# Patient Record
Sex: MALE | Race: WHITE | NOT HISPANIC OR LATINO | Employment: FULL TIME
[De-identification: names, ages, dates, MRNs, and addresses within clinical notes are randomized per-mention and may not be internally consistent; named-entity substitution may affect disease eponyms.]

---

## 2017-01-25 ENCOUNTER — RECORDS - HEALTHEAST (OUTPATIENT)
Dept: ADMINISTRATIVE | Facility: OTHER | Age: 62
End: 2017-01-25

## 2017-03-21 ENCOUNTER — COMMUNICATION - HEALTHEAST (OUTPATIENT)
Dept: INTERNAL MEDICINE | Facility: CLINIC | Age: 62
End: 2017-03-21

## 2017-03-21 DIAGNOSIS — M10.9 GOUT: ICD-10-CM

## 2017-04-12 ENCOUNTER — COMMUNICATION - HEALTHEAST (OUTPATIENT)
Dept: INTERNAL MEDICINE | Facility: CLINIC | Age: 62
End: 2017-04-12

## 2017-04-12 DIAGNOSIS — I10 UNSPECIFIED ESSENTIAL HYPERTENSION: ICD-10-CM

## 2017-07-05 ENCOUNTER — RECORDS - HEALTHEAST (OUTPATIENT)
Dept: ADMINISTRATIVE | Facility: OTHER | Age: 62
End: 2017-07-05

## 2017-07-09 ENCOUNTER — COMMUNICATION - HEALTHEAST (OUTPATIENT)
Dept: INTERNAL MEDICINE | Facility: CLINIC | Age: 62
End: 2017-07-09

## 2017-07-09 DIAGNOSIS — M10.9 GOUT: ICD-10-CM

## 2017-07-13 ENCOUNTER — RECORDS - HEALTHEAST (OUTPATIENT)
Dept: ADMINISTRATIVE | Facility: OTHER | Age: 62
End: 2017-07-13

## 2017-08-29 ENCOUNTER — RECORDS - HEALTHEAST (OUTPATIENT)
Dept: ADMINISTRATIVE | Facility: OTHER | Age: 62
End: 2017-08-29

## 2017-09-26 ENCOUNTER — COMMUNICATION - HEALTHEAST (OUTPATIENT)
Dept: INTERNAL MEDICINE | Facility: CLINIC | Age: 62
End: 2017-09-26

## 2017-09-26 ENCOUNTER — RECORDS - HEALTHEAST (OUTPATIENT)
Dept: ADMINISTRATIVE | Facility: OTHER | Age: 62
End: 2017-09-26

## 2017-09-26 ENCOUNTER — HOSPITAL ENCOUNTER (OUTPATIENT)
Dept: CT IMAGING | Facility: CLINIC | Age: 62
Discharge: HOME | End: 2017-09-26
Attending: INTERNAL MEDICINE | Admitting: INTERNAL MEDICINE
Payer: COMMERCIAL

## 2017-09-26 DIAGNOSIS — Z13.6 SCREENING FOR HEART DISEASE: ICD-10-CM

## 2017-09-26 LAB — RADIOLOGIST FLAGS: NORMAL

## 2017-09-26 PROCEDURE — 75571 CT HRT W/O DYE W/CA TEST: CPT | Mod: GA

## 2017-09-26 PROCEDURE — 75571 CT HRT W/O DYE W/CA TEST: CPT | Performed by: INTERNAL MEDICINE

## 2017-10-10 ENCOUNTER — COMMUNICATION - HEALTHEAST (OUTPATIENT)
Dept: INTERNAL MEDICINE | Facility: CLINIC | Age: 62
End: 2017-10-10

## 2017-10-10 DIAGNOSIS — M10.9 GOUT: ICD-10-CM

## 2017-10-30 ENCOUNTER — AMBULATORY - HEALTHEAST (OUTPATIENT)
Dept: INTERNAL MEDICINE | Facility: CLINIC | Age: 62
End: 2017-10-30

## 2017-10-30 ENCOUNTER — COMMUNICATION - HEALTHEAST (OUTPATIENT)
Dept: LAB | Facility: CLINIC | Age: 62
End: 2017-10-30

## 2017-10-30 DIAGNOSIS — Z85.820 HISTORY OF MELANOMA: ICD-10-CM

## 2017-10-30 DIAGNOSIS — E78.2 MIXED HYPERLIPIDEMIA: ICD-10-CM

## 2017-10-30 DIAGNOSIS — R35.0 URINARY FREQUENCY: ICD-10-CM

## 2017-10-30 DIAGNOSIS — E79.0 HYPERURICEMIA: ICD-10-CM

## 2017-10-30 DIAGNOSIS — Z12.5 PROSTATE CANCER SCREENING: ICD-10-CM

## 2017-11-03 ENCOUNTER — AMBULATORY - HEALTHEAST (OUTPATIENT)
Dept: LAB | Facility: CLINIC | Age: 62
End: 2017-11-03

## 2017-11-03 DIAGNOSIS — Z85.820 HISTORY OF MELANOMA: ICD-10-CM

## 2017-11-03 DIAGNOSIS — Z12.5 PROSTATE CANCER SCREENING: ICD-10-CM

## 2017-11-03 DIAGNOSIS — E78.2 MIXED HYPERLIPIDEMIA: ICD-10-CM

## 2017-11-03 DIAGNOSIS — R35.0 URINARY FREQUENCY: ICD-10-CM

## 2017-11-03 DIAGNOSIS — E79.0 HYPERURICEMIA: ICD-10-CM

## 2017-11-03 LAB
CHOLEST SERPL-MCNC: 208 MG/DL
FASTING STATUS PATIENT QL REPORTED: YES
HDLC SERPL-MCNC: 39 MG/DL
LDLC SERPL CALC-MCNC: 116 MG/DL
PSA SERPL-MCNC: 3.9 NG/ML (ref 0–4.5)
TRIGL SERPL-MCNC: 264 MG/DL

## 2017-11-10 ENCOUNTER — OFFICE VISIT - HEALTHEAST (OUTPATIENT)
Dept: INTERNAL MEDICINE | Facility: CLINIC | Age: 62
End: 2017-11-10

## 2017-11-10 DIAGNOSIS — N52.9 ERECTILE DYSFUNCTION: ICD-10-CM

## 2017-11-10 DIAGNOSIS — E78.2 HYPERLIPEMIA, MIXED: ICD-10-CM

## 2017-11-10 DIAGNOSIS — I25.10 CAD IN NATIVE ARTERY: ICD-10-CM

## 2017-11-10 DIAGNOSIS — E66.01 SEVERE OBESITY WITH BODY MASS INDEX (BMI) OF 35.0 TO 35.9 AND COMORBIDITY (H): ICD-10-CM

## 2017-11-10 DIAGNOSIS — I10 ESSENTIAL HYPERTENSION: ICD-10-CM

## 2017-11-10 ASSESSMENT — MIFFLIN-ST. JEOR: SCORE: 1917.09

## 2017-11-13 ENCOUNTER — COMMUNICATION - HEALTHEAST (OUTPATIENT)
Dept: INTERNAL MEDICINE | Facility: CLINIC | Age: 62
End: 2017-11-13

## 2017-11-15 ENCOUNTER — RECORDS - HEALTHEAST (OUTPATIENT)
Dept: ADMINISTRATIVE | Facility: OTHER | Age: 62
End: 2017-11-15

## 2017-11-16 ENCOUNTER — COMMUNICATION - HEALTHEAST (OUTPATIENT)
Dept: INTERNAL MEDICINE | Facility: CLINIC | Age: 62
End: 2017-11-16

## 2017-11-16 ENCOUNTER — AMBULATORY - HEALTHEAST (OUTPATIENT)
Dept: INTERNAL MEDICINE | Facility: CLINIC | Age: 62
End: 2017-11-16

## 2017-11-16 ENCOUNTER — HOSPITAL ENCOUNTER (OUTPATIENT)
Dept: NUCLEAR MEDICINE | Facility: HOSPITAL | Age: 62
Discharge: HOME | End: 2017-11-16
Attending: INTERNAL MEDICINE

## 2017-11-16 ENCOUNTER — HOSPITAL ENCOUNTER (OUTPATIENT)
Dept: CARDIOLOGY | Facility: HOSPITAL | Age: 62
Discharge: HOME | End: 2017-11-16
Attending: INTERNAL MEDICINE

## 2017-11-16 DIAGNOSIS — I25.10 CAD IN NATIVE ARTERY: ICD-10-CM

## 2017-11-16 DIAGNOSIS — I50.21 ACUTE SYSTOLIC HEART FAILURE (H): ICD-10-CM

## 2017-11-16 LAB
CV STRESS CURRENT BP HE: NORMAL
CV STRESS CURRENT HR HE: 101
CV STRESS CURRENT HR HE: 101
CV STRESS CURRENT HR HE: 102
CV STRESS CURRENT HR HE: 103
CV STRESS CURRENT HR HE: 103
CV STRESS CURRENT HR HE: 104
CV STRESS CURRENT HR HE: 111
CV STRESS CURRENT HR HE: 111
CV STRESS CURRENT HR HE: 112
CV STRESS CURRENT HR HE: 113
CV STRESS CURRENT HR HE: 117
CV STRESS CURRENT HR HE: 91
CV STRESS CURRENT HR HE: 94
CV STRESS CURRENT HR HE: 95
CV STRESS CURRENT HR HE: 96
CV STRESS DEVIATION TIME HE: NORMAL
CV STRESS ECHO PERCENT HR HE: NORMAL
CV STRESS EXERCISE STAGE HE: NORMAL
CV STRESS FINAL RESTING BP HE: NORMAL
CV STRESS FINAL RESTING HR HE: 94
CV STRESS MAX HR HE: 117
CV STRESS MAX TREADMILL GRADE HE: 0
CV STRESS MAX TREADMILL SPEED HE: 0
CV STRESS PEAK DIA BP HE: NORMAL
CV STRESS PEAK SYS BP HE: NORMAL
CV STRESS PHASE HE: NORMAL
CV STRESS PROTOCOL HE: NORMAL
CV STRESS RESTING PT POSITION HE: NORMAL
CV STRESS RESTING PT POSITION HE: NORMAL
CV STRESS ST DEVIATION AMOUNT HE: NORMAL
CV STRESS ST DEVIATION ELEVATION HE: NORMAL
CV STRESS ST EVELATION AMOUNT HE: NORMAL
CV STRESS TEST TYPE HE: NORMAL
CV STRESS TOTAL STAGE TIME MIN 1 HE: NORMAL
NUC STRESS EJECTION FRACTION: 18 %
STRESS ECHO BASELINE BP: NORMAL
STRESS ECHO BASELINE HR: 89
STRESS ECHO CALCULATED PERCENT HR: 74 %
STRESS ECHO LAST STRESS BP: NORMAL
STRESS ECHO LAST STRESS HR: 111

## 2017-11-16 ASSESSMENT — MIFFLIN-ST. JEOR: SCORE: 1914.37

## 2017-11-21 ENCOUNTER — RECORDS - HEALTHEAST (OUTPATIENT)
Dept: ADMINISTRATIVE | Facility: OTHER | Age: 62
End: 2017-11-21

## 2017-11-22 ENCOUNTER — RECORDS - HEALTHEAST (OUTPATIENT)
Dept: ADMINISTRATIVE | Facility: OTHER | Age: 62
End: 2017-11-22

## 2017-12-11 ENCOUNTER — RECORDS - HEALTHEAST (OUTPATIENT)
Dept: ADMINISTRATIVE | Facility: OTHER | Age: 62
End: 2017-12-11

## 2017-12-20 ENCOUNTER — COMMUNICATION - HEALTHEAST (OUTPATIENT)
Dept: INTERNAL MEDICINE | Facility: CLINIC | Age: 62
End: 2017-12-20

## 2018-01-04 ENCOUNTER — AMBULATORY - HEALTHEAST (OUTPATIENT)
Dept: LAB | Facility: CLINIC | Age: 63
End: 2018-01-04

## 2018-01-04 ENCOUNTER — COMMUNICATION - HEALTHEAST (OUTPATIENT)
Dept: INTERNAL MEDICINE | Facility: CLINIC | Age: 63
End: 2018-01-04

## 2018-01-04 DIAGNOSIS — Z79.899 MEDICATION MANAGEMENT: ICD-10-CM

## 2018-01-04 LAB
ANION GAP SERPL CALCULATED.3IONS-SCNC: 6 MMOL/L (ref 5–18)
BUN SERPL-MCNC: 14 MG/DL (ref 8–22)
CALCIUM SERPL-MCNC: 9.4 MG/DL (ref 8.5–10.5)
CHLORIDE BLD-SCNC: 105 MMOL/L (ref 98–107)
CO2 SERPL-SCNC: 28 MMOL/L (ref 22–31)
CREAT SERPL-MCNC: 0.84 MG/DL (ref 0.7–1.3)
GFR SERPL CREATININE-BSD FRML MDRD: >60 ML/MIN/1.73M2
GLUCOSE BLD-MCNC: 110 MG/DL (ref 70–125)
POTASSIUM BLD-SCNC: 5.5 MMOL/L (ref 3.5–5)
SODIUM SERPL-SCNC: 139 MMOL/L (ref 136–145)

## 2018-01-05 ENCOUNTER — COMMUNICATION - HEALTHEAST (OUTPATIENT)
Dept: INTERNAL MEDICINE | Facility: CLINIC | Age: 63
End: 2018-01-05

## 2018-01-05 RX ORDER — LISINOPRIL 20 MG/1
20 TABLET ORAL 2 TIMES DAILY
Status: SHIPPED | COMMUNITY
Start: 2018-01-05 | End: 2022-09-12

## 2018-01-05 RX ORDER — METOPROLOL TARTRATE 25 MG/1
25 TABLET, FILM COATED ORAL 2 TIMES DAILY
Status: SHIPPED | COMMUNITY
Start: 2018-01-05 | End: 2022-09-12

## 2018-01-09 ENCOUNTER — RECORDS - HEALTHEAST (OUTPATIENT)
Dept: ADMINISTRATIVE | Facility: OTHER | Age: 63
End: 2018-01-09

## 2018-01-21 ENCOUNTER — COMMUNICATION - HEALTHEAST (OUTPATIENT)
Dept: INTERNAL MEDICINE | Facility: CLINIC | Age: 63
End: 2018-01-21

## 2018-01-21 DIAGNOSIS — M10.9 GOUT: ICD-10-CM

## 2018-02-05 ENCOUNTER — AMBULATORY - HEALTHEAST (OUTPATIENT)
Dept: LAB | Facility: CLINIC | Age: 63
End: 2018-02-05

## 2018-02-05 DIAGNOSIS — M79.10 MYALGIA: ICD-10-CM

## 2018-02-05 DIAGNOSIS — I42.9 CARDIOMYOPATHY, UNSPECIFIED TYPE (H): ICD-10-CM

## 2018-02-05 DIAGNOSIS — I44.7 LBBB (LEFT BUNDLE BRANCH BLOCK): ICD-10-CM

## 2018-02-05 DIAGNOSIS — Z79.899 MEDICATION MANAGEMENT: ICD-10-CM

## 2018-02-05 LAB
25(OH)D3 SERPL-MCNC: 24.5 NG/ML (ref 30–80)
ANION GAP SERPL CALCULATED.3IONS-SCNC: 10 MMOL/L (ref 5–18)
BUN SERPL-MCNC: 16 MG/DL (ref 8–22)
CALCIUM SERPL-MCNC: 9.5 MG/DL (ref 8.5–10.5)
CHLORIDE BLD-SCNC: 104 MMOL/L (ref 98–107)
CO2 SERPL-SCNC: 25 MMOL/L (ref 22–31)
CREAT SERPL-MCNC: 1 MG/DL (ref 0.7–1.3)
GFR SERPL CREATININE-BSD FRML MDRD: >60 ML/MIN/1.73M2
GLUCOSE BLD-MCNC: 114 MG/DL (ref 70–125)
POTASSIUM BLD-SCNC: 5.5 MMOL/L (ref 3.5–5)
SODIUM SERPL-SCNC: 139 MMOL/L (ref 136–145)

## 2018-02-12 ENCOUNTER — COMMUNICATION - HEALTHEAST (OUTPATIENT)
Dept: INTERNAL MEDICINE | Facility: CLINIC | Age: 63
End: 2018-02-12

## 2018-02-14 ENCOUNTER — RECORDS - HEALTHEAST (OUTPATIENT)
Dept: ADMINISTRATIVE | Facility: OTHER | Age: 63
End: 2018-02-14

## 2018-04-10 ENCOUNTER — RECORDS - HEALTHEAST (OUTPATIENT)
Dept: ADMINISTRATIVE | Facility: OTHER | Age: 63
End: 2018-04-10

## 2018-04-25 ENCOUNTER — COMMUNICATION - HEALTHEAST (OUTPATIENT)
Dept: INTERNAL MEDICINE | Facility: CLINIC | Age: 63
End: 2018-04-25

## 2018-04-25 DIAGNOSIS — M10.9 GOUT: ICD-10-CM

## 2018-05-22 ENCOUNTER — RECORDS - HEALTHEAST (OUTPATIENT)
Dept: ADMINISTRATIVE | Facility: OTHER | Age: 63
End: 2018-05-22

## 2018-06-08 ENCOUNTER — RECORDS - HEALTHEAST (OUTPATIENT)
Dept: ADMINISTRATIVE | Facility: OTHER | Age: 63
End: 2018-06-08

## 2018-06-11 ENCOUNTER — COMMUNICATION - HEALTHEAST (OUTPATIENT)
Dept: INTERNAL MEDICINE | Facility: CLINIC | Age: 63
End: 2018-06-11

## 2018-06-26 ENCOUNTER — RECORDS - HEALTHEAST (OUTPATIENT)
Dept: ADMINISTRATIVE | Facility: OTHER | Age: 63
End: 2018-06-26
Payer: COMMERCIAL

## 2018-06-27 ENCOUNTER — RECORDS - HEALTHEAST (OUTPATIENT)
Dept: ADMINISTRATIVE | Facility: OTHER | Age: 63
End: 2018-06-27

## 2018-07-03 ENCOUNTER — RECORDS - HEALTHEAST (OUTPATIENT)
Dept: ADMINISTRATIVE | Facility: OTHER | Age: 63
End: 2018-07-03

## 2018-07-10 ENCOUNTER — RECORDS - HEALTHEAST (OUTPATIENT)
Dept: ADMINISTRATIVE | Facility: OTHER | Age: 63
End: 2018-07-10

## 2018-08-09 ENCOUNTER — RECORDS - HEALTHEAST (OUTPATIENT)
Dept: ADMINISTRATIVE | Facility: OTHER | Age: 63
End: 2018-08-09

## 2018-11-05 ENCOUNTER — COMMUNICATION - HEALTHEAST (OUTPATIENT)
Dept: LAB | Facility: CLINIC | Age: 63
End: 2018-11-05

## 2018-11-05 ENCOUNTER — AMBULATORY - HEALTHEAST (OUTPATIENT)
Dept: INTERNAL MEDICINE | Facility: CLINIC | Age: 63
End: 2018-11-05

## 2018-11-05 DIAGNOSIS — Z12.5 SCREENING FOR PROSTATE CANCER: ICD-10-CM

## 2018-11-05 DIAGNOSIS — E78.2 MIXED HYPERLIPIDEMIA: ICD-10-CM

## 2018-11-05 DIAGNOSIS — E79.0 HYPERURICEMIA: ICD-10-CM

## 2018-11-05 DIAGNOSIS — Z51.81 MEDICATION MONITORING ENCOUNTER: ICD-10-CM

## 2018-11-05 DIAGNOSIS — R35.0 URINARY FREQUENCY: ICD-10-CM

## 2018-11-08 ENCOUNTER — COMMUNICATION - HEALTHEAST (OUTPATIENT)
Dept: INTERNAL MEDICINE | Facility: CLINIC | Age: 63
End: 2018-11-08

## 2018-11-08 DIAGNOSIS — M10.9 GOUT: ICD-10-CM

## 2018-11-26 ENCOUNTER — AMBULATORY - HEALTHEAST (OUTPATIENT)
Dept: LAB | Facility: CLINIC | Age: 63
End: 2018-11-26

## 2018-11-26 DIAGNOSIS — E79.0 HYPERURICEMIA: ICD-10-CM

## 2018-11-26 DIAGNOSIS — Z12.5 SCREENING FOR PROSTATE CANCER: ICD-10-CM

## 2018-11-26 DIAGNOSIS — R35.0 URINARY FREQUENCY: ICD-10-CM

## 2018-11-26 DIAGNOSIS — Z51.81 MEDICATION MONITORING ENCOUNTER: ICD-10-CM

## 2018-11-26 DIAGNOSIS — E78.2 MIXED HYPERLIPIDEMIA: ICD-10-CM

## 2018-11-26 LAB
ALBUMIN SERPL-MCNC: 4 G/DL (ref 3.5–5)
ALBUMIN UR-MCNC: NEGATIVE MG/DL
ALP SERPL-CCNC: 53 U/L (ref 45–120)
ALT SERPL W P-5'-P-CCNC: 25 U/L (ref 0–45)
ANION GAP SERPL CALCULATED.3IONS-SCNC: 11 MMOL/L (ref 5–18)
APPEARANCE UR: CLEAR
AST SERPL W P-5'-P-CCNC: 23 U/L (ref 0–40)
BILIRUB SERPL-MCNC: 0.8 MG/DL (ref 0–1)
BILIRUB UR QL STRIP: NEGATIVE
BUN SERPL-MCNC: 13 MG/DL (ref 8–22)
CALCIUM SERPL-MCNC: 9.9 MG/DL (ref 8.5–10.5)
CHLORIDE BLD-SCNC: 103 MMOL/L (ref 98–107)
CHOLEST SERPL-MCNC: 185 MG/DL
CO2 SERPL-SCNC: 26 MMOL/L (ref 22–31)
COLOR UR AUTO: YELLOW
CREAT SERPL-MCNC: 0.94 MG/DL (ref 0.7–1.3)
ERYTHROCYTE [DISTWIDTH] IN BLOOD BY AUTOMATED COUNT: 18.1 % (ref 11–14.5)
FASTING STATUS PATIENT QL REPORTED: YES
GFR SERPL CREATININE-BSD FRML MDRD: >60 ML/MIN/1.73M2
GLUCOSE BLD-MCNC: 110 MG/DL (ref 70–125)
GLUCOSE UR STRIP-MCNC: NEGATIVE MG/DL
HCT VFR BLD AUTO: 40.9 % (ref 40–54)
HDLC SERPL-MCNC: 40 MG/DL
HGB BLD-MCNC: 12.2 G/DL (ref 14–18)
HGB UR QL STRIP: NEGATIVE
KETONES UR STRIP-MCNC: NEGATIVE MG/DL
LDLC SERPL CALC-MCNC: 98 MG/DL
LEUKOCYTE ESTERASE UR QL STRIP: NEGATIVE
MCH RBC QN AUTO: 19.4 PG (ref 27–34)
MCHC RBC AUTO-ENTMCNC: 29.8 G/DL (ref 32–36)
MCV RBC AUTO: 65 FL (ref 80–100)
NITRATE UR QL: NEGATIVE
PH UR STRIP: 5.5 [PH] (ref 5–8)
PLATELET # BLD AUTO: 161 THOU/UL (ref 140–440)
POTASSIUM BLD-SCNC: 5.2 MMOL/L (ref 3.5–5)
PROT SERPL-MCNC: 6.6 G/DL (ref 6–8)
PSA SERPL-MCNC: 3.2 NG/ML (ref 0–4.5)
RBC # BLD AUTO: 6.29 MILL/UL (ref 4.4–6.2)
SODIUM SERPL-SCNC: 140 MMOL/L (ref 136–145)
SP GR UR STRIP: 1.02 (ref 1–1.03)
TRIGL SERPL-MCNC: 237 MG/DL
URATE SERPL-MCNC: 6.9 MG/DL (ref 3–8)
UROBILINOGEN UR STRIP-ACNC: NORMAL
WBC: 6.5 THOU/UL (ref 4–11)

## 2018-11-28 ENCOUNTER — COMMUNICATION - HEALTHEAST (OUTPATIENT)
Dept: INTERNAL MEDICINE | Facility: CLINIC | Age: 63
End: 2018-11-28

## 2018-12-11 ENCOUNTER — OFFICE VISIT - HEALTHEAST (OUTPATIENT)
Dept: INTERNAL MEDICINE | Facility: CLINIC | Age: 63
End: 2018-12-11

## 2018-12-11 DIAGNOSIS — E78.2 MIXED HYPERLIPIDEMIA: ICD-10-CM

## 2018-12-11 DIAGNOSIS — Z00.00 ROUTINE GENERAL MEDICAL EXAMINATION AT A HEALTH CARE FACILITY: ICD-10-CM

## 2018-12-11 DIAGNOSIS — C43.72 MALIGNANT MELANOMA OF LEFT LOWER EXTREMITY INCLUDING HIP (H): ICD-10-CM

## 2018-12-11 DIAGNOSIS — M10.9 GOUTY ARTHROPATHY: ICD-10-CM

## 2018-12-11 DIAGNOSIS — I10 ESSENTIAL HYPERTENSION: ICD-10-CM

## 2018-12-11 DIAGNOSIS — D56.8 OTHER THALASSEMIA (H): ICD-10-CM

## 2018-12-11 DIAGNOSIS — Z86.0100 HISTORY OF COLONIC POLYPS: ICD-10-CM

## 2018-12-11 DIAGNOSIS — E66.01 SEVERE OBESITY WITH BODY MASS INDEX (BMI) OF 35.0 TO 35.9 AND COMORBIDITY (H): ICD-10-CM

## 2018-12-11 DIAGNOSIS — N52.9 ERECTILE DYSFUNCTION, UNSPECIFIED ERECTILE DYSFUNCTION TYPE: ICD-10-CM

## 2018-12-11 ASSESSMENT — MIFFLIN-ST. JEOR: SCORE: 1873.55

## 2018-12-27 ENCOUNTER — RECORDS - HEALTHEAST (OUTPATIENT)
Dept: ADMINISTRATIVE | Facility: OTHER | Age: 63
End: 2018-12-27

## 2019-01-02 ENCOUNTER — RECORDS - HEALTHEAST (OUTPATIENT)
Dept: ADMINISTRATIVE | Facility: OTHER | Age: 64
End: 2019-01-02

## 2019-01-09 ENCOUNTER — RECORDS - HEALTHEAST (OUTPATIENT)
Dept: ADMINISTRATIVE | Facility: OTHER | Age: 64
End: 2019-01-09

## 2019-02-04 ENCOUNTER — COMMUNICATION - HEALTHEAST (OUTPATIENT)
Dept: INTERNAL MEDICINE | Facility: CLINIC | Age: 64
End: 2019-02-04

## 2019-02-04 DIAGNOSIS — M10.9 GOUT: ICD-10-CM

## 2019-02-26 ENCOUNTER — SURGERY - HEALTHEAST (OUTPATIENT)
Dept: GASTROENTEROLOGY | Facility: HOSPITAL | Age: 64
End: 2019-02-26

## 2019-02-26 ASSESSMENT — MIFFLIN-ST. JEOR: SCORE: 1907.56

## 2019-05-02 ENCOUNTER — RECORDS - HEALTHEAST (OUTPATIENT)
Dept: ADMINISTRATIVE | Facility: OTHER | Age: 64
End: 2019-05-02

## 2019-05-06 ENCOUNTER — RECORDS - HEALTHEAST (OUTPATIENT)
Dept: ADMINISTRATIVE | Facility: OTHER | Age: 64
End: 2019-05-06

## 2019-05-16 ENCOUNTER — HOSPITAL ENCOUNTER (OUTPATIENT)
Dept: CT IMAGING | Facility: HOSPITAL | Age: 64
Discharge: HOME | End: 2019-05-16
Attending: ORTHOPAEDIC SURGERY

## 2019-05-16 DIAGNOSIS — Z00.00 HEALTHCARE MAINTENANCE: ICD-10-CM

## 2019-07-09 ENCOUNTER — RECORDS - HEALTHEAST (OUTPATIENT)
Dept: ADMINISTRATIVE | Facility: OTHER | Age: 64
End: 2019-07-09

## 2019-08-01 ENCOUNTER — RECORDS - HEALTHEAST (OUTPATIENT)
Dept: ADMINISTRATIVE | Facility: OTHER | Age: 64
End: 2019-08-01

## 2019-08-20 ENCOUNTER — RECORDS - HEALTHEAST (OUTPATIENT)
Dept: ADMINISTRATIVE | Facility: OTHER | Age: 64
End: 2019-08-20

## 2020-01-20 ENCOUNTER — COMMUNICATION - HEALTHEAST (OUTPATIENT)
Dept: LAB | Facility: CLINIC | Age: 65
End: 2020-01-20

## 2020-01-21 ENCOUNTER — AMBULATORY - HEALTHEAST (OUTPATIENT)
Dept: INTERNAL MEDICINE | Facility: CLINIC | Age: 65
End: 2020-01-21

## 2020-01-21 DIAGNOSIS — Z12.5 PROSTATE CANCER SCREENING: ICD-10-CM

## 2020-01-21 DIAGNOSIS — Z51.81 ENCOUNTER FOR THERAPEUTIC DRUG MONITORING: ICD-10-CM

## 2020-01-21 DIAGNOSIS — Z00.00 ROUTINE GENERAL MEDICAL EXAMINATION AT A HEALTH CARE FACILITY: ICD-10-CM

## 2020-01-21 DIAGNOSIS — E79.0 HYPERURICEMIA: ICD-10-CM

## 2020-01-21 DIAGNOSIS — D56.0 ALPHA-THALASSEMIA (H): ICD-10-CM

## 2020-01-21 DIAGNOSIS — E78.2 MIXED HYPERLIPIDEMIA: ICD-10-CM

## 2020-02-11 ENCOUNTER — RECORDS - HEALTHEAST (OUTPATIENT)
Dept: ADMINISTRATIVE | Facility: OTHER | Age: 65
End: 2020-02-11

## 2020-02-12 ENCOUNTER — COMMUNICATION - HEALTHEAST (OUTPATIENT)
Dept: INTERNAL MEDICINE | Facility: CLINIC | Age: 65
End: 2020-02-12

## 2020-02-12 DIAGNOSIS — M10.9 GOUT: ICD-10-CM

## 2020-02-14 ENCOUNTER — AMBULATORY - HEALTHEAST (OUTPATIENT)
Dept: LAB | Facility: CLINIC | Age: 65
End: 2020-02-14

## 2020-02-14 DIAGNOSIS — E78.2 MIXED HYPERLIPIDEMIA: ICD-10-CM

## 2020-02-14 DIAGNOSIS — E79.0 HYPERURICEMIA: ICD-10-CM

## 2020-02-14 DIAGNOSIS — Z00.00 ROUTINE GENERAL MEDICAL EXAMINATION AT A HEALTH CARE FACILITY: ICD-10-CM

## 2020-02-14 DIAGNOSIS — D56.0 ALPHA-THALASSEMIA (H): ICD-10-CM

## 2020-02-14 DIAGNOSIS — Z51.81 ENCOUNTER FOR THERAPEUTIC DRUG MONITORING: ICD-10-CM

## 2020-02-14 DIAGNOSIS — Z12.5 PROSTATE CANCER SCREENING: ICD-10-CM

## 2020-02-14 LAB
ALBUMIN SERPL-MCNC: 4 G/DL (ref 3.5–5)
ALBUMIN UR-MCNC: NEGATIVE MG/DL
ALP SERPL-CCNC: 61 U/L (ref 45–120)
ALT SERPL W P-5'-P-CCNC: 32 U/L (ref 0–45)
ANION GAP SERPL CALCULATED.3IONS-SCNC: 7 MMOL/L (ref 5–18)
APPEARANCE UR: CLEAR
AST SERPL W P-5'-P-CCNC: 24 U/L (ref 0–40)
BILIRUB SERPL-MCNC: 0.7 MG/DL (ref 0–1)
BILIRUB UR QL STRIP: NEGATIVE
BUN SERPL-MCNC: 15 MG/DL (ref 8–22)
CALCIUM SERPL-MCNC: 9.6 MG/DL (ref 8.5–10.5)
CHLORIDE BLD-SCNC: 103 MMOL/L (ref 98–107)
CHOLEST SERPL-MCNC: 208 MG/DL
CO2 SERPL-SCNC: 28 MMOL/L (ref 22–31)
COLOR UR AUTO: YELLOW
CREAT SERPL-MCNC: 0.91 MG/DL (ref 0.7–1.3)
ERYTHROCYTE [DISTWIDTH] IN BLOOD BY AUTOMATED COUNT: 18.4 % (ref 11–14.5)
FASTING STATUS PATIENT QL REPORTED: YES
GFR SERPL CREATININE-BSD FRML MDRD: >60 ML/MIN/1.73M2
GLUCOSE BLD-MCNC: 104 MG/DL (ref 70–125)
GLUCOSE UR STRIP-MCNC: NEGATIVE MG/DL
HCT VFR BLD AUTO: 41.1 % (ref 40–54)
HDLC SERPL-MCNC: 34 MG/DL
HGB BLD-MCNC: 12.3 G/DL (ref 14–18)
HGB UR QL STRIP: NEGATIVE
KETONES UR STRIP-MCNC: NEGATIVE MG/DL
LDLC SERPL CALC-MCNC: 101 MG/DL
LEUKOCYTE ESTERASE UR QL STRIP: NEGATIVE
MCH RBC QN AUTO: 19.5 PG (ref 27–34)
MCHC RBC AUTO-ENTMCNC: 29.9 G/DL (ref 32–36)
MCV RBC AUTO: 65 FL (ref 80–100)
NITRATE UR QL: NEGATIVE
PH UR STRIP: 5.5 [PH] (ref 5–8)
PLATELET # BLD AUTO: 183 THOU/UL (ref 140–440)
POTASSIUM BLD-SCNC: 5.2 MMOL/L (ref 3.5–5)
PROT SERPL-MCNC: 6.5 G/DL (ref 6–8)
PSA SERPL-MCNC: 3.8 NG/ML (ref 0–4.5)
RBC # BLD AUTO: 6.3 MILL/UL (ref 4.4–6.2)
SODIUM SERPL-SCNC: 138 MMOL/L (ref 136–145)
SP GR UR STRIP: 1.02 (ref 1–1.03)
TRIGL SERPL-MCNC: 364 MG/DL
URATE SERPL-MCNC: 6.7 MG/DL (ref 3–8)
UROBILINOGEN UR STRIP-ACNC: NORMAL
WBC: 10.2 THOU/UL (ref 4–11)

## 2020-02-17 ENCOUNTER — COMMUNICATION - HEALTHEAST (OUTPATIENT)
Dept: INTERNAL MEDICINE | Facility: CLINIC | Age: 65
End: 2020-02-17

## 2020-02-21 ENCOUNTER — OFFICE VISIT - HEALTHEAST (OUTPATIENT)
Dept: INTERNAL MEDICINE | Facility: CLINIC | Age: 65
End: 2020-02-21

## 2020-02-21 DIAGNOSIS — I10 ESSENTIAL HYPERTENSION: ICD-10-CM

## 2020-02-21 DIAGNOSIS — D56.8 OTHER THALASSEMIA (H): ICD-10-CM

## 2020-02-21 DIAGNOSIS — Z11.59 NEED FOR HEPATITIS C SCREENING TEST: ICD-10-CM

## 2020-02-21 DIAGNOSIS — C43.72 MALIGNANT MELANOMA OF LEFT LOWER LEG (H): ICD-10-CM

## 2020-02-21 DIAGNOSIS — E78.2 MIXED HYPERLIPIDEMIA: ICD-10-CM

## 2020-02-21 DIAGNOSIS — Z11.4 SCREENING FOR HIV (HUMAN IMMUNODEFICIENCY VIRUS): ICD-10-CM

## 2020-02-21 DIAGNOSIS — N52.9 ERECTILE DYSFUNCTION, UNSPECIFIED ERECTILE DYSFUNCTION TYPE: ICD-10-CM

## 2020-02-21 DIAGNOSIS — Z00.00 HEALTHCARE MAINTENANCE: ICD-10-CM

## 2020-02-21 DIAGNOSIS — M48.062 SPINAL STENOSIS OF LUMBAR REGION WITH NEUROGENIC CLAUDICATION: ICD-10-CM

## 2020-02-21 DIAGNOSIS — D12.6 BENIGN NEOPLASM OF COLON, UNSPECIFIED PART OF COLON: ICD-10-CM

## 2020-02-21 DIAGNOSIS — I42.8 NON-ISCHEMIC CARDIOMYOPATHY (H): ICD-10-CM

## 2020-02-21 DIAGNOSIS — E66.01 SEVERE OBESITY WITH BODY MASS INDEX (BMI) OF 35.0 TO 35.9 AND COMORBIDITY (H): ICD-10-CM

## 2020-02-21 DIAGNOSIS — Z00.00 ROUTINE GENERAL MEDICAL EXAMINATION AT A HEALTH CARE FACILITY: ICD-10-CM

## 2020-02-21 DIAGNOSIS — M10.9 GOUTY ARTHROPATHY: ICD-10-CM

## 2020-02-21 ASSESSMENT — MIFFLIN-ST. JEOR: SCORE: 1914.37

## 2020-02-25 ENCOUNTER — RECORDS - HEALTHEAST (OUTPATIENT)
Dept: ADMINISTRATIVE | Facility: OTHER | Age: 65
End: 2020-02-25

## 2020-03-10 ENCOUNTER — COMMUNICATION - HEALTHEAST (OUTPATIENT)
Dept: INTERNAL MEDICINE | Facility: CLINIC | Age: 65
End: 2020-03-10

## 2020-03-10 DIAGNOSIS — K11.20 SIALADENITIS: ICD-10-CM

## 2020-03-31 ENCOUNTER — OFFICE VISIT - HEALTHEAST (OUTPATIENT)
Dept: INTERNAL MEDICINE | Facility: CLINIC | Age: 65
End: 2020-03-31

## 2020-03-31 DIAGNOSIS — M70.20 OLECRANON BURSITIS, UNSPECIFIED LATERALITY: ICD-10-CM

## 2020-03-31 DIAGNOSIS — M10.9 GOUT: ICD-10-CM

## 2020-03-31 RX ORDER — FUROSEMIDE 20 MG
20 TABLET ORAL DAILY
Status: SHIPPED | COMMUNITY
Start: 2020-03-17 | End: 2024-04-19

## 2020-08-10 ENCOUNTER — RECORDS - HEALTHEAST (OUTPATIENT)
Dept: ADMINISTRATIVE | Facility: OTHER | Age: 65
End: 2020-08-10

## 2020-08-13 ENCOUNTER — COMMUNICATION - HEALTHEAST (OUTPATIENT)
Dept: INTERNAL MEDICINE | Facility: CLINIC | Age: 65
End: 2020-08-13

## 2020-08-21 ENCOUNTER — RECORDS - HEALTHEAST (OUTPATIENT)
Dept: ADMINISTRATIVE | Facility: OTHER | Age: 65
End: 2020-08-21

## 2020-09-16 ENCOUNTER — RECORDS - HEALTHEAST (OUTPATIENT)
Dept: ADMINISTRATIVE | Facility: OTHER | Age: 65
End: 2020-09-16

## 2021-02-24 ENCOUNTER — COMMUNICATION - HEALTHEAST (OUTPATIENT)
Dept: SCHEDULING | Facility: CLINIC | Age: 66
End: 2021-02-24

## 2021-02-26 ENCOUNTER — OFFICE VISIT - HEALTHEAST (OUTPATIENT)
Dept: INTERNAL MEDICINE | Facility: CLINIC | Age: 66
End: 2021-02-26

## 2021-02-26 DIAGNOSIS — H81.10 BENIGN PAROXYSMAL POSITIONAL VERTIGO, UNSPECIFIED LATERALITY: ICD-10-CM

## 2021-02-26 ASSESSMENT — MIFFLIN-ST. JEOR: SCORE: 1946.58

## 2021-03-08 ENCOUNTER — OFFICE VISIT - HEALTHEAST (OUTPATIENT)
Dept: OCCUPATIONAL THERAPY | Facility: REHABILITATION | Age: 66
End: 2021-03-08

## 2021-03-08 DIAGNOSIS — R26.81 UNSTEADINESS ON FEET: ICD-10-CM

## 2021-03-08 DIAGNOSIS — Z78.9 DECREASED ACTIVITIES OF DAILY LIVING (ADL): ICD-10-CM

## 2021-03-08 DIAGNOSIS — R42 DIZZINESS: ICD-10-CM

## 2021-03-10 ENCOUNTER — COMMUNICATION - HEALTHEAST (OUTPATIENT)
Dept: INTERNAL MEDICINE | Facility: CLINIC | Age: 66
End: 2021-03-10

## 2021-03-10 DIAGNOSIS — N52.9 ERECTILE DYSFUNCTION, UNSPECIFIED ERECTILE DYSFUNCTION TYPE: ICD-10-CM

## 2021-03-10 RX ORDER — SILDENAFIL CITRATE 20 MG/1
TABLET ORAL
Qty: 30 TABLET | Refills: 5 | Status: SHIPPED | OUTPATIENT
Start: 2021-03-10 | End: 2022-06-28

## 2021-04-02 ENCOUNTER — AMBULATORY - HEALTHEAST (OUTPATIENT)
Dept: NURSING | Facility: CLINIC | Age: 66
End: 2021-04-02

## 2021-04-23 ENCOUNTER — AMBULATORY - HEALTHEAST (OUTPATIENT)
Dept: NURSING | Facility: CLINIC | Age: 66
End: 2021-04-23

## 2021-04-27 ENCOUNTER — COMMUNICATION - HEALTHEAST (OUTPATIENT)
Dept: LAB | Facility: CLINIC | Age: 66
End: 2021-04-27

## 2021-04-27 DIAGNOSIS — Z12.5 SCREENING FOR PROSTATE CANCER: ICD-10-CM

## 2021-04-27 DIAGNOSIS — M1A.00X0 IDIOPATHIC CHRONIC GOUT WITHOUT TOPHUS, UNSPECIFIED SITE: ICD-10-CM

## 2021-04-27 DIAGNOSIS — Z00.00 ROUTINE HEALTH MAINTENANCE: ICD-10-CM

## 2021-04-27 DIAGNOSIS — E78.2 MIXED HYPERLIPIDEMIA: ICD-10-CM

## 2021-04-27 DIAGNOSIS — I10 ESSENTIAL HYPERTENSION, BENIGN: ICD-10-CM

## 2021-04-27 DIAGNOSIS — D56.0 ALPHA-THALASSEMIA (H): ICD-10-CM

## 2021-05-13 ENCOUNTER — RECORDS - HEALTHEAST (OUTPATIENT)
Dept: INTERNAL MEDICINE | Facility: CLINIC | Age: 66
End: 2021-05-13

## 2021-05-18 ENCOUNTER — AMBULATORY - HEALTHEAST (OUTPATIENT)
Dept: LAB | Facility: CLINIC | Age: 66
End: 2021-05-18

## 2021-05-18 DIAGNOSIS — Z00.00 ROUTINE HEALTH MAINTENANCE: ICD-10-CM

## 2021-05-18 DIAGNOSIS — M1A.00X0 IDIOPATHIC CHRONIC GOUT WITHOUT TOPHUS, UNSPECIFIED SITE: ICD-10-CM

## 2021-05-18 DIAGNOSIS — D56.0 ALPHA-THALASSEMIA (H): ICD-10-CM

## 2021-05-18 DIAGNOSIS — E78.2 MIXED HYPERLIPIDEMIA: ICD-10-CM

## 2021-05-18 DIAGNOSIS — I10 ESSENTIAL HYPERTENSION, BENIGN: ICD-10-CM

## 2021-05-18 DIAGNOSIS — Z12.5 SCREENING FOR PROSTATE CANCER: ICD-10-CM

## 2021-05-18 LAB
ALBUMIN SERPL-MCNC: 3.7 G/DL (ref 3.5–5)
ALBUMIN UR-MCNC: NEGATIVE G/DL
ALP SERPL-CCNC: 64 U/L (ref 45–120)
ALT SERPL W P-5'-P-CCNC: 43 U/L (ref 0–45)
ANION GAP SERPL CALCULATED.3IONS-SCNC: 8 MMOL/L (ref 5–18)
APPEARANCE UR: CLEAR
AST SERPL W P-5'-P-CCNC: 34 U/L (ref 0–40)
BILIRUB SERPL-MCNC: 0.8 MG/DL (ref 0–1)
BILIRUB UR QL STRIP: NEGATIVE
BUN SERPL-MCNC: 26 MG/DL (ref 8–22)
CALCIUM SERPL-MCNC: 9.2 MG/DL (ref 8.5–10.5)
CHLORIDE BLD-SCNC: 100 MMOL/L (ref 98–107)
CHOLEST SERPL-MCNC: 204 MG/DL
CO2 SERPL-SCNC: 28 MMOL/L (ref 22–31)
COLOR UR AUTO: YELLOW
CREAT SERPL-MCNC: 1.37 MG/DL (ref 0.7–1.3)
ERYTHROCYTE [DISTWIDTH] IN BLOOD BY AUTOMATED COUNT: 17.6 % (ref 11–14.5)
FASTING STATUS PATIENT QL REPORTED: YES
GFR SERPL CREATININE-BSD FRML MDRD: 52 ML/MIN/1.73M2
GLUCOSE BLD-MCNC: 120 MG/DL (ref 70–125)
GLUCOSE UR STRIP-MCNC: NEGATIVE MG/DL
HCT VFR BLD AUTO: 39.6 % (ref 40–54)
HDLC SERPL-MCNC: 33 MG/DL
HGB BLD-MCNC: 12.3 G/DL (ref 14–18)
HGB UR QL STRIP: NEGATIVE
KETONES UR STRIP-MCNC: NEGATIVE MG/DL
LDLC SERPL CALC-MCNC: 111 MG/DL
LEUKOCYTE ESTERASE UR QL STRIP: NEGATIVE
MCH RBC QN AUTO: 20 PG (ref 27–34)
MCHC RBC AUTO-ENTMCNC: 31.1 G/DL (ref 32–36)
MCV RBC AUTO: 65 FL (ref 80–100)
NITRATE UR QL: NEGATIVE
PH UR STRIP: 6 [PH] (ref 5–8)
PLATELET # BLD AUTO: 180 THOU/UL (ref 140–440)
POTASSIUM BLD-SCNC: 5.5 MMOL/L (ref 3.5–5)
PROT SERPL-MCNC: 6.9 G/DL (ref 6–8)
PSA SERPL-MCNC: 4.9 NG/ML (ref 0–4.5)
RBC # BLD AUTO: 6.14 MILL/UL (ref 4.4–6.2)
SODIUM SERPL-SCNC: 136 MMOL/L (ref 136–145)
SP GR UR STRIP: 1.02 (ref 1–1.03)
TRIGL SERPL-MCNC: 298 MG/DL
URATE SERPL-MCNC: 9.4 MG/DL (ref 3–8)
UROBILINOGEN UR STRIP-ACNC: NORMAL
WBC: 8.7 THOU/UL (ref 4–11)

## 2021-05-19 ENCOUNTER — COMMUNICATION - HEALTHEAST (OUTPATIENT)
Dept: INTERNAL MEDICINE | Facility: CLINIC | Age: 66
End: 2021-05-19

## 2021-05-19 ENCOUNTER — AMBULATORY - HEALTHEAST (OUTPATIENT)
Dept: INTERNAL MEDICINE | Facility: CLINIC | Age: 66
End: 2021-05-19

## 2021-05-19 DIAGNOSIS — R97.20 ABNORMAL PROSTATE SPECIFIC ANTIGEN (PSA): ICD-10-CM

## 2021-05-19 DIAGNOSIS — E79.0 HYPERURICEMIA: ICD-10-CM

## 2021-05-19 RX ORDER — ALLOPURINOL 300 MG/1
300 TABLET ORAL DAILY
Qty: 90 TABLET | Refills: 3 | Status: SHIPPED | OUTPATIENT
Start: 2021-05-19 | End: 2021-07-23

## 2021-05-24 ENCOUNTER — RECORDS - HEALTHEAST (OUTPATIENT)
Dept: ADMINISTRATIVE | Facility: CLINIC | Age: 66
End: 2021-05-24

## 2021-05-25 ENCOUNTER — OFFICE VISIT - HEALTHEAST (OUTPATIENT)
Dept: INTERNAL MEDICINE | Facility: CLINIC | Age: 66
End: 2021-05-25

## 2021-05-25 DIAGNOSIS — Z00.00 ROUTINE GENERAL MEDICAL EXAMINATION AT A HEALTH CARE FACILITY: ICD-10-CM

## 2021-05-25 DIAGNOSIS — M79.10 MYALGIA: ICD-10-CM

## 2021-05-25 DIAGNOSIS — E66.01 SEVERE OBESITY WITH BODY MASS INDEX (BMI) OF 35.0 TO 35.9 AND COMORBIDITY (H): ICD-10-CM

## 2021-05-25 DIAGNOSIS — R53.82 CHRONIC FATIGUE: ICD-10-CM

## 2021-05-25 DIAGNOSIS — M48.062 SPINAL STENOSIS OF LUMBAR REGION WITH NEUROGENIC CLAUDICATION: ICD-10-CM

## 2021-05-25 DIAGNOSIS — D56.8 OTHER THALASSEMIA (H): ICD-10-CM

## 2021-05-25 DIAGNOSIS — I42.8 NON-ISCHEMIC CARDIOMYOPATHY (H): ICD-10-CM

## 2021-05-25 DIAGNOSIS — Z23 NEED FOR SHINGLES VACCINE: ICD-10-CM

## 2021-05-25 DIAGNOSIS — E78.2 MIXED HYPERLIPIDEMIA: ICD-10-CM

## 2021-05-25 DIAGNOSIS — I10 ESSENTIAL HYPERTENSION: ICD-10-CM

## 2021-05-25 DIAGNOSIS — M10.9 GOUTY ARTHROPATHY: ICD-10-CM

## 2021-05-25 DIAGNOSIS — D12.6 BENIGN NEOPLASM OF COLON, UNSPECIFIED PART OF COLON: ICD-10-CM

## 2021-05-25 DIAGNOSIS — C43.72 MALIGNANT MELANOMA OF LEFT LOWER LEG (H): ICD-10-CM

## 2021-05-25 RX ORDER — ROSUVASTATIN CALCIUM 5 MG/1
5 TABLET, COATED ORAL AT BEDTIME
Qty: 90 TABLET | Refills: 3 | Status: SHIPPED
Start: 2021-05-25 | End: 2022-08-16

## 2021-05-25 ASSESSMENT — MIFFLIN-ST. JEOR: SCORE: 1927.52

## 2021-05-31 ENCOUNTER — RECORDS - HEALTHEAST (OUTPATIENT)
Dept: ADMINISTRATIVE | Facility: CLINIC | Age: 66
End: 2021-05-31

## 2021-05-31 VITALS — BODY MASS INDEX: 37.03 KG/M2 | HEIGHT: 69 IN | WEIGHT: 250 LBS

## 2021-05-31 VITALS — BODY MASS INDEX: 37.12 KG/M2 | WEIGHT: 250.6 LBS | HEIGHT: 69 IN

## 2021-06-01 ENCOUNTER — RECORDS - HEALTHEAST (OUTPATIENT)
Dept: ADMINISTRATIVE | Facility: CLINIC | Age: 66
End: 2021-06-01

## 2021-06-02 ENCOUNTER — RECORDS - HEALTHEAST (OUTPATIENT)
Dept: ADMINISTRATIVE | Facility: CLINIC | Age: 66
End: 2021-06-02

## 2021-06-02 VITALS — WEIGHT: 245 LBS | HEIGHT: 70 IN | BODY MASS INDEX: 35.07 KG/M2

## 2021-06-02 VITALS — HEIGHT: 69 IN | WEIGHT: 241 LBS | BODY MASS INDEX: 35.7 KG/M2

## 2021-06-04 VITALS
BODY MASS INDEX: 37.03 KG/M2 | WEIGHT: 250 LBS | OXYGEN SATURATION: 97 % | HEIGHT: 69 IN | HEART RATE: 78 BPM | SYSTOLIC BLOOD PRESSURE: 114 MMHG | DIASTOLIC BLOOD PRESSURE: 72 MMHG

## 2021-06-05 VITALS
WEIGHT: 257.1 LBS | OXYGEN SATURATION: 98 % | DIASTOLIC BLOOD PRESSURE: 74 MMHG | BODY MASS INDEX: 38.08 KG/M2 | SYSTOLIC BLOOD PRESSURE: 118 MMHG | HEART RATE: 72 BPM | HEIGHT: 69 IN

## 2021-06-06 NOTE — TELEPHONE ENCOUNTER
Symptom  Describe your symptoms: Jaw swelling, soreness back of jaw salivary gland  Any pain: yes  New/Ongoing: New  Noticed this AM  How long have you been having symptoms: 1  day(s)  Have you been seen for this:  No  Appointment offered?: Patient declines  Triage offered?: No, [place] of state  Home remedies tried: Sucking on lemon drops.  Requested Pharmacy: [facility] [address], [place] [address]  Okay to leave a detailed message? Yes [phone]

## 2021-06-06 NOTE — PATIENT INSTRUCTIONS - HE
1..  Td booster today    2.  Next colonoscopy is due 2024    3.  Shingrix is advised.  Check insurance    4.  Same medications    5.  See in one year or as needed

## 2021-06-06 NOTE — TELEPHONE ENCOUNTER
Augmentin 875 mg's twice daily with food for 10 days.  Continue lemon drops and warm compresses.  See if symptoms do not improve.  Please set up Rx to be sent to his [place] pharmacy.

## 2021-06-06 NOTE — TELEPHONE ENCOUNTER
RN cannot approve Refill Request    RN can NOT refill this medication PCP messaged that patient is overdue for Labs and Office Visit. Last office visit: Visit date not found Last Physical: Visit date not found Last MTM visit: Visit date not found Last visit same specialty: 6/1/2016 Alondra Pritchett MD.  Next visit within 3 mo: Visit date not found  Next physical within 3 mo: Visit date not found      Malgorzata KEILA Jon, [facility] Triage/Med Refill 2/16/2020    Requested Prescriptions   Pending Prescriptions Disp Refills     allopurinoL (ZYLOPRIM) 100 MG tablet [Pharmacy Med Name: ALLOPURINOL 100MG TABLETS] 180 tablet 3     Sig: TAKE 1 TABLET BY MOUTH TWICE DAILY       Allopurinol/Febuxostat Refill Protocol  Failed - 2/12/2020  9:10 AM        Failed - LFT or AST or ALT in last 12 months     Albumin   Date Value Ref Range Status   02/14/2020 4.0 3.5 - 5.0 g/dL Final     Bilirubin, Total   Date Value Ref Range Status   02/14/2020 0.7 0.0 - 1.0 mg/dL Final     Alkaline Phosphatase   Date Value Ref Range Status   02/14/2020 61 45 - 120 U/L Final     AST   Date Value Ref Range Status   02/14/2020 24 0 - 40 U/L Final     ALT   Date Value Ref Range Status   02/14/2020 32 0 - 45 U/L Final     Protein, Total   Date Value Ref Range Status   02/14/2020 6.5 6.0 - 8.0 g/dL Final                Failed - Visit with PCP or prescribing provider visit in past 12 months     Last office visit with prescriber/PCP: Visit date not found OR same dept: Visit date not found OR same specialty: 6/1/2016 Alondra Pritchett MD  Last physical: Visit date not found Last MTM visit: Visit date not found   Next visit within 3 mo: Visit date not found  Next physical within 3 mo: Visit date not found  Prescriber OR PCP: Ignacio Chand DO  Last diagnosis associated with med order: 1. Gout  - allopurinoL (ZYLOPRIM) 100 MG tablet [Pharmacy Med Name: ALLOPURINOL 100MG TABLETS]; TAKE 1 TABLET BY MOUTH TWICE DAILY  Dispense: 180 tablet; Refill:  3    If protocol passes may refill for 12 months if within 3 months of last provider visit (or a total of 15 months).

## 2021-06-06 NOTE — PROGRESS NOTES
ASSESSMENT:  1. Routine general medical examination at a health care facility  Jhonny is .  He is still working full-time and hopes to work for years.  He is fairly sedentary.  He travels back and forth [place].  Body fat is above goal at 33.3%.  He will receive a Td booster today.  Demetrio Laura is advised    2. Essential hypertension  He is now taking a total of 75 mg of metoprolol daily.  Blood pressure is acceptable    3. Non-ischemic cardiomyopathy (H)  He does have a defibrillator and biventricular pacer.  Ejection fraction was up to 44% on his most recent echocardiogram    4. Malignant melanoma of left lower leg (H)  There is been no evidence for recurrence.  He has close otology follow-up    5. Severe obesity with body mass index (BMI) of 35.0 to 35.9 and comorbidity (H)  Goal body fat of 25% is encouraged    6. Benign neoplasm of colon, unspecified part of colon  Next colonoscopy is due 2024    7. Thalassemia Anemia  Previsit labs were reviewed.  Hemoglobin is stable at 12.3    8. Mixed hyperlipidemia  He still has moderate elevation in triglycerides.  Weight loss was encouraged    9. Gout  Previsit uric acid was acceptable at 6.7.  He will continue allopurinol.  Has not had recent issues with symptomatic gout.    10. Screening for HIV (human immunodeficiency virus)  Screening is advised per recent health maintenance recommendations    11. Need for hepatitis C screening test  Screening is advised per recent health maintenance recommendations    12. Erectile dysfunction, unspecified erectile dysfunction type  Refill of sildenafil is authorized  - sildenafil (REVATIO) 20 mg tablet; Two to four tabs as needed for erectile dysfunction  Dispense: 30 tablet; Refill: 5    13. Spinal stenosis of lumbar region with neurogenic claudication  His most recent CT of the lumbar spine was reviewed.  Moderate to severe spinal stenosis at L4-5 was noted.  He has noted some improvement with a home exercise program.  He has  not had an injection for this    14. Healthcare maintenance  Health maintenance and screening issues were reviewed        PLAN:  Patient Instructions   1..  Td booster today    2.  Next colonoscopy is due 2024    3.  Shingrix is advised.  Check insurance    4.  Same medications    5.  See in one year or as needed    6.  Previsit labs were reviewed  Orders Placed This Encounter   Procedures     Td, Preservative Free (green label)     Medications Discontinued During This Encounter   Medication Reason     sildenafil, antihypertensive, (REVATIO) 20 mg tablet Reorder       Return in about 1 year (around 2/21/2021) for Annual physical.    CHIEF COMPLAINT:  Chief Complaint   Patient presents with     Annual Exam     Immunizations     Declined flu       HISTORY OF PRESENT ILLNESS:  Cj is a 64 y.o. male presenting to the clinic today for an annual exam.     Exercise:  The patient says he does not have as much stamina as he used to especially with upper body exercises. He does not have problems with walking but mentions that his hamstrings cramp up occasionally. He has an exercise bike at home.     Neuropathy:  The patient does exercises to help with the neuropathy pain that radiates to his right leg. They temporarily help with the pain. He no longer goes to PT, but he says that a traction device they had was helpful for relieving his pain.    Skin Problem:  He had a mole removed on 2/12/2020. The patient says he will need to follow up to get more tissue removed.    Health Maintenance: He was given a tetanus shot today.     REVIEW OF SYSTEMS:  His left ventricle ejection fraction goal is 50%  All other systems are negative.    PFSH:  He went to [place] in December and January. He is planning on going back in a couple weeks.      Social History     Tobacco Use   Smoking Status Former Smoker   Smokeless Tobacco Never Used   Tobacco Comment    35 years ago       Family History   Problem Relation Age of Onset     Kidney  failure Mother      Diabetes type II Mother      Gout Other      Lung cancer Father        Social History     Socioeconomic History     Marital status:      Spouse name: Not on file     Number of children: Not on file     Years of education: Not on file     Highest education level: Not on file   Occupational History     Occupation: Supervisor     Employer: [employer]   Social Needs     Financial resource strain: Not on file     Food insecurity:     Worry: Not on file     Inability: Not on file     Transportation needs:     Medical: Not on file     Non-medical: Not on file   Tobacco Use     Smoking status: Former Smoker     Smokeless tobacco: Never Used     Tobacco comment: 35 years ago   Substance and Sexual Activity     Alcohol use: Yes     Alcohol/week: 12.0 standard drinks     Types: 12 Cans of beer per week     Drug use: Yes     Types: Marijuana     Sexual activity: Not on file   Lifestyle     Physical activity:     Days per week: Not on file     Minutes per session: Not on file     Stress: Not on file   Relationships     Social connections:     Talks on phone: Not on file     Gets together: Not on file     Attends Roman [employer] service: Not on file     Active member of club or organization: Not on file     Attends meetings of clubs or organizations: Not on file     Relationship status: Not on file     Intimate partner violence:     Fear of current or ex partner: Not on file     Emotionally abused: Not on file     Physically abused: Not on file     Forced sexual activity: Not on file   Other Topics Concern     Not on file   Social History Narrative    He works for [employer] as a manager.  He is  and has 3 children and 3 grandchildren.       Past Surgical History:   Procedure Laterality Date     COLONOSCOPY N/A 2/26/2019    Procedure: COLONOSCOPY with polypectomy;  Surgeon: Lara Harman MD;  Location: [facility];  Service: Gastroenterology     INGUINAL HERNIA REPAIR  "      ID REVISE SECONDARY VARICOSITY      Description: Varicose Vein Ligation;  Recorded: 05/19/2009;       Allergies   Allergen Reactions     Simvastatin      Spironolactone Other (See Comments)       Active Ambulatory Problems     Diagnosis Date Noted     Cervical Spine Stenosis From C ___      Mixed hyperlipidemia      Cholelithiasis      Distal Ureteral Stone On The Left      Male Erectile Disorder      Thalassemia Anemia      Gout      Essential hypertension      Benign Tubular Adenoma Of The Large Intestine      Dermatitis Due To Contact With Poison Fanta      Severe obesity with body mass index (BMI) of 35.0 to 35.9 and comorbidity (H) 11/10/2017     Biventricular ICD (implantable cardioverter-defibrillator) in place 06/27/2018     Non-ischemic cardiomyopathy (H) 06/27/2018     Malignant melanoma of left lower leg (H) 02/02/2016     Spinal stenosis of lumbar region with neurogenic claudication 02/21/2020     Resolved Ambulatory Problems     Diagnosis Date Noted     Melanoma (H) 06/01/2016     Past Medical History:   Diagnosis Date     Coronary artery disease      Family history of myocardial infarction      Gout      High cholesterol      Hyperlipidemia      Hypertension      Thalassemia        VITALS:  Vitals:    02/21/20 0858   BP: 114/72   Patient Site: Left Arm   Patient Position: Sitting   Cuff Size: Adult Large   Pulse: 78   SpO2: 97%   Weight: (!) 250 lb (113.4 kg)   Height: 5' 9\" (1.753 m)     Wt Readings from Last 3 Encounters:   02/21/20 (!) 250 lb (113.4 kg)   02/26/19 (!) 245 lb (111.1 kg)   12/11/18 (!) 241 lb (109.3 kg)     Body mass index is 36.92 kg/m .    PHYSICAL EXAM:  Body Fat: 33%  Constitutional:  Reveals an alert, pleasant, stocky man. Affect appropriate. Does not appear acutely ill.  Vitals:  Per nursing notes.  Head: Atraumatic  Eyes: EOMs full  Ears:  Clear.  Oropharynx:  Without posterior nasal drainage or thrush.  Neck:  Supple, thyroid not palpable.  Back: No abnormal kyphosis "   Thorax: Defibrillator left upper chest  Cardiac:  Regular rate and rhythm without murmurs, rubs, or gallops.  Lungs: Clear.  Respiratory effort normal.  Abdomen:  Obese, no bruit,  Bowel sounds positive, nontender, nondistended.  Skin: Numerous freckles, multiple dysplastic nevi.  Extremities: Moderate bunion deformity right foot   : No penile lesions or testicular masses.  Rectal: Prostate moderately enlarged no suspicious nodules  Neurologic: Cranial nerves II-XII intact.     Psychiatric: Mood appropriate, memory intact.    QUALITY MEASURES:  The following high BMI interventions were performed this visit: encouragement to exercise    MEDICATIONS:  Current Outpatient Medications   Medication Sig Dispense Refill     allopurinoL (ZYLOPRIM) 100 MG tablet TAKE 1 TABLET BY MOUTH TWICE DAILY 180 tablet 3     co-enzyme Q-10 30 mg capsule Take 30 mg by mouth daily.       hydrocortisone 1 % ointment Apply topically as needed.       lisinopril (PRINIVIL,ZESTRIL) 20 MG tablet Take 20 mg by mouth daily.       metoprolol tartrate (LOPRESSOR) 25 MG tablet Take 25 mg by mouth 2 (two) times a day.       naproxen sodium (ALEVE) 220 MG tablet Take 220 mg by mouth 2 (two) times a day with meals.        sildenafil (REVATIO) 20 mg tablet Two to four tabs as needed for erectile dysfunction 30 tablet 5     aspirin 81 mg chewable tablet Chew 81 mg daily.       pravastatin (PRAVACHOL) 40 MG tablet Take 40 mg by mouth daily.       No current facility-administered medications for this visit.        ADDITIONAL HISTORY SUMMARIZED (2): Reviewed 2/26/2019 colonoscopy. He was instructed to follow up in 5 years.   DECISION TO OBTAIN EXTRA INFORMATION (1): Care Everywhere accessed.   RADIOLOGY TESTS (1): None.  LABS (1): Labs reviewed.   MEDICINE TESTS (1): Reviewed 8/1/2019 echocardiogram. He had a left ventricular ejection fraction of 44%.  INDEPENDENT REVIEW (2 each): None.     The visit lasted a total of 21 minutes face to face with the  patient. Over 50% of the time was spent counseling and educating the patient about exercise, neuropathy, and skin problems.    I, Parker Ly, am scribing for and in the presence of, Dr. Sorensen.    I, Jasper Sorensen, personally performed the services described in this documentation, as scribed by Parker Ly in my presence, and it is both accurate and complete.    Total data points:4

## 2021-06-07 NOTE — PROGRESS NOTES
ASSESSMENT:  1.  Left Post-traumatic olecranon bursitis    2.  History of gout:  He remains on allopurinol 200 mg daily.  Last uric acid was in the desired range at 6.7.  I do not feel that the elbow swelling is related to gout.    3.  Nonischemic cardiomyopathy:  He currently feels well.  Last ejection fraction was up to 44%    PLAN:  1.  Use an Ace wrap to the elbow for compression.    2.  Cold pack to elbow    3.  Call for feverishness, increased pain or erythema.  Also call if no improvement in 2 weeks      Medications Discontinued During This Encounter   Medication Reason     allopurinoL (ZYLOPRIM) 100 MG tablet        Yearly exam due 2/2022    ASSESSED PROBLEMS:  1. Olecranon bursitis, unspecified laterality     2. Gout  allopurinoL (ZYLOPRIM) 100 MG tablet       CHIEF COMPLAINT:  Chief Complaint   Patient presents with     Elbow Pain     left; injured 3 weeks ago; swollen; warm       HISTORY OF PRESENT ILLNESS:  Cj is a 64 y.o. male with a past history of gout and nonischemic cardiomyopathy.  He calls complaining of persistent pain and swelling off the tip of the left elbow.  This began acutely after trauma 3 weeks ago.  He has not had fevers, chills, drainage, or worsening erythema.  There is no previous history of similar issues.  He has on allopurinol 200 mg daily.  Last uric acid level was 6.7.    REVIEW OF SYSTEMS:  He otherwise feels well.  Exercise tolerance has been good although he is quite sedentary.  Comprehensive review of systems is negative.    PFSH:  .  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  3 trips to [place] over the winter    TOBACCO USE:  Social History     Tobacco Use   Smoking Status Former Smoker   Smokeless Tobacco Never Used   Tobacco Comment    35 years ago       VITALS:  There were no vitals filed for this visit.  Wt Readings from Last 3 Encounters:   02/21/20 (!) 250 lb (113.4 kg)   02/26/19 (!) 245 lb (111.1 kg)   12/11/18 (!) 241 lb (109.3 kg)       PHYSICAL EXAM:  Tele-visit.   Not examined    DATA REVIEWED:  Additional History from Old Records Summarized (2): None.  Decision to Obtain Records (1): None.   Radiology Tests Summarized or Ordered (1): None.  Labs Reviewed or Ordered (1): None.  Medicine Test Summarized or Ordered (1): None.   Independent Review of EKG or X-RAY(2 each): None.    The visit lasted a total of 7 minutes face to face with the patient. Over 50% of the time was spent counseling and educating the patient about management of posttraumatic olecranon bursitis.      Dragon dictation was used for this note. Speech recognition errors are a possibility.     MEDICATIONS:  Current Outpatient Medications   Medication Sig Dispense Refill     allopurinoL (ZYLOPRIM) 100 MG tablet Take 2 tablets (200 mg total) by mouth daily. 180 tablet 3     furosemide (LASIX) 20 MG tablet Take 20 mg by mouth daily.       hydrocortisone 1 % ointment Apply topically as needed.       lisinopril (PRINIVIL,ZESTRIL) 20 MG tablet Take 20 mg by mouth 2 (two) times a day.        metoprolol tartrate (LOPRESSOR) 25 MG tablet Take 25 mg by mouth 2 (two) times a day. Taking 25 mg q am and 50 mg q pm       naproxen sodium (ALEVE) 220 MG tablet Take 220 mg by mouth 2 (two) times a day with meals.        aspirin 81 mg chewable tablet Chew 81 mg daily.       co-enzyme Q-10 30 mg capsule Take 30 mg by mouth daily.       pravastatin (PRAVACHOL) 40 MG tablet Take 40 mg by mouth daily.       sildenafil (REVATIO) 20 mg tablet Two to four tabs as needed for erectile dysfunction 30 tablet 5     No current facility-administered medications for this visit.

## 2021-06-07 NOTE — PATIENT INSTRUCTIONS - HE
1.  Use an Ace wrap to the left elbow for compression.  Use cold packs multiple times daily    2.  Call for fevers, increased pain or erythema.  Also call if not improving in 2 weeks.    3.  General exam next due in February 2022

## 2021-06-14 NOTE — PROGRESS NOTES
ASSESSMENT:  1.  Health maintenance: Body fat is 33%.  He declines influenza vaccine.  He is up-to-date on other immunizations and screening studies.  Goal body fat under 25% was encouraged.    2.  Coronary artery disease: His coronary CT for calcium score was reviewed.  His score places him in the 70% level for age in terms of risk.  He had a disproportionate score in the LAD.  He does note diminished exercise tolerance over the past year.  A stress test seems reasonable for further evaluation.  Careful attention to risk factors was emphasized    3.  Hyperlipidemia: He previously did not tolerate simvastatin due to myalgias.  He was encouraged to try atorvastatin 10 mg daily along with coenzyme Q 10 as an alternate.  Fasting lipid should be rechecked in 3 months.    4.  Indeterminate lung nodule: He had a 2 mm lung nodule noted incidentally by CT scan.  Since he has had 2 previous melanomas, I would advise a one-year CT for follow-up.    5.  Erectile dysfunction: Generic 20 mg sildenafil was offered as a lower price alternative to Cialis    6.  Colonic polyps: Colonoscopy is due next year    7.  Obesity with BMI 37.01:  Goal body fat under 25% was encouraged    8.  Malignant melanoma: He has had 2 separate melanomas excised.  He will continue regular dermatology checks    PLAN:  1.  Previsit labs were reviewed.  2.  Start atorvastatin 10 mg daily.  Try coenzyme Q 10 with this.  3.  Schedule nuclear stress test due to diminished exercise tolerance and known coronary artery disease.  4.  Recheck chest CT in one year due to indeterminate lung nodule and history of melanoma  5.  Colonoscopy due next year  6.  Recheck fasting lipids after 3 months on atorvastatin.  7.  Clinic follow-up 1 year or as needed    No orders of the defined types were placed in this encounter.    Medications Discontinued During This Encounter   Medication Reason     tadalafil (CIALIS) 20 MG tablet Alternate therapy     amLODIPine-benazepril  (LOTREL 5-20) 5-20 mg per capsule Reorder       No Follow-up on file.    ASSESSED PROBLEMS:  1. Hyperlipemia, mixed  atorvastatin (LIPITOR) 10 MG tablet   2. CAD in native artery  atorvastatin (LIPITOR) 10 MG tablet    NM Exercise Stress Test   3. Erectile dysfunction  sildenafil (REVATIO) 20 mg tablet   4. Essential hypertension  amLODIPine-benazepril (LOTREL 5-20) 5-20 mg per capsule       CHIEF COMPLAINT:  Chief Complaint   Patient presents with     Annual Exam       HISTORY OF PRESENT ILLNESS:  Cj is a 62 y.o. male presenting to the clinic today for a physical exam and to follow up on some recent testing.     CAD: [facility] and he was in the 70th percentile for his demographic on that. There was a disproportionately high score in his LAD. He has noticed that he gets short of breath more easily than he used to with exertion. This is especially noticeable with upper body exertion. He tends to ease off whenever that happens to avoid pushing it. He would be interested in having a stress test done.     Health Maintenance: He is due for a colonoscopy next year.     REVIEW OF SYSTEMS:   He has had a lot more joint pain the past year, and he has not been exercising as much as a result. He has started taking two Aleve daily instead of one as of one month ago, which has helped. He took simvastatin and other statins, but he did not tolerate them well. He developed muscle aches. He has not taken atorvastatin and would be willing to try it. He just started taking coenzyme Q10. He has had two melanomas and follows with dermatology regularly. He is using Aldara cream now and applies sunscreen daily. His CT showed a 2 mm nodule in his lung. All other systems are negative.    PFSH:  He has a history of two melanomas.     History   Smoking Status     Never Smoker   Smokeless Tobacco     Not on file       Family History   Problem Relation Age of Onset     Kidney failure  "Mother      Diabetes type II Mother      Gout Other      Lung cancer Father        Social History     Social History     Marital status:      Spouse name: N/A     Number of children: N/A     Years of education: N/A     Occupational History     Supervisor [employer]     Social History Main Topics     Smoking status: Never Smoker     Smokeless tobacco: Not on file     Alcohol use Yes     Drug use: No     Sexual activity: Not on file     Other Topics Concern     Not on file     Social History Narrative    He works for [employer] as a manager.  He is  and has 3 children and 3 grandchildren.       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      Dermatitis Due To Contact With Poison Ivy      Melanoma 06/01/2016     Resolved Ambulatory Problems     Diagnosis Date Noted     No Resolved Ambulatory Problems     Past Medical History:   Diagnosis Date     Gout      Hyperlipidemia      Hypertension      Thalassemia        VITALS:  Vitals:    11/10/17 1419   BP: 120/86   Patient Site: Left Arm   Patient Position: Sitting   Cuff Size: Adult Large   Pulse: 84   Weight: (!) 250 lb 9.6 oz (113.7 kg)   Height: 5' 9\" (1.753 m)     Wt Readings from Last 3 Encounters:   11/10/17 (!) 250 lb 9.6 oz (113.7 kg)   08/25/16 (!) 240 lb 9.6 oz (109.1 kg)   06/01/16 (!) 250 lb (113.4 kg)     Body mass index is 37.01 kg/(m^2).    PHYSICAL EXAM:  Constitutional:   Reveals an alert, pleasant, stocky male. Affect " appropriate. Does not appear acutely ill.  Vitals: per nursing notes.  Body Fat: 33.1%  HEENT:  Ears:  External canals, TMs clear.    Eyes:  EOMs full, PERRL.  Oropharynx:   Mouth and throat clear, no thrush or exudate.  Neck:  Supple, no carotid bruits or adenopathy.  Back:  No spine or CVA pain.  Thorax:  No bony deformities.  Lungs: Clear to A&P without rales or wheezes.  Respiratory effort normal.  Cardiac:   Regular rate and rhythm, normal S1, S2, no murmur or gallop.  Abdomen: Obese, active bowel sounds without bruits, mass, or tenderness.  :  (Men)  No testicular masses, no penile lesions.    Rectal:  Prostate without suspicious masses.   Extremities:  Old scar left shin related to melanoma excision, diminished ROM of right thumb. No peripheral edema, pulses in the feet intact.    Skin: Hematoma on left back, numerous old biopsy scars, numerous pigmented nevi, no lesions to suggest malignancy.  Neuro:  Alert and oriented. Cranial nerves, motor, sensory exams are intact.  No gross focal deficits.  Psychiatric:  Memory intact, mood appropriate.    QUALITY MEASURES:  The following high BMI interventions were performed this visit: encouragement to exercise and weight monitoring    ADDITIONAL HISTORY SUMMARIZED (2): Reviewed 2013 colonoscopy report regarding clearance.   DECISION TO OBTAIN EXTRA INFORMATION (1): None.   RADIOLOGY TESTS (1): Reviewed CT coronary calcium. Ordered NM stress test.   LABS (1): Labs from 11/3/2017 reviewed.   MEDICINE TESTS (1): None.  INDEPENDENT REVIEW (2 each): None.     The visit lasted a total of 25 minutes face to face with the patient. Over 50% of the time was spent counseling and educating the patient about general health maintenance.    Yordy GIORDANO, am scribing for and in the presence of, Dr. Sorensen.    RORO GIORDANO, personally performed the services described in this documentation, as scribed by Yordy Valdes in my presence, and it is both accurate and  complete.    Dragon dictation was used for this note.  Speech recognition errors are a possibility.    MEDICATIONS:  Current Outpatient Prescriptions   Medication Sig Dispense Refill     allopurinol (ZYLOPRIM) 100 MG tablet Take 1 tablet (100 mg total) by mouth 2 (two) times a day. 180 tablet 0     amLODIPine-benazepril (LOTREL 5-20) 5-20 mg per capsule TAKE 1 CAPSULE BY MOUTH EVERY DAY 90 capsule 3     aspirin 81 mg chewable tablet Chew 81 mg daily.       hydrocortisone 1 % ointment Apply topically as needed.       naproxen sodium (ALEVE) 220 MG tablet Take 220 mg by mouth 2 (two) times a day with meals.        atorvastatin (LIPITOR) 10 MG tablet Take 1 tablet (10 mg total) by mouth daily. 30 tablet 11     docoshexanoic acid-eicosapent 500 mg (FISH OIL) 500-100 mg cap capsule Take 500 mg by mouth.       sildenafil (REVATIO) 20 mg tablet Two to four tabs as needed for erectile dysfunction 30 tablet 5     No current facility-administered medications for this visit.        Total data points: 4

## 2021-06-15 NOTE — TELEPHONE ENCOUNTER
Reason for Call:  Medication or medication refill:    Do you use a [facility]?  Name of the pharmacy and phone number for the current request: [facility]  PHARMACY HAS  BEEN WAITING FOR RESPONSE FOR 2 WEEKS- SENT ANOTHER REQUEST 2 DAYS AGO    Name of the medication requested: SILDENAFIL - PATIENT IS OUT MEDICATION    Other request: N/A    Can we leave a detailed message on this number? Yes    Phone number patient can be reached at: Cell number on file:    Telephone Information:   Mobile [phone]       Best Time: ANYTIME    Call taken on 3/10/2021 at 11:50 AM by Noemí Felder

## 2021-06-15 NOTE — TELEPHONE ENCOUNTER
For the last two days Jhonny has been having dizziness and goes away after a couple of hours.  Dizziness is worse in the am that he is using the walls to walk and as the day continues on he gets better.  Denies vomiting.  Denies double vision.  Denies fever cough and shortness of breath.  Jhonny is requesting an in person clinic visit.    COVID 19 Nurse Triage Plan/Patient Instructions    Please be aware that novel coronavirus (COVID-19) may be circulating in the community. If you develop symptoms such as fever, cough, or SOB or if you have concerns about the presence of another infection including coronavirus (COVID-19), please contact your health care provider or visit [email or web address].     Disposition/Instructions    In-Person Visit with provider recommended. Reference Visit Selection Guide.    Thank you for taking steps to prevent the spread of this virus.  o Limit your contact with others.  o Wear a simple mask to cover your cough.  o Wash your hands well and often.    Resources    [facility]: About COVID-19: [email or web address]    CDC: What to Do If You're Sick: www.cdc.gov/coronavirus/2019-ncov/about/steps-when-sick.html    CDC: Ending Home Isolation: www.cdc.gov/coronavirus/2019-ncov/hcp/disposition-in-home-patients.html     CDC: Caring for Someone: www.cdc.gov/coronavirus/2019-ncov/if-you-are-sick/care-for-someone.html     [facility]: Interim Guidance for Hospital Discharge to Home: www.health.[facility].mn.us/diseases/coronavirus/hcp/hospdischarge.pdf    [facility] clinical trials (COVID-19 research studies): [email or web address].[email or web address]/umn-clinical-trials     Below are the COVID-19 hotlines at the [place] Department of Health ([facility]). Interpreters are available.   o For health questions: Call 825-690-7275 or 1-464.934.2912 (7 a.m. to 7 p.m.)  o For questions about schools and childcare: Call 463-195-3891 or 1-666.343.5677 (7 a.m. to 7 p.m.)       Reason for Disposition    Lightheadedness  (dizziness) present now, after 2 hours of rest and fluids    Additional Information    Negative: Shock suspected (e.g., cold/pale/clammy skin, too weak to stand, low BP, rapid pulse)    Negative: Difficult to awaken or acting confused (e.g., disoriented, slurred speech)    Negative: Fainted, and still feels dizzy afterwards    Negative: SEVERE difficulty breathing (e.g., struggling for each breath, speaks in single words)    Negative: Overdose (accidental or intentional) of medications    Negative: New neurologic deficit that is present now: * Weakness of the face, arm, or leg on one side of the body * Numbness of the face, arm, or leg on one side of the body * Loss of speech or garbled speech    Negative: Heart beating < 50 beats per minute OR > 140 beats per minute    Negative: Sounds like a life-threatening emergency to the triager    Negative: SEVERE dizziness (e.g., unable to stand, requires support to walk, feels like passing out now)    Negative: SEVERE headache or neck pain    Negative: Spinning or tilting sensation (vertigo) present now and one or more stroke risk factors (i.e., hypertension, diabetes, prior stroke/TIA, heart attack, age over 60) (Exception: prior physician evaluation for this AND no different/worse than usual)    Negative: Loss of vision or double vision    Negative: Extra heart beats OR irregular heart beating (i.e., 'palpitations')    Negative: Difficulty breathing    Negative: Drinking very little and has signs of dehydration (e.g., no urine > 12 hours, very dry mouth, very lightheaded)    Negative: Follows bleeding (e.g., stomach, rectum, vagina) (Exception: became dizzy from sight of small amount blood)    Negative: Patient sounds very sick or weak to the triager    Protocols used: DIZZINESS-A-OH

## 2021-06-15 NOTE — TELEPHONE ENCOUNTER
New Appointment Needed  What is the reason for the visit:    Same Date/Next Day Appt Request  What is the reason for your visit?: see rn triage dizziness vertigo    Provider Preference: Any available  How soon do you need to be seen?: today  Waitlist offered?: No  Okay to leave a detailed message:  Yes

## 2021-06-15 NOTE — PROGRESS NOTES
ASSESSMENT:  1.  Benign positional vertigo:  Current symptoms seem consistent with this, and not due to an acute neurologic event, nonischemic cardiomyopathy, or hypotension.  He will be referred for repositioning maneuvers.    2.  Nonischemic cardiomyopathy:  He currently feels well.  Ejection fraction was up to 45%, his last echocardiogram.  He does have a defibrillator.    3.  General health maintenance:  He is due for a yearly exam  PLAN:  1.  PT/OT referral for evaluation and treatment of benign positional vertigo    2.  Schedule yearly exam with monitoring labs at that time.    3.  Covid vaccine when available.    Orders Placed This Encounter   Procedures     Ambulatory referral to PT/OT     Referral Priority:   Routine     Referral Type:   Physical Therapy or Occupational Therapy     Referral Reason:   Evaluation and Treatment     Requested Specialty:   Physical Therapy     Number of Visits Requested:   1     There are no discontinued medications.    Return in about 4 weeks (around 3/26/2021) for Annual physical.    ASSESSED PROBLEMS:  1. Benign paroxysmal positional vertigo, unspecified laterality  Ambulatory referral to PT/OT       CHIEF COMPLAINT:  Chief Complaint   Patient presents with     Dizziness       HISTORY OF PRESENT ILLNESS:  Cj is a 65 y.o. male who presents complaining of positional vertigo.  He reports that he first noted this several days ago when he rolled over to his right in bed.  This provoked a spinning type sensation that improved over a number of minutes.  He felt nauseated and vaguely unbalanced after this.  He had a second more severe episode with change in position the next day.  Since then he has not had severe spells but feels vaguely off balance with mild nausea.  He did report minor tinnitus but no hearing loss.    There is no associated syncope, focal weakness, or problems with speech or vision.  He has a history of nonischemic cardiomyopathy and does have a defibrillator  "in place.  Reports that otherwise he has done well since last visit with no orthopnea, PND, peripheral edema, or decrease in exercise tolerance    REVIEW OF SYSTEMS:    Comprehensive review of systems is negative.    PFSH:  .  Has a [employer].  Likes to [place] in the [facility]    TOBACCO USE:  Social History     Tobacco Use   Smoking Status Former Smoker   Smokeless Tobacco Never Used   Tobacco Comment    35 years ago       VITALS:  Vitals:    02/26/21 1001   BP: 118/74   Patient Site: Left Arm   Patient Position: Sitting   Cuff Size: Adult Regular   Pulse: 72   SpO2: 98%   Weight: (!) 257 lb 1.6 oz (116.6 kg)   Height: 5' 9\" (1.753 m)     Wt Readings from Last 3 Encounters:   02/26/21 (!) 257 lb 1.6 oz (116.6 kg)   02/21/20 (!) 250 lb (113.4 kg)   02/26/19 (!) 245 lb (111.1 kg)       PHYSICAL EXAM:  Constitutional:   Reveals an alert pleasant stocky man who does not seem in acute distress.    Vitals: per nursing notes.  HEENT:  Ears:  External canals, TMs clear.    Eyes:  EOMs full, PERRL.  No nystagmus  Oropharynx:   Mouth and throat clear, no thrush or exudate.  Neck:  Supple, no carotid bruits or adenopathy.  Back:  No spine or CVA pain.  Thorax:  No bony deformities.  Defibrillator left chest  Lungs: Clear to A&P without rales or wheezes.  Respiratory effort normal.  Cardiac:   Regular rate and rhythm, normal S1, S2, no murmur or gallop.  Abdomen:  Soft, active bowel sounds without bruits, mass, or tenderness.  Extremities:   No peripheral edema, pulses in the feet intact.    Skin:  No jaundice, peripheral cyanosis or lesions to suggest malignancy.  Neuro:  Alert and oriented.  No dysarthria or aphasia.  Cranial nerves, motor, sensory exams are intact.  No gross focal deficits.  Vertigo could not be provoked with rapid change in position  Psychiatric:  Memory intact, mood appropriate.    DATA REVIEWED:  Additional History from Old Records Summarized (2): None.  Decision to Obtain Records (1): " None.   Radiology Tests Summarized or Ordered (1): None.  Labs Reviewed or Ordered (1): Previous labs reviewed  Medicine Test Summarized or Ordered (1):  previous echocardiogram reviewed  Independent Review of EKG or X-RAY(2 each): None.    The visit lasted a total of 25 minutes face to face with the patient. Over 50% of the time was spent counseling and educating the patient about management of benign positional vertigo.    Dragon dictation was used for this note. Speech recognition errors are a possibility.     MEDICATIONS:  Current Outpatient Medications   Medication Sig Dispense Refill     allopurinoL (ZYLOPRIM) 100 MG tablet Take 2 tablets (200 mg total) by mouth daily. 180 tablet 3     aspirin 81 mg chewable tablet Chew 81 mg daily.       co-enzyme Q-10 30 mg capsule Take 30 mg by mouth daily.       furosemide (LASIX) 20 MG tablet Take 20 mg by mouth daily.       hydrocortisone 1 % ointment Apply topically as needed.       lisinopril (PRINIVIL,ZESTRIL) 20 MG tablet Take 20 mg by mouth 2 (two) times a day.        metoprolol tartrate (LOPRESSOR) 25 MG tablet Take 25 mg by mouth 2 (two) times a day. Taking 25 mg q am and 50 mg q pm       naproxen sodium (ALEVE) 220 MG tablet Take 220 mg by mouth 2 (two) times a day with meals.        pravastatin (PRAVACHOL) 40 MG tablet Take 40 mg by mouth daily.       sildenafil (REVATIO) 20 mg tablet Two to four tabs as needed for erectile dysfunction 30 tablet 5     No current facility-administered medications for this visit.

## 2021-06-15 NOTE — PROGRESS NOTES
Discharge Summary  Patient Name: Cj Randhawa  Date: 5/5/2021  Referral Diagnosis: BPPV  Referring provider: Jasper Sorensen MD  Visit Diagnosis:   1. Dizziness     2. Unsteadiness on feet     3. Decreased activities of daily living (ADL)         Goal status: Unable to assess as patient did not return.    Patient was seen for 1 visit. The patient discontinued therapy, did not return.    The patient will need a new referral to resume.    Thank you for your referral.  Yamileth Conner  5/5/2021   3:18 PM     Vestibular Initial Evaluation    Patient Name: Cj Randhawa  Date of evaluation: 3/8/2021  Referral Diagnosis: BPPV  Referring provider: Jasper Sorensen MD  Visit Diagnosis:     ICD-10-CM    1. Dizziness  R42    2. Unsteadiness on feet  R26.81    3. Decreased activities of daily living (ADL)  Z78.9        Assessment:      Symptoms are consistent with weakened vestibular function. Patient instructed on adaptation and substitution exercises today to facilitate vestibular compensation.    Goals:  Patient will bend: to dress;without dizziness;without loss of balance;in 12 weeks  Patient able to perform bed mobility: without dizziness;in 12 weeks  Patient will turn head: for conversation;without dizziness;in 12 weeks      Patient's expectations/goals are realistic.    Barriers to Learning or Achieving Goals:  No Barriers.       Plan / Patient Instructions:        Plan of Care:   Communication with: Referral Source  Patient Related Instruction: Nature of Condition;Treatment plan and rationale;Basis of treatment;Expected outcome  Times per Week: 1  Number of Weeks: 12  Number of Visits: 12  Select Plan of Care: Select  Neuromuscular Reeducation: vestibular  Functional Training (ADL's): compensatory training;ergonomics;ADL's  Canolith Repostioning: .      POC and pathology of condition were reviewed with patient.  Pt. is in agreement with the Plan of Care. Treatment techniques, plan of care, and goals were  discussed with the patient.  The patient agrees to the plan as outlined.  The plan of care is dynamic and will be modified on an ongoing basis.       Subjective:         History of Present Illness:    Cj is a 65 y.o. male who presents to therapy today with complaints of vertigo, nausea and usnteadiness. Patient had sudden onset of symptoms on 21. Symptoms are getting better. He denies history of similar symptoms. Patient denies ear pain. Patient denies hearing changes. Functional limitations are described as occurring with balance, bending, bed mobility, head turns, looking up or down, stepping over curbs.     Pain Ratin       Objective:      Note: Items left blank indicates the item was not performed or not indicated at the time of the evaluation.    Patient Outcome Measures:   No data recorded     Vestibular Disorder Examination  1. Dizziness     2. Unsteadiness on feet     3. Decreased activities of daily living (ADL)         Precautions/Restrictions: None    Posture Observation: General standing posture is normal.    ROM:  Not Tested    Strength: Not Tested    Sensation: Patient reports numbness in feet from spinal stenosis    Functional Mobility: good      Modified CTSIB:  Normal Surface Eyes Open-Normal  Normal Surface Eyes Closed-Moderate sway  Perturbed Surface Eyes Open-Normal  Perturbed Surface Eyes Closed-Moderate    The remainder of the tests are performed in room light.    Oculomotor Assessment:  Spontaneous Nystagmus with fixation: Negative  Spontaneous Nystagmus without fixation: NT  Gaze-evoked nystagmus: Negative  Smooth pursuit: Normal  Saccades: Normal  Rapid Head Shake Test: Negative for nystagmus but mild dizziness  VOR Head Thrust: NT  Static Visual Acuity: NT  Dynamic Visual Acuity: NT    Positional Tests:  Hallpike Right:  Negative  Hallpike Left:  Negative  Head Roll Right: NT  Head Roll Left:  NT    Plan for next visit: progress exercises if patient still  symptomatic    Treatment Today: Patient instructed on adaptation and substitution exercises today to facilitate vestibular compensation.  X1 viewing-30 seconds-instructed  Targets-40 seconds-instructed  Normal surface eyes closed-instructed  TREATMENT MINUTES COMMENTS   Evaluation 20    Self-care/ Home management     Neuromuscular Re-education 15    Canalith repositioning procedure           Total 35    Blank areas are intentional and mean the treatment did not include these items.     GOALS AND PLAN OF CARE WERE ESTABLISHED IN COOPERATION WITH THE PATIENT    OT Evaluation Code: (Please list factors)   Comorbidities:   Patient Active Problem List   Diagnosis     Cervical Spine Stenosis From C ___     Mixed hyperlipidemia     Cholelithiasis     Distal Ureteral Stone On The Left     Male Erectile Disorder     Thalassemia Anemia     Gout     Essential hypertension     Benign Tubular Adenoma Of The Large Intestine     Dermatitis Due To Contact With Poison Fanta     Severe obesity with body mass index (BMI) of 35.0 to 35.9 and comorbidity (H)     Biventricular ICD (implantable cardioverter-defibrillator) in place     Non-ischemic cardiomyopathy (H)     Malignant melanoma of left lower leg (H)     Spinal stenosis of lumbar region with neurogenic claudication      Profile/History Review: Brief    Need for eval modification: No     # Treatment options: Limited   Clinical Decision Making:  Low  Occupational Profile/ Medical and Therapy History and Comorbidities Occupational Performance Clinical Decision Making   (Complexity)   brief history with review of medical/therapy records related to the presenting problem.  No comorbidities 1-3 Performance deficits that result in activity limitations and/or participation restrictions.    No Assessment Modification  Low complexity, which includes  problem-focused assessments, and consideration of a limited number of treatment options.      expanded review of medical/therapy records and  additional review of physical, cognitive and psychosocial history.    May have comorbidities 3-5 Performance deficits that result in activity limitations and/or participation restrictions.    Minimal to moderate modification of assessment Moderate complexity, which includes analysis of data from detailed assessments, and consideration of several treatment options.         Review of medical/therapy records and extensive additional review of physical, cognitive and psychosocial history.  Comorbidities affect occupational performance 5 or more Performance deficits that result in activity limitations and/or participation restrictions.    Significant modification of assessment High complexity, analysis of  Occupational profile and data,  Comprehensive assessments, multiple treatment options.            Yamileth Conner  3/8/2021  8:05 AM

## 2021-06-15 NOTE — PATIENT INSTRUCTIONS - HE
1.  Therapy consult for repositioning maneuvers for benign positional vertigo.    2.  Covid vaccine when available.    3.  Schedule yearly exam in the future.

## 2021-06-16 PROBLEM — I42.8 NON-ISCHEMIC CARDIOMYOPATHY (H): Status: ACTIVE | Noted: 2018-06-27

## 2021-06-16 PROBLEM — E66.01 SEVERE OBESITY WITH BODY MASS INDEX (BMI) OF 35.0 TO 35.9 AND COMORBIDITY (H): Status: ACTIVE | Noted: 2017-11-10

## 2021-06-16 PROBLEM — Z95.810 BIVENTRICULAR ICD (IMPLANTABLE CARDIOVERTER-DEFIBRILLATOR) IN PLACE: Status: ACTIVE | Noted: 2018-06-27

## 2021-06-16 PROBLEM — M48.062 SPINAL STENOSIS OF LUMBAR REGION WITH NEUROGENIC CLAUDICATION: Status: ACTIVE | Noted: 2020-02-21

## 2021-06-17 NOTE — PATIENT INSTRUCTIONS - HE
1.  Recheck labs in about two to three months.    2.  Shingrix vaccine today with booster in three months    3.  Continue current medications    4.  See in one year or as needed.

## 2021-06-17 NOTE — TELEPHONE ENCOUNTER
RN cannot approve Refill Request    RN can NOT refill this medication PCP messaged that patient is overdue for Labs. Last office visit: 2/26/2021 Jasper Sorensen MD Last Physical: 2/21/2020 Last MTM visit: Visit date not found Last visit same specialty: 2/26/2021 Jasper Sorensen MD.  Next visit within 3 mo: Visit date not found  Next physical within 3 mo: Visit date not found      Carmela Cheema, [facility] Triage/Med Refill 5/13/2021    Requested Prescriptions   Pending Prescriptions Disp Refills     allopurinoL (ZYLOPRIM) 100 MG tablet [Pharmacy Med Name: ALLOPURINOL 100MG TABLETS] 180 tablet 3     Sig: TAKE 1 TABLET BY MOUTH TWICE DAILY       Allopurinol/Febuxostat Refill Protocol  Failed - 5/13/2021  7:41 AM        Failed - LFT or AST or ALT in last 12 months     Albumin   Date Value Ref Range Status   02/14/2020 4.0 3.5 - 5.0 g/dL Final     Bilirubin, Total   Date Value Ref Range Status   02/14/2020 0.7 0.0 - 1.0 mg/dL Final     Alkaline Phosphatase   Date Value Ref Range Status   02/14/2020 61 45 - 120 U/L Final     AST   Date Value Ref Range Status   02/14/2020 24 0 - 40 U/L Final     ALT   Date Value Ref Range Status   02/14/2020 32 0 - 45 U/L Final     Protein, Total   Date Value Ref Range Status   02/14/2020 6.5 6.0 - 8.0 g/dL Final                Passed - Visit with PCP or prescribing provider visit in past 12 months     Last office visit with prescriber/PCP: 2/26/2021 Jasper Sorensen MD OR same dept: 2/26/2021 Jasper Sorensen MD OR same specialty: 2/26/2021 Jasper Sorensen MD  Last physical: 2/21/2020 Last MTM visit: Visit date not found   Next visit within 3 mo: Visit date not found  Next physical within 3 mo: Visit date not found  Prescriber OR PCP: Jasper Sorensen MD  Last diagnosis associated with med order: 1. Gout  - allopurinoL (ZYLOPRIM) 100 MG tablet [Pharmacy Med Name: ALLOPURINOL 100MG TABLETS]; TAKE 1 TABLET BY MOUTH TWICE DAILY  Dispense: 180 tablet; Refill: 3    If protocol  passes may refill for 12 months if within 3 months of last provider visit (or a total of 15 months).

## 2021-06-17 NOTE — PROGRESS NOTES
"Jhonny: The PSA, (prostate-specific antigen), has increased to 4.9.  I would advise rechecking a free and total PSA in 2 to 3 months.  I will put in a \"lab only \"order for this.  The uric acid is high at 9.4 despite taking allopurinol.  I would advise increasing this to 300 mg daily.  I will send in a prescription for 300 mg tabs to start when you run out of your current supply.  Kidney function is mildly reduced with a BUN of 26 and a creatinine of 1.37.  This will be monitored in the future.  Lipids are notable for a total cholesterol of 204 and an LDL fraction of 111.  Triglycerides would decrease with weight loss.  Potassium is mildly elevated.  This is in the same range as recent checks.  Hemoglobin is stable.  Your blood sugar and kidney tests are good.  We will recheck the uric acid on the higher allopurinol dose at the time the PSA is rechecked.    Jasper Sorensen MD"

## 2021-06-19 ENCOUNTER — HEALTH MAINTENANCE LETTER (OUTPATIENT)
Age: 66
End: 2021-06-19

## 2021-06-20 NOTE — LETTER
Letter by Jasper Sorensen MD at      Author: Jasper Sorensen MD Service: -- Author Type: --    Filed:  Encounter Date: 8/13/2020 Status: (Other)         Cj Randhawa  [address] [facility] [address]             August 13, 2020         Dear Mr. Randhawa,    Below are the results from your recent visit:    The cardiac echo revealed an ejection fraction of 45%.  This is similar to your reading from 1 year ago.  It is much better than the ejection fraction of 15 to 20% and 2017.        Please call with questions or contact us using [email or web address].    Sincerely,        Electronically signed by Jasper Sorensen MD

## 2021-06-20 NOTE — LETTER
Letter by Jasper Sorensen MD at      Author: Jasper Sorensen MD Service: -- Author Type: --    Filed:  Encounter Date: 2/17/2020 Status: (Other)         Cj Randhawa  [address] [facility] [address]             February 17, 2020         Dear Mr. Randhawa,    Below are the results from your recent visit:    Resulted Orders   PSA (Prostatic-Specific Antigen), Annual Screen   Result Value Ref Range    PSA 3.8 0.0 - 4.5 ng/mL    Narrative    Method is Abbott Prostate-Specific Antigen (PSA)  Standard-WHO 1st International (90:10)   Comprehensive Metabolic Panel   Result Value Ref Range    Sodium 138 136 - 145 mmol/L    Potassium 5.2 (H) 3.5 - 5.0 mmol/L    Chloride 103 98 - 107 mmol/L    CO2 28 22 - 31 mmol/L    Anion Gap, Calculation 7 5 - 18 mmol/L    Glucose 104 70 - 125 mg/dL    BUN 15 8 - 22 mg/dL    Creatinine 0.91 0.70 - 1.30 mg/dL    GFR MDRD Af Amer >60 >60 mL/min/1.73m2    GFR MDRD Non Af Amer >60 >60 mL/min/1.73m2    Bilirubin, Total 0.7 0.0 - 1.0 mg/dL    Calcium 9.6 8.5 - 10.5 mg/dL    Protein, Total 6.5 6.0 - 8.0 g/dL    Albumin 4.0 3.5 - 5.0 g/dL    Alkaline Phosphatase 61 45 - 120 U/L    AST 24 0 - 40 U/L    ALT 32 0 - 45 U/L    Narrative    Fasting Glucose reference range is 70-99 mg/dL per  American Diabetes Association (ADA) guidelines.   Uric Acid   Result Value Ref Range    Uric Acid 6.7 3.0 - 8.0 mg/dL   Lipid Cascade   Result Value Ref Range    Cholesterol 208 (H) <=199 mg/dL    Triglycerides 364 (H) <=149 mg/dL    HDL Cholesterol 34 (L) >=40 mg/dL    LDL Calculated 101 <=129 mg/dL    Patient Fasting > 8hrs? Yes    HM2(CBC w/o Differential)   Result Value Ref Range    WBC 10.2 4.0 - 11.0 thou/uL    RBC 6.30 (H) 4.40 - 6.20 mill/uL    Hemoglobin 12.3 (L) 14.0 - 18.0 g/dL    Hematocrit 41.1 40.0 - 54.0 %    MCV 65 (L) 80 - 100 fL    MCH 19.5 (L) 27.0 - 34.0 pg    MCHC 29.9 (L) 32.0 - 36.0 g/dL    RDW 18.4 (H) 11.0 - 14.5 %    Platelets 183 140 - 440 thou/uL   Urinalysis-UC if Indicated   Result  Value Ref Range    Color, UA Yellow Colorless, Yellow, Straw, Light Yellow    Clarity, UA Clear Clear    Glucose, UA Negative Negative    Bilirubin, UA Negative Negative    Ketones, UA Negative Negative    Specific Gravity, UA 1.025 1.005 - 1.030    Blood, UA Negative Negative    pH, UA 5.5 5.0 - 8.0    Protein, UA Negative Negative mg/dL    Urobilinogen, UA 0.2 E.U./dL 0.2 E.U./dL, 1.0 E.U./dL    Nitrite, UA Negative Negative    Leukocytes, UA Negative Negative    Narrative    Microscopic not indicated  UC not indicated       Jhonny:  The PSA, (prostate specific antigen), is in the normal range at 3.8.  Your uric acid is good at 6.7.  Continue the allopurinol.  Your hemoglobin slightly low due to thalassemia.  This is not of concern.  Total cholesterol is mildly elevated to 208.  Triglycerides are elevated to 364.  Treatment will be reviewed in clinic.  The potassium is acceptable.  Other labs including your blood sugar, liver and kidney tests are normal.    Please call with questions or contact us using [employer].    Sincerely,        Electronically signed by Jasper Sorensen MD

## 2021-06-22 NOTE — PROGRESS NOTES
ASSESSMENT:  1.  Health care maintenance:  Jhonny's previsit labs were reviewed.  Overall lab studies look good.  Health maintenance and screening issues were reviewed.  Shingrix was advised     2.  Erectile dysfunction, unspecified erectile dysfunction type  Is interested in a trial of sildenafil  - sildenafil, antihypertensive, (REVATIO) 20 mg tablet; Two to four tabs as needed for erectile dysfunction.  Dispense: 30 tablet; Refill: 5    2. History of colonic polyps  Colonoscopy is due  - Ambulatory referral for Colonoscopy    3.  Nonischemic cardiomyopathy:  He does have a defibrillator.  Ejection fraction was 20-25% in February 2018.  Feels surprisingly good with no peripheral edema, orthopnea, or symptoms to suggest active CHF at the present.  Is followed at [facility].  He will have a repeat echocardiogram in the near future.    4.  Hypercholesterolemia:  Cholesterol is acceptable with pravastatin    5.  Hyperuricemia with gout:  On low-dose allopurinol.  The uric acid was acceptable    6.  History of essential hypertension:  Blood pressure currently is good he is on metoprolol and lisinopril given the history of systolic heart failure    7.  Moderate obesity:  Weight loss and regular exercise were encouraged  PLAN:  Patient Instructions   1.  Schedule colonoscopy    2.  Check insurance for Shingrix    3.  Try Sildenifil 20 mg Two to four tabs as needed for erectile dysfunction    4.  Previsit labs were reviewed.    5.  Continue all chronic medications at usual doses    6.  Work on exercise and weight loss.    7.  See in 1 year or as needed  Orders Placed This Encounter   Procedures     Ambulatory referral for Colonoscopy     Referral Priority:   Routine     Referral Type:   Colonoscopy     Referral Reason:   Evaluation and Treatment     Requested Specialty:   Gastroenterology     Number of Visits Requested:   1     Medications Discontinued During This Encounter   Medication Reason     atorvastatin  (LIPITOR) 10 MG tablet Therapy completed     docoshexanoic acid-eicosapent 500 mg (FISH OIL) 500-100 mg cap capsule Therapy completed       No Follow-up on file.    ASSESSED PROBLEMS:  1. Erectile dysfunction, unspecified erectile dysfunction type  sildenafil, antihypertensive, (REVATIO) 20 mg tablet   2. History of colonic polyps  Ambulatory referral for Colonoscopy       CHIEF COMPLAINT:  Chief Complaint   Patient presents with     Annual Exam     dicuss recent blood work done      Immunizations     Declined flu     Medication Refill       HISTORY OF PRESENT ILLNESS:  Cj is a 63 y.o. male presenting to the clinic today for an annual physical exam and with complaints of hip pain.     Hip Pain: His hips have been bothering him quite a bit lately. He is able to move around fine in the morning, but it bothers him more later in the day. The pain is mostly in the lateral aspect of the right hip, and he occasionally gets shooting pains down into the lower leg. The more active he is during the day, the more it starts to bother him. The right side is worse, but he does experience some discomfort in his left hip as well. He notes that he has fallen on his hips a lot in the past playing [employer].     Health Maintenance: He is not interested in the flu shot today. He has had the Zostavax but the Shingrix is recommended today. He has been seeing dermatology twice yearly but will go to once yearly soon. He is due for a colonoscopy.     REVIEW OF SYSTEMS:   Cardiomyopathy: He has had his pacemaker adjusted a few times and recently had it interrogated. Everything has been going well according to cardiology. His last echo in May showed an ejection fraction of 20-25%. He is due for another one in January.  Hyperlipidemia: He is taking pravastatin now instead of atorvastatin. He is not taking fish oil.   His breathing has been fine. It is improved from before. He denies trouble with extremity swelling or shortness of breath at  night. He has been going to a chiropractor which has helped his range of motion in his neck. He continues to take Aleve. All other systems are negative.    PFSH:  He has been spending some time in the [place] and will be back there later this winter. He hopes to increase his exercise soon.     Social History     Tobacco Use   Smoking Status Never Smoker   Smokeless Tobacco Never Used       Family History   Problem Relation Age of Onset     Kidney failure Mother      Diabetes type II Mother      Gout Other      Lung cancer Father        Social History     Socioeconomic History     Marital status:      Spouse name: Not on file     Number of children: Not on file     Years of education: Not on file     Highest education level: Not on file   Social Needs     Financial resource strain: Not on file     Food insecurity - worry: Not on file     Food insecurity - inability: Not on file     Transportation needs - medical: Not on file     Transportation needs - non-medical: Not on file   Occupational History     Occupation: Supervisor     Employer: [employer]   Tobacco Use     Smoking status: Never Smoker     Smokeless tobacco: Never Used   Substance and Sexual Activity     Alcohol use: Yes     Drug use: No     Sexual activity: Not on file   Other Topics Concern     Not on file   Social History Narrative    He works for [employer] as a manager.  He is  and has 3 children and 3 grandchildren.       Past Surgical History:   Procedure Laterality Date     INGUINAL HERNIA REPAIR       LA REVISE SECONDARY VARICOSITY      Description: Varicose Vein Ligation;  Recorded: 05/19/2009;       Allergies   Allergen Reactions     Simvastatin      Spironolactone Other (See Comments)       Active Ambulatory Problems     Diagnosis Date Noted     Cervical Spine Stenosis From C ___      Mixed hyperlipidemia      Cholelithiasis      Distal Ureteral Stone On The Left      Male Erectile Disorder       "Thalassemia Anemia      Gout      Essential hypertension      Benign Tubular Adenoma Of The Large Intestine      Dermatitis Due To Contact With Poison Ivy      Melanoma (H) 06/01/2016     Severe obesity with body mass index (BMI) of 35.0 to 35.9 and comorbidity (H) 11/10/2017     Resolved Ambulatory Problems     Diagnosis Date Noted     No Resolved Ambulatory Problems     Past Medical History:   Diagnosis Date     Coronary artery disease      Family history of myocardial infarction      Gout      High cholesterol      Hyperlipidemia      Hypertension      Thalassemia        VITALS:  Vitals:    12/11/18 0819   BP: 110/66   Patient Site: Left Arm   Patient Position: Sitting   Cuff Size: Adult Regular   Pulse: 74   Temp: 97.4  F (36.3  C)   TempSrc: Tympanic   SpO2: 97%   Weight: (!) 241 lb (109.3 kg)   Height: 5' 9\" (1.753 m)     Wt Readings from Last 3 Encounters:   12/11/18 (!) 241 lb (109.3 kg)   11/16/17 (!) 250 lb (113.4 kg)   11/10/17 (!) 250 lb 9.6 oz (113.7 kg)     Body mass index is 35.59 kg/m .    PHYSICAL EXAM:  Constitutional:   Reveals an alert, pleasant, talkative male. Affect appropriate. Does not appear acutely ill.  Vitals: per nursing notes.  HEENT:  Ears:  External canals, TMs clear.    Eyes:  EOMs full, PERRL.  Oropharynx:   Mouth and throat clear, no thrush or exudate.  Neck:  Supple, no carotid bruits or adenopathy.  Back:  No spine or CVA pain.  Thorax:  Defibrillator left upper chest    Lungs: Clear to A&P without rales or wheezes.  Respiratory effort normal.  Cardiac:   Regular rate and rhythm, normal S1, S2, no murmur or gallop.  Abdomen:  Soft, moderately obese, active bowel sounds without bruits, mass, or tenderness. Femoral pulses intact.   :  (Men)  No testicular masses, no penile lesions.  No inguinal hernias.  Rectal: No masses.   Prostate moderately enlarged, no suspicious nodules.   Extremities: Well developed. No significant joint deformities.   No peripheral edema, pulses in the " feet intact. Full range of motion of hips.   Skin: Numerous skin biopsy sites. 2 cm hemangioma left mid back.  No lesions to suggest malignancy.  Neuro:  Alert and oriented. Cranial nerves, motor, sensory exams are intact.  No gross focal deficits.  Psychiatric:  Memory intact, mood appropriate.      ADDITIONAL HISTORY SUMMARIZED (2): Reviewed 2013 colonoscopy report showing five year clearance.   DECISION TO OBTAIN EXTRA INFORMATION (1): None.   RADIOLOGY TESTS (1): Reviewed X-rays of the hips from 2009 showing no significant arthritis or abnormalities.   LABS (1): Labs from 11/26/2018 reviewed - potassium 5.2  MEDICINE TESTS (1): Reviewed echo from 5/22/2018 showing LVEF 20-25%  INDEPENDENT REVIEW (2 each): None.     The visit lasted a total of 29 minutes face to face with the patient. Over 50% of the time was spent counseling and educating the patient about general health maintenance and hip pain.    IYordy, am scribing for and in the presence of, Dr. Sorensen.    I, Jasper Sorensen, personally performed the services described in this documentation, as scribed by Yordy Valdes in my presence, and it is both accurate and complete.    Dragon dictation was used for this note.  Speech recognition errors are a possibility.    MEDICATIONS:  Current Outpatient Medications   Medication Sig Dispense Refill     allopurinol (ZYLOPRIM) 100 MG tablet TAKE 1 TABLET BY MOUTH TWICE DAILY 180 tablet 0     aspirin 81 mg chewable tablet Chew 81 mg daily.       co-enzyme Q-10 30 mg capsule Take 30 mg by mouth daily.       hydrocortisone 1 % ointment Apply topically as needed.       lisinopril (PRINIVIL,ZESTRIL) 20 MG tablet Take 20 mg by mouth daily.       metoprolol tartrate (LOPRESSOR) 25 MG tablet Take 25 mg by mouth 2 (two) times a day.       naproxen sodium (ALEVE) 220 MG tablet Take 220 mg by mouth 2 (two) times a day with meals.        pravastatin (PRAVACHOL) 40 MG tablet Take 40 mg by mouth daily.       sildenafil,  antihypertensive, (REVATIO) 20 mg tablet Two to four tabs as needed for erectile dysfunction. 30 tablet 5     No current facility-administered medications for this visit.        Total data points: 5

## 2021-06-23 NOTE — TELEPHONE ENCOUNTER
Refill Approved    Rx renewed per Medication Renewal Policy. Medication was last renewed on 11/9/18    Tahira Song, [facility] Triage/Med Refill 2/6/2019     Requested Prescriptions   Pending Prescriptions Disp Refills     allopurinol (ZYLOPRIM) 100 MG tablet [Pharmacy Med Name: ALLOPURINOL 100MG TABLETS] 180 tablet 0     Sig: TAKE 1 TABLET BY MOUTH TWICE DAILY    Allopurinol/Febuxostat Refill Protocol  Passed - 2/4/2019 11:02 AM       Passed - LFT or AST or ALT in last 12 months    Albumin   Date Value Ref Range Status   11/26/2018 4.0 3.5 - 5.0 g/dL Final     Bilirubin, Total   Date Value Ref Range Status   11/26/2018 0.8 0.0 - 1.0 mg/dL Final     Alkaline Phosphatase   Date Value Ref Range Status   11/26/2018 53 45 - 120 U/L Final     AST   Date Value Ref Range Status   11/26/2018 23 0 - 40 U/L Final     ALT   Date Value Ref Range Status   11/26/2018 25 0 - 45 U/L Final     Protein, Total   Date Value Ref Range Status   11/26/2018 6.6 6.0 - 8.0 g/dL Final               Passed - Visit with PCP or prescribing provider visit in past 12 months    Last office visit with prescriber/PCP: Visit date not found OR same dept: Visit date not found OR same specialty: 6/1/2016 Alondra Pritchett MD  Last physical: Visit date not found Last MTM visit: Visit date not found   Next visit within 3 mo: Visit date not found  Next physical within 3 mo: Visit date not found  Prescriber OR PCP: Ignacio Chand DO  Last diagnosis associated with med order: 1. Gout  - allopurinol (ZYLOPRIM) 100 MG tablet [Pharmacy Med Name: ALLOPURINOL 100MG TABLETS]; TAKE 1 TABLET BY MOUTH TWICE DAILY  Dispense: 180 tablet; Refill: 0    If protocol passes may refill for 12 months if within 3 months of last provider visit (or a total of 15 months).               Last physical was 12/11/18.

## 2021-06-25 NOTE — PROGRESS NOTES
TIME IN: 1:04 PM  TIME OUT:      ASSESSMENT:  1.  General health evaluation:  Jhonny is .  He owns an asphalt company.  He will be going on Medicare in the near future.  Health maintenance and screening issues were reviewed.    2.  Obesity:  BMI is 37.5.  Weight loss and regular exercise were encouraged    3.  Nonischemic cardiomyopathy:  Ejection fraction was up to 45% on his last echo.  He does have a defibrillator.  Cardiology follow-up is scheduled for August.    4.  History of melanoma:  This was excised from the left lower leg.  He does have regular dermatology visits.    5.  Hyperuricemia with gout:  Previsit uric acid was elevated to 9.4.  Allopurinol has been increased to 300 mg daily.  Labs will be rechecked in several months.  He has not had any recent episodes of symptomatic gout    6.  Renal insufficiency stage IIIa:  GFR was 52 on previsit labs.  This will be monitored.    7.  Abnormal PSA:  PSA was up to 4.9 on previsit labs.  Free and total PSA will be rechecked in several months    8.  History of colonic polyps:  Last colonoscopy was by Dr. Harman in 2/19.  5-year follow-up was advised at that time    9.  Lumbar spinal stenosis:  He notes moderate low back discomfort    10.  Mixed hyperlipidemia:  He was switched from pravastatin to Crestor at last cardiology appointment.  He still notes considerable joint and muscle stiffness.  This did not change appreciably with a trial off the statin    11.  Thalassemia:  Microcytic anemia is the same as when previously checked    12.  Essential hypertension:  Blood pressure is under good control on current regimen    PLAN:  1.  Previsit labs were reviewed    2.  Allopurinol was increased to 300 mg daily.  Recheck uric acid along with free and total PSA in 2 to 3 months    3.  Check TSH, sed rate and C-reactive protein, and total CK when labs are next drawn.    4.  Work on exercise and weight loss    5.  Next colonoscopy due 2/24    6.  See in 1 year or as  needed    7.  Shingrix vaccine with first vaccine today    Orders Placed This Encounter   Procedures     Varicella Zoster, Recombinant Vaccine IM     CK Total     Standing Status:   Future     Standing Expiration Date:   5/25/2022     Erythrocyte Sedimentation Rate     Standing Status:   Future     Standing Expiration Date:   5/25/2022     C-Reactive Protein(CRP)     Standing Status:   Future     Standing Expiration Date:   5/25/2022     Thyroid Stimulating Hormone (TSH)     Standing Status:   Future     Standing Expiration Date:   5/25/2022     There are no discontinued medications.    Return in about 1 year (around 5/25/2022) for Annual physical.    ASSESSED PROBLEMS:  1. Routine general medical examination at a health care facility     2. Severe obesity with body mass index (BMI) of 35.0 to 35.9 and comorbidity (H)     3. Non-ischemic cardiomyopathy (H)     4. Malignant melanoma of left lower leg (H)     5. Mixed hyperlipidemia     6. Thalassemia Anemia     7. Gout     8. Essential hypertension     9. Benign neoplasm of colon, unspecified part of colon     10. Spinal stenosis of lumbar region with neurogenic claudication     11. Need for shingles vaccine  Varicella Zoster, Recombinant Vaccine IM   12. Myalgia  CK Total    Erythrocyte Sedimentation Rate    C-Reactive Protein(CRP)   13. Chronic fatigue  Thyroid Stimulating Hormone (TSH)       CHIEF COMPLAINT:  Chief Complaint   Patient presents with     Annual Exam     had labs already        HISTORY OF PRESENT ILLNESS:  Cj is a 65 y.o. male presenting to the clinic today for general health evaluation.  Jhonny complains of muscle aching, stiffness and tightness in his hands.  He also notes diminished upper body strength.  He did not notice any change in symptoms going off of pravastatin.  He is now on low-dose Crestor.    He has a history of nonischemic cardiomyopathy with a defibrillator.  Ejection fraction was up to 45% on echocardiogram from 8/20.    He is  on metoprolol and lisinopril both for treatment of essential hypertension and CHF.  He is on furosemide 20 mg daily.  He has not had issues with peripheral edema on this    Allopurinol was increased from 200 to 300 mg daily due to a previsit uric acid of 9.4.    REVIEW OF SYSTEMS:   All other systems are negative.    PFSH:  .  Runs an asphalt company.  Has been turkey hunting in the spring.  [place] the [place] the [place].  Social History     Tobacco Use   Smoking Status Former Smoker   Smokeless Tobacco Never Used   Tobacco Comment    35 years ago       Family History   Problem Relation Age of Onset     Kidney failure Mother      Diabetes type II Mother      Gout Other      Lung cancer Father        Social History     Socioeconomic History     Marital status:      Spouse name: Not on file     Number of children: Not on file     Years of education: Not on file     Highest education level: Not on file   Occupational History     Occupation: Supervisor     Employer: [employer]   Social Needs     Financial resource strain: Not on file     Food insecurity     Worry: Not on file     Inability: Not on file     Transportation needs     Medical: Not on file     Non-medical: Not on file   Tobacco Use     Smoking status: Former Smoker     Smokeless tobacco: Never Used     Tobacco comment: 35 years ago   Substance and Sexual Activity     Alcohol use: Yes     Alcohol/week: 12.0 standard drinks     Types: 12 Cans of beer per week     Drug use: Yes     Types: Marijuana     Sexual activity: Not on file   Lifestyle     Physical activity     Days per week: Not on file     Minutes per session: Not on file     Stress: Not on file   Relationships     Social connections     Talks on phone: Not on file     Gets together: Not on file     Attends Rastafari service: Not on file     Active member of club or organization: Not on file     Attends meetings of clubs or organizations: Not on file     Relationship  status: Not on file     Intimate partner violence     Fear of current or ex partner: Not on file     Emotionally abused: Not on file     Physically abused: Not on file     Forced sexual activity: Not on file   Other Topics Concern     Not on file   Social History Narrative    He works for [employer] as a manager.  He is  and has 3 children and 3 grandchildren.       Past Surgical History:   Procedure Laterality Date     COLONOSCOPY N/A 2/26/2019    Procedure: COLONOSCOPY with polypectomy;  Surgeon: Lara Harman MD;  Location: [facility];  Service: Gastroenterology     INGUINAL HERNIA REPAIR       UT REVISE SECONDARY VARICOSITY      Description: Varicose Vein Ligation;  Recorded: 05/19/2009;       Allergies   Allergen Reactions     Simvastatin      Spironolactone Other (See Comments)       Active Ambulatory Problems     Diagnosis Date Noted     Cervical Spine Stenosis From C ___      Mixed hyperlipidemia      Cholelithiasis      Distal Ureteral Stone On The Left      Male Erectile Disorder      Thalassemia Anemia      Gout      Essential hypertension      Benign Tubular Adenoma Of The Large Intestine      Dermatitis Due To Contact With Poison Fanta      Severe obesity with body mass index (BMI) of 35.0 to 35.9 and comorbidity (H) 11/10/2017     Biventricular ICD (implantable cardioverter-defibrillator) in place 06/27/2018     Non-ischemic cardiomyopathy (H) 06/27/2018     Malignant melanoma of left lower leg (H) 02/02/2016     Spinal stenosis of lumbar region with neurogenic claudication 02/21/2020     Resolved Ambulatory Problems     Diagnosis Date Noted     Melanoma (H) 06/01/2016     Past Medical History:   Diagnosis Date     Coronary artery disease      Family history of myocardial infarction      Gout      High cholesterol      Hyperlipidemia      Hypertension      Thalassemia        VITALS:  Vitals:    05/25/21 1224   BP: 116/64   Patient Site: Left Arm   Patient Position:  "Sitting   Cuff Size: Adult Regular   Pulse: 76   SpO2: 97%   Weight: (!) 254 lb (115.2 kg)   Height: 5' 9\" (1.753 m)     Wt Readings from Last 3 Encounters:   05/25/21 (!) 254 lb (115.2 kg)   02/26/21 (!) 257 lb 1.6 oz (116.6 kg)   02/21/20 (!) 250 lb (113.4 kg)       PHYSICAL EXAM:  Constitutional:   Reveals an alert, pleasant, stocky male. Does not appear acutely ill. Affect appropriate.  Vitals: per nursing notes.  HEENT:  Ears:  External canals, TMs clear.    Eyes:  EOMs full, PERRL.  Oropharynx:   Mouth and throat clear, no thrush or exudate.  Neck:  Supple, no carotid bruits or adenopathy.  Back:  No spine or CVA pain.  Thorax:  No bony deformities.  Defibrillator left upper chest  Lungs: Clear to A&P without rales or wheezes.  Respiratory effort normal.  Cardiac:   Regular rate and rhythm, normal S1, S2, no murmur or gallop.  Breasts:   No masses, no axillary adenopathy.  Abdomen:  Soft, active bowel sounds without bruits, mass, or tenderness.  :  (Men)  No testicular masses, no penile lesions.    Rectal: No masses.   Prostate without nodules.  No inguinal hernias.  Extremities:   No peripheral edema, pulses in the feet intact.    Skin:  No jaundice, peripheral cyanosis or lesions to suggest malignancy.  Numerous pigmented nevi.:  Alia excision site left lower leg appears clear.  No tophi noted  Neuro:  Alert and oriented. Cranial nerves, motor, sensory exams are intact.  No gross focal deficits.  Psychiatric:  Memory intact, mood appropriate.    QUALITY MEASURES:  The following high BMI interventions were performed this visit: encouragement to exercise, weight monitoring and prescribed dietary intake    ADDITIONAL HISTORY SUMMARIZED (2): Cardiology notes reviewed  DECISION TO OBTAIN EXTRA INFORMATION (1): None.   RADIOLOGY TESTS (1): None.  LABS (1): Labs reviewed.  MEDICINE TESTS (1): Echocardiogram reviewed.  Colonoscopy reviewed  INDEPENDENT REVIEW (2 each): None.     The visit lasted a total of 30 " minutes face to face with the patient. Over 50% of the time was spent counseling and educating the patient about general health maintenance issues.    Dragon dictation was used for this note.  Speech recognition errors are a possibility.    MEDICATIONS:  Current Outpatient Medications   Medication Sig Dispense Refill     allopurinoL (ZYLOPRIM) 300 MG tablet Take 1 tablet (300 mg total) by mouth daily. 90 tablet 3     aspirin 81 mg chewable tablet Chew 81 mg daily.       co-enzyme Q-10 30 mg capsule Take 30 mg by mouth daily.       furosemide (LASIX) 20 MG tablet Take 20 mg by mouth daily.       hydrocortisone 1 % ointment Apply topically as needed.       lisinopril (PRINIVIL,ZESTRIL) 20 MG tablet Take 20 mg by mouth 2 (two) times a day.        metoprolol tartrate (LOPRESSOR) 25 MG tablet Take 25 mg by mouth 2 (two) times a day. Taking 25 mg q am and 50 mg q pm       naproxen sodium (ALEVE) 220 MG tablet Take 220 mg by mouth 2 (two) times a day with meals.        pravastatin (PRAVACHOL) 40 MG tablet Take 40 mg by mouth daily.       sildenafil (REVATIO) 20 mg tablet Two to four tabs as needed for erectile dysfunction 30 tablet 5     No current facility-administered medications for this visit.

## 2021-07-06 VITALS
DIASTOLIC BLOOD PRESSURE: 64 MMHG | BODY MASS INDEX: 37.62 KG/M2 | WEIGHT: 254 LBS | OXYGEN SATURATION: 97 % | HEIGHT: 69 IN | HEART RATE: 76 BPM | SYSTOLIC BLOOD PRESSURE: 116 MMHG

## 2021-07-22 ENCOUNTER — TELEPHONE (OUTPATIENT)
Dept: INTERNAL MEDICINE | Facility: CLINIC | Age: 66
End: 2021-07-22

## 2021-07-22 DIAGNOSIS — E79.0 HYPERURICEMIA: ICD-10-CM

## 2021-07-22 NOTE — TELEPHONE ENCOUNTER
Reason for Call:  Other call back    Detailed comments: pt calling stating he has a new pharmacy  [facility] [place]    All of his meds need to be sent over    Pt currently needs:   Allopurinol 300mg #30          Phone Number Patient can be reached at: Cell number on file:    Telephone Information:   Mobile [phone]       Best Time: anytime    Can we leave a detailed message on this number? YES    Call taken on 7/22/2021 at 3:29 PM by Noemí Felder

## 2021-07-23 RX ORDER — ALLOPURINOL 300 MG/1
300 TABLET ORAL DAILY
Qty: 90 TABLET | Refills: 3 | Status: SHIPPED | OUTPATIENT
Start: 2021-07-23 | End: 2022-08-20

## 2021-08-17 DIAGNOSIS — Z23 NEED FOR SHINGLES VACCINE: Primary | ICD-10-CM

## 2021-08-20 ENCOUNTER — ALLIED HEALTH/NURSE VISIT (OUTPATIENT)
Dept: FAMILY MEDICINE | Facility: CLINIC | Age: 66
End: 2021-08-20
Payer: MEDICARE

## 2021-08-20 ENCOUNTER — LAB (OUTPATIENT)
Dept: LAB | Facility: CLINIC | Age: 66
End: 2021-08-20
Payer: MEDICARE

## 2021-08-20 DIAGNOSIS — Z23 NEED FOR VACCINATION: Primary | ICD-10-CM

## 2021-08-20 DIAGNOSIS — R53.82 CHRONIC FATIGUE: ICD-10-CM

## 2021-08-20 DIAGNOSIS — M79.10 MYALGIA: ICD-10-CM

## 2021-08-20 DIAGNOSIS — R97.20 ABNORMAL PROSTATE SPECIFIC ANTIGEN (PSA): ICD-10-CM

## 2021-08-20 DIAGNOSIS — E79.0 HYPERURICEMIA: ICD-10-CM

## 2021-08-20 LAB
C REACTIVE PROTEIN LHE: 0.1 MG/DL (ref 0–0.8)
CK SERPL-CCNC: 260 U/L (ref 30–190)
ERYTHROCYTE [SEDIMENTATION RATE] IN BLOOD BY WESTERGREN METHOD: 2 MM/HR (ref 0–15)
TSH SERPL DL<=0.005 MIU/L-ACNC: 2 UIU/ML (ref 0.3–5)
URATE SERPL-MCNC: 6.3 MG/DL (ref 3–8)

## 2021-08-20 PROCEDURE — 82550 ASSAY OF CK (CPK): CPT | Performed by: INTERNAL MEDICINE

## 2021-08-20 PROCEDURE — 99000 SPECIMEN HANDLING OFFICE-LAB: CPT | Performed by: INTERNAL MEDICINE

## 2021-08-20 PROCEDURE — 90750 HZV VACC RECOMBINANT IM: CPT | Performed by: INTERNAL MEDICINE

## 2021-08-20 PROCEDURE — 86141 C-REACTIVE PROTEIN HS: CPT | Performed by: INTERNAL MEDICINE

## 2021-08-20 PROCEDURE — 84443 ASSAY THYROID STIM HORMONE: CPT | Performed by: INTERNAL MEDICINE

## 2021-08-20 PROCEDURE — 84550 ASSAY OF BLOOD/URIC ACID: CPT | Performed by: INTERNAL MEDICINE

## 2021-08-20 PROCEDURE — 84154 ASSAY OF PSA FREE: CPT | Mod: 90 | Performed by: INTERNAL MEDICINE

## 2021-08-20 PROCEDURE — 36415 COLL VENOUS BLD VENIPUNCTURE: CPT | Performed by: INTERNAL MEDICINE

## 2021-08-20 PROCEDURE — 99207 PR NO CHARGE NURSE ONLY: CPT | Performed by: INTERNAL MEDICINE

## 2021-08-20 PROCEDURE — 90471 IMMUNIZATION ADMIN: CPT | Performed by: INTERNAL MEDICINE

## 2021-08-20 PROCEDURE — 85652 RBC SED RATE AUTOMATED: CPT | Performed by: INTERNAL MEDICINE

## 2021-08-20 PROCEDURE — 84153 ASSAY OF PSA TOTAL: CPT | Mod: 90 | Performed by: INTERNAL MEDICINE

## 2021-08-20 NOTE — PROGRESS NOTES
Jhonny came in today for his 2nd shingle shot, see immunizations    Beth La CMA ([facility])

## 2021-08-22 LAB
PSA FREE MFR SERPL: 29 %
PSA FREE SERPL-MCNC: 1.7 NG/ML
PSA SERPL IA-MCNC: 5.8 NG/ML

## 2021-09-08 ENCOUNTER — TRANSFERRED RECORDS (OUTPATIENT)
Dept: HEALTH INFORMATION MANAGEMENT | Facility: CLINIC | Age: 66
End: 2021-09-08

## 2021-09-29 ENCOUNTER — TRANSFERRED RECORDS (OUTPATIENT)
Dept: HEALTH INFORMATION MANAGEMENT | Facility: CLINIC | Age: 66
End: 2021-09-29

## 2021-10-09 ENCOUNTER — HEALTH MAINTENANCE LETTER (OUTPATIENT)
Age: 66
End: 2021-10-09

## 2021-11-09 ENCOUNTER — IMMUNIZATION (OUTPATIENT)
Dept: NURSING | Facility: CLINIC | Age: 66
End: 2021-11-09
Payer: MEDICARE

## 2021-11-09 PROCEDURE — 0004A PR COVID VAC PFIZER DIL RECON 30 MCG/0.3 ML IM: CPT

## 2021-11-09 PROCEDURE — 91300 PR COVID VAC PFIZER DIL RECON 30 MCG/0.3 ML IM: CPT

## 2022-03-30 ENCOUNTER — TRANSFERRED RECORDS (OUTPATIENT)
Dept: HEALTH INFORMATION MANAGEMENT | Facility: CLINIC | Age: 67
End: 2022-03-30
Payer: MEDICARE

## 2022-05-18 ENCOUNTER — TRANSFERRED RECORDS (OUTPATIENT)
Dept: HEALTH INFORMATION MANAGEMENT | Facility: CLINIC | Age: 67
End: 2022-05-18
Payer: MEDICARE

## 2022-06-28 DIAGNOSIS — N52.9 ERECTILE DYSFUNCTION, UNSPECIFIED ERECTILE DYSFUNCTION TYPE: ICD-10-CM

## 2022-06-28 NOTE — TELEPHONE ENCOUNTER
Refill Request  Medication name: Pending Prescriptions:                       Disp   Refills    sildenafil (REVATIO) 20 MG tablet         30 tab*5          Who prescribed the medication: PCP  Last refill on medication: 3/10/21  Requested Pharmacy: [facility]  Last appointment with PCP: 5/25/21  Next appointment: Patient due for appointment

## 2022-06-29 NOTE — TELEPHONE ENCOUNTER
"Routing refill request to provider for review/approval because:  Patient needs to be seen because it has been more than 1 year since last office visit.    Last Written Prescription Date:  3/10/21  Last Fill Quantity: 30,  # refills: 5   Last office visit provider:  5/25/21     Requested Prescriptions   Pending Prescriptions Disp Refills     sildenafil (REVATIO) 20 MG tablet 30 tablet 5       Erectile Dysfuction Protocol Failed - 6/29/2022  7:45 AM        Failed - Recent (12 mo) or future (30 days) visit within the authorizing provider's specialty     Patient has had an office visit with the authorizing provider or a provider within the authorizing providers department within the previous 12 mos or has a future within next 30 days. See \"Patient Info\" tab in inbasket, or \"Choose Columns\" in Meds & Orders section of the refill encounter.              Passed - Absence of nitrates on medication list        Passed - Absence of Alpha Blockers on Med list        Passed - Medication is active on med list        Passed - Patient is age 18 or older             Luis Nunn RN 06/29/22 7:45 AM  "

## 2022-07-01 RX ORDER — SILDENAFIL CITRATE 20 MG/1
TABLET ORAL
Qty: 30 TABLET | Refills: 11 | Status: SHIPPED | OUTPATIENT
Start: 2022-07-01 | End: 2023-10-02

## 2022-07-16 ENCOUNTER — HEALTH MAINTENANCE LETTER (OUTPATIENT)
Age: 67
End: 2022-07-16

## 2022-08-02 DIAGNOSIS — D12.6 BENIGN NEOPLASM OF COLON, UNSPECIFIED PART OF COLON: ICD-10-CM

## 2022-08-02 DIAGNOSIS — C43.72 MALIGNANT MELANOMA OF LEFT LOWER LEG (H): ICD-10-CM

## 2022-08-02 DIAGNOSIS — I42.8 NON-ISCHEMIC CARDIOMYOPATHY (H): ICD-10-CM

## 2022-08-02 DIAGNOSIS — I10 ESSENTIAL HYPERTENSION: ICD-10-CM

## 2022-08-02 DIAGNOSIS — M10.9 GOUTY ARTHROPATHY: ICD-10-CM

## 2022-08-02 DIAGNOSIS — Z12.5 SCREENING FOR PROSTATE CANCER: ICD-10-CM

## 2022-08-02 DIAGNOSIS — E78.2 MIXED HYPERLIPIDEMIA: Primary | ICD-10-CM

## 2022-08-02 DIAGNOSIS — Z95.810 BIVENTRICULAR ICD (IMPLANTABLE CARDIOVERTER-DEFIBRILLATOR) IN PLACE: ICD-10-CM

## 2022-08-15 ENCOUNTER — LAB (OUTPATIENT)
Dept: LAB | Facility: CLINIC | Age: 67
End: 2022-08-15
Payer: MEDICARE

## 2022-08-15 DIAGNOSIS — R73.03 PREDIABETES: Primary | ICD-10-CM

## 2022-08-15 DIAGNOSIS — I10 ESSENTIAL HYPERTENSION: ICD-10-CM

## 2022-08-15 DIAGNOSIS — D12.6 BENIGN NEOPLASM OF COLON, UNSPECIFIED PART OF COLON: ICD-10-CM

## 2022-08-15 DIAGNOSIS — M10.9 GOUTY ARTHROPATHY: ICD-10-CM

## 2022-08-15 DIAGNOSIS — E78.2 MIXED HYPERLIPIDEMIA: ICD-10-CM

## 2022-08-15 DIAGNOSIS — Z00.00 ENCOUNTER FOR SUBSEQUENT ANNUAL WELLNESS VISIT IN MEDICARE PATIENT: ICD-10-CM

## 2022-08-15 DIAGNOSIS — Z12.5 SCREENING FOR PROSTATE CANCER: ICD-10-CM

## 2022-08-15 LAB
ALBUMIN SERPL BCG-MCNC: 4.5 G/DL (ref 3.5–5.2)
ALBUMIN UR-MCNC: NEGATIVE MG/DL
ALP SERPL-CCNC: 58 U/L (ref 40–129)
ALT SERPL W P-5'-P-CCNC: 29 U/L (ref 10–50)
ANION GAP SERPL CALCULATED.3IONS-SCNC: 13 MMOL/L (ref 7–15)
APPEARANCE UR: CLEAR
AST SERPL W P-5'-P-CCNC: 39 U/L (ref 10–50)
BILIRUB SERPL-MCNC: 0.6 MG/DL
BILIRUB UR QL STRIP: NEGATIVE
BUN SERPL-MCNC: 17.7 MG/DL (ref 8–23)
CALCIUM SERPL-MCNC: 9.9 MG/DL (ref 8.8–10.2)
CHLORIDE SERPL-SCNC: 102 MMOL/L (ref 98–107)
CHOLEST SERPL-MCNC: 205 MG/DL
COLOR UR AUTO: YELLOW
CREAT SERPL-MCNC: 1.26 MG/DL (ref 0.67–1.17)
DEPRECATED HCO3 PLAS-SCNC: 25 MMOL/L (ref 22–29)
ERYTHROCYTE [DISTWIDTH] IN BLOOD BY AUTOMATED COUNT: 18.3 % (ref 10–15)
GFR SERPL CREATININE-BSD FRML MDRD: 63 ML/MIN/1.73M2
GLUCOSE SERPL-MCNC: 111 MG/DL (ref 70–99)
GLUCOSE UR STRIP-MCNC: NEGATIVE MG/DL
HCT VFR BLD AUTO: 42.3 % (ref 40–53)
HDLC SERPL-MCNC: 39 MG/DL
HGB BLD-MCNC: 12.7 G/DL (ref 13.3–17.7)
HGB UR QL STRIP: NEGATIVE
KETONES UR STRIP-MCNC: NEGATIVE MG/DL
LDLC SERPL CALC-MCNC: 118 MG/DL
LEUKOCYTE ESTERASE UR QL STRIP: NEGATIVE
MCH RBC QN AUTO: 19.5 PG (ref 26.5–33)
MCHC RBC AUTO-ENTMCNC: 30 G/DL (ref 31.5–36.5)
MCV RBC AUTO: 65 FL (ref 78–100)
NITRATE UR QL: NEGATIVE
NONHDLC SERPL-MCNC: 166 MG/DL
PH UR STRIP: 5 [PH] (ref 5–8)
PLATELET # BLD AUTO: 162 10E3/UL (ref 150–450)
POTASSIUM SERPL-SCNC: 5.7 MMOL/L (ref 3.4–5.3)
PROT SERPL-MCNC: 7.1 G/DL (ref 6.4–8.3)
PSA SERPL-MCNC: 4.88 NG/ML (ref 0–4.5)
RBC # BLD AUTO: 6.51 10E6/UL (ref 4.4–5.9)
SODIUM SERPL-SCNC: 140 MMOL/L (ref 136–145)
SP GR UR STRIP: 1.01 (ref 1–1.03)
TRIGL SERPL-MCNC: 240 MG/DL
URATE SERPL-MCNC: 6.2 MG/DL (ref 3.4–7)
UROBILINOGEN UR STRIP-ACNC: 0.2 E.U./DL
WBC # BLD AUTO: 9 10E3/UL (ref 4–11)

## 2022-08-15 PROCEDURE — 36415 COLL VENOUS BLD VENIPUNCTURE: CPT

## 2022-08-15 PROCEDURE — 85027 COMPLETE CBC AUTOMATED: CPT

## 2022-08-15 PROCEDURE — 80061 LIPID PANEL: CPT

## 2022-08-15 PROCEDURE — 80053 COMPREHEN METABOLIC PANEL: CPT

## 2022-08-15 PROCEDURE — 83036 HEMOGLOBIN GLYCOSYLATED A1C: CPT

## 2022-08-15 PROCEDURE — 81003 URINALYSIS AUTO W/O SCOPE: CPT

## 2022-08-15 PROCEDURE — 84550 ASSAY OF BLOOD/URIC ACID: CPT

## 2022-08-15 PROCEDURE — G0103 PSA SCREENING: HCPCS

## 2022-08-16 ENCOUNTER — OFFICE VISIT (OUTPATIENT)
Dept: INTERNAL MEDICINE | Facility: CLINIC | Age: 67
End: 2022-08-16
Payer: MEDICARE

## 2022-08-16 VITALS
WEIGHT: 234.7 LBS | BODY MASS INDEX: 33.6 KG/M2 | DIASTOLIC BLOOD PRESSURE: 56 MMHG | HEART RATE: 68 BPM | HEIGHT: 70 IN | OXYGEN SATURATION: 96 % | SYSTOLIC BLOOD PRESSURE: 109 MMHG

## 2022-08-16 DIAGNOSIS — I42.8 NON-ISCHEMIC CARDIOMYOPATHY (H): ICD-10-CM

## 2022-08-16 DIAGNOSIS — C43.72 MALIGNANT MELANOMA OF LEFT LOWER LEG (H): ICD-10-CM

## 2022-08-16 DIAGNOSIS — Z95.810 BIVENTRICULAR ICD (IMPLANTABLE CARDIOVERTER-DEFIBRILLATOR) IN PLACE: ICD-10-CM

## 2022-08-16 DIAGNOSIS — Z23 NEED FOR PNEUMOCOCCAL VACCINATION: ICD-10-CM

## 2022-08-16 DIAGNOSIS — I10 ESSENTIAL HYPERTENSION: ICD-10-CM

## 2022-08-16 DIAGNOSIS — D56.8 OTHER THALASSEMIA (H): ICD-10-CM

## 2022-08-16 DIAGNOSIS — E66.01 SEVERE OBESITY WITH BODY MASS INDEX (BMI) OF 35.0 TO 35.9 AND COMORBIDITY (H): ICD-10-CM

## 2022-08-16 DIAGNOSIS — E78.2 MIXED HYPERLIPIDEMIA: ICD-10-CM

## 2022-08-16 DIAGNOSIS — D12.6 BENIGN NEOPLASM OF COLON, UNSPECIFIED PART OF COLON: ICD-10-CM

## 2022-08-16 DIAGNOSIS — Z11.59 NEED FOR HEPATITIS C SCREENING TEST: ICD-10-CM

## 2022-08-16 DIAGNOSIS — M48.062 SPINAL STENOSIS OF LUMBAR REGION WITH NEUROGENIC CLAUDICATION: ICD-10-CM

## 2022-08-16 DIAGNOSIS — Z00.00 MEDICARE ANNUAL WELLNESS VISIT, SUBSEQUENT: Primary | ICD-10-CM

## 2022-08-16 LAB — HBA1C MFR BLD: 5.3 %

## 2022-08-16 PROCEDURE — 90677 PCV20 VACCINE IM: CPT | Performed by: INTERNAL MEDICINE

## 2022-08-16 PROCEDURE — 99214 OFFICE O/P EST MOD 30 MIN: CPT | Mod: 25 | Performed by: INTERNAL MEDICINE

## 2022-08-16 PROCEDURE — 90471 IMMUNIZATION ADMIN: CPT | Performed by: INTERNAL MEDICINE

## 2022-08-16 PROCEDURE — G0439 PPPS, SUBSEQ VISIT: HCPCS | Performed by: INTERNAL MEDICINE

## 2022-08-16 ASSESSMENT — ENCOUNTER SYMPTOMS
CONSTIPATION: 0
WEAKNESS: 1
JOINT SWELLING: 0
FEVER: 0
MYALGIAS: 1
NAUSEA: 0
SHORTNESS OF BREATH: 0
ARTHRALGIAS: 1
DIZZINESS: 0
CHILLS: 0
PALPITATIONS: 0
DYSURIA: 0
FREQUENCY: 0
PARESTHESIAS: 0
DIARRHEA: 0
HEMATOCHEZIA: 0
EYE PAIN: 0
COUGH: 0
HEARTBURN: 0
ABDOMINAL PAIN: 0
SORE THROAT: 0
HEMATURIA: 0
HEADACHES: 0

## 2022-08-16 ASSESSMENT — ACTIVITIES OF DAILY LIVING (ADL): CURRENT_FUNCTION: NO ASSISTANCE NEEDED

## 2022-08-16 NOTE — PATIENT INSTRUCTIONS
Restart aspirin.    2.  Check if you have tried Zetia    3.  Low potassium diet.    4. See in one year or s needed    5.  Recheck potassium at next Cardiology appointment    6.  Next colonoscopy due in 2/2024

## 2022-08-16 NOTE — PROGRESS NOTES
SUBJECTIVE:   Cj Randhawa is a 67 year old male who presents for Preventive Visit.  Jhonny is .  He runs an [employer] company.  He enjoys fishing and generally takes several trips to the [place] to fish in the winter.  No cognitive deficits are noted.  Health maintenance issues were reviewed.  He is fairly sedentary, but otherwise has good health maintenance habits.    He has a history of nonischemic cardiomyopathy.  He does have a defibrillator in place.  He sees Dr. Cruzfor cardiac follow-up.  Ejection fraction was up to 45% by echocardiogram in 2020.  This compared to a value in the 20s several years earlier.  He denies orthopnea or PND, and feels his exercise tolerance is fair.    He has a history of a melanoma excised off the shin, and had a squamous cell cancer excised in 5/22.  He sees Dr. Chris Brandt generally every 6 months.    BMI currently is 33.9.  He has lost 30 pounds from his height.    He has a history of mixed hyperlipidemia.  He has stopped multiple statins due to myalgias.  Use of Zetia was discussed in the past.  He cannot recall if he tried it.  He is now on fenofibrate 48 mg daily with good tolerance.  He did have a CT cardiac calcium score of 148 in 2017.    He tested positive for COVID on a home test in the spring.  He reports symptoms were relatively mild.  He has had no long-term residual.    He has a past history of back pain with lumbar spinal stenosis noted.  He currently is doing well.    He has a previous diagnosis of essential hypertension.  Blood pressure now is excellent.    He has a history of microcytic anemia related to thalassemia.    He has a history of a melanoma excised off the ischium    Patient has been advised of split billing requirements and indicates understanding: Yes  Are you in the first 12 months of your Medicare coverage?  No    Healthy Habits:     In general, how would you rate your overall health?  Good    Frequency of exercise:  None    Do you  "usually eat at least 4 servings of fruit and vegetables a day, include whole grains    & fiber and avoid regularly eating high fat or \"junk\" foods?  Yes    Taking medications regularly:  Yes    Medication side effects:  Muscle aches and Lightheadedness    Ability to successfully perform activities of daily living:  No assistance needed    Home Safety:  No safety concerns identified    Hearing Impairment:  Difficulty following a conversation in a noisy restaurant or crowded room, need to ask people to speak up or repeat themselves and difficulty understanding soft or whispered speech    In the past 6 months, have you been bothered by leaking of urine?  No    In general, how would you rate your overall mental or emotional health?  Good      PHQ-2 Total Score: 0    Additional concerns today:  Yes    Do you feel safe in your environment? Yes    Have you ever done Advance Care Planning? (For example, a Health Directive, POLST, or a discussion with a medical provider or your loved ones about your wishes): No, advance care planning information given to patient to review.  Patient plans to discuss their wishes with loved ones or provider.        Fall risk  Fallen 2 or more times in the past year?: No  Any fall with injury in the past year?: No    Cognitive Screening   1) Repeat 3 items (Leader, Season, Table)    2) Clock draw: NORMAL  3) 3 item recall: Recalls 3 objects  Results: 3 items recalled: COGNITIVE IMPAIRMENT LESS LIKELY    Mini-CogTM Copyright S Desi. Licensed by the author for use in [facility]; reprinted with permission ([email or web address]). All rights reserved.      Do you have sleep apnea, excessive snoring or daytime drowsiness?: no    Reviewed and updated as needed this visit by clinical staff   Tobacco  Allergies  Meds                Reviewed and updated as needed this visit by Provider                   Social History     Tobacco Use     Smoking status: Former Smoker     Smokeless tobacco: " Never Used     Tobacco comment: 35 years ago   Substance Use Topics     Alcohol use: Yes     Alcohol/week: 12.0 standard drinks     If you drink alcohol do you typically have >3 drinks per day or >7 drinks per week? No    Alcohol Use 8/16/2022   Prescreen: >3 drinks/day or >7 drinks/week? No               Current providers sharing in care for this patient include:   Patient Care Team:  Jasper Sorensen MD as PCP - General (Internal Medicine)  Jasper Sorensen MD as Assigned PCP  Francis Cruz MD. Cardiology  Chris Brandt MD dermatology  The following health maintenance items are reviewed in Epic and correct as of today:  Health Maintenance Due   Topic Date Due     ADVANCE CARE PLANNING  Never done     Pneumococcal Vaccine: 65+ Years (1 - PCV) 8/16/2022     HEPATITIS C SCREENING  8/16/2022     LUNG CANCER SCREENING  Never done     AORTIC ANEURYSM SCREENING (SYSTEM ASSIGNED)  Never done     COVID-19 Vaccine (4 - Booster for Pfizer series) 03/09/2022     MEDICARE ANNUAL WELLNESS VISIT  05/25/2022               Review of Systems   Constitutional: Negative for chills and fever.   HENT: Negative for congestion, ear pain, hearing loss and sore throat.    Eyes: Negative for pain and visual disturbance.   Respiratory: Negative for cough and shortness of breath.    Cardiovascular: Negative for chest pain, palpitations and peripheral edema.   Gastrointestinal: Negative for abdominal pain, constipation, diarrhea, heartburn, hematochezia and nausea.   Genitourinary: Positive for impotence. Negative for dysuria, frequency, genital sores, hematuria, penile discharge and urgency.   Musculoskeletal: Positive for arthralgias and myalgias. Negative for joint swelling.   Skin: Negative for rash.   Neurological: Positive for weakness. Negative for dizziness, headaches and paresthesias.   Psychiatric/Behavioral: Positive for mood changes.         OBJECTIVE:   /56 (BP Location: Left arm, Patient Position: Sitting, Cuff Size:  "Adult Large)   Pulse 68   Ht 1.772 m (5' 9.76\")   Wt 106.5 kg (234 lb 11.2 oz)   SpO2 96%   BMI 33.90 kg/m   Estimated body mass index is 33.9 kg/m  as calculated from the following:    Height as of this encounter: 1.772 m (5' 9.76\").    Weight as of this encounter: 106.5 kg (234 lb 11.2 oz).  Physical Exam  /56 (BP Location: Left arm, Patient Position: Sitting, Cuff Size: Adult Large)   Pulse 68   Ht 1.772 m (5' 9.76\")   Wt 106.5 kg (234 lb 11.2 oz)   SpO2 96%   BMI 33.90 kg/m      General Appearance:  Alert, cooperative, no distress, appears stated age   Head:  Normocephalic, without obvious abnormality, atraumatic   Eyes:  PERRL, conjunctiva/corneas clear, EOM's intact   Ears:  Normal TM's and external ear canals, both ears   Nose: Nares normal, septum midline, mucosa normal, no drainage   Throat: Lips, mucosa, and tongue normal; teeth and gums normal   Neck: Supple, symmetrical, trachea midline, no adenopathy, thyroid: not enlarged, symmetric, no tenderness/mass/nodules, no carotid bruit or JVD   Back:   Symmetric, no curvature, ROM normal, no CVA tenderness   Lungs:   Clear to auscultation bilaterally, respirations unlabored   Chest Wall:   Defibrillator left chest   Heart:  Regular rate and rhythm, S1, S2 normal, no murmur, rub or gallop   Abdomen:   Soft, non-tender, bowel sounds active all four quadrants,  no masses, no organomegaly   Genitalia:   Circumcised.  No penile lesions or testicular masses   Rectal:  Normal tone, normal prostate, no masses or tenderness.  Prostate moderately enlarged but without suspicious nodules   Extremities: Extremities normal, atraumatic, no cyanosis or edema.  Peripheral pulses intact   Skin:  Numerous freckles and pigmented nevi.  Several old biopsy sites noted   Lymph nodes: Cervical and supraclavicular normal   Neurologic: No dysarthria or aphasia.  Cranial nerves, motor or sensory exams intact with symmetric DTRs         Diagnostic Test Results:  Labs " reviewed in Epic  No results found for any visits on 08/16/22.    ASSESSMENT / PLAN:   (Z00.00) Medicare annual wellness visit, subsequent  (primary encounter diagnosis)  Comment: Jhonny is .  He runs an asphalt business.  No cognitive deficits are noted.  He is fairly sedentary, but otherwise has good health maintenance habits.  Previsit labs were reviewed.  Plan: Health maintenance and screening issues were discussed.  Appropriate vaccinations reviewed    (M48.062) Spinal stenosis of lumbar region with neurogenic claudication  Comment: No current low back issues      (D12.6) Benign neoplasm of colon, unspecified part of colon  Comment: On a 5-year follow-up  Plan:     (E78.2) Mixed hyperlipidemia  Comment: Unfortunately, he has not tolerated multiple statins in the past due to muscle aches.  He is now on low-dose fenofibrate.  He does not recall if he has used Zetia in the past  Plan: Consider Zetia in the future    (I10) Essential hypertension  Comment: Good control on current regimen  Plan: Continue metoprolol and lisinopril.  He does have a tendency toward hyperkalemia which undoubtedly is aggravated by lisinopril.  Previsit potassium was 5.7.  He was given information on lower potassium diet    (I42.8) Non-ischemic cardiomyopathy (H)  Comment: Currently well compensated.  Last ejection fraction 45%  Plan: He sees Dr. Cruz regularly    (C43.72) Malignant melanoma of left lower leg (H)  Comment: He has regular dermatology follow-up with Dr. Brandt      (D56.8) Thalassemia Anemia  Comment: Hemoglobin is at its usual baseline      (Z11.59) Need for hepatitis C screening test  Comment: Screening for hepatitis C is advised per recent health maintenance recommendations  Plan: Hepatitis C Screen Reflex to HCV RNA Quant and         Genotype            (Z23) Need for pneumococcal vaccination  Comment: Prevnar 20 is advised  Plan: Pneumococcal 20 Valent Conjugate (PCV20)           (E66.01,  Z68.35) Severe obesity  "with body mass index (BMI) of 35.0 to 35.9 and comorbidity (H)  Comment: He has had an intentional weight loss of 30 pounds and was commended for this.  BMI is still somewhat above goal at 33.9  Plan: Continue efforts at weight loss and regular exercise    (Z95.810) Biventricular ICD (implantable cardioverter-defibrillator) in place  Comment: Sees Dr. Cruz    Patient Instructions   1.  Restart aspirin.    2.  Check if you have tried Zetia    3.  Low potassium diet.    4. See in one year or s needed    5.  Recheck potassium at next Cardiology appointment    6.  Next colonoscopy due in 2/2024        COUNSELING:  Reviewed preventive health counseling, as reflected in patient instructions       Regular exercise       Healthy diet/nutrition       Aspirin prophylaxis        Colon cancer screening       Prostate cancer screening       Appropriate vaccinations were reviewed    Estimated body mass index is 33.9 kg/m  as calculated from the following:    Height as of this encounter: 1.772 m (5' 9.76\").    Weight as of this encounter: 106.5 kg (234 lb 11.2 oz).    Weight management plan: Discussed healthy diet and exercise guidelines    He reports that he has quit smoking. He has never used smokeless tobacco.      Appropriate preventive services were discussed with this patient, including applicable screening as appropriate for cardiovascular disease, diabetes, osteopenia/osteoporosis, and glaucoma.  As appropriate for age/gender, discussed screening for colorectal cancer, prostate cancer, breast cancer, and cervical cancer. Checklist reviewing preventive services available has been given to the patient.    Reviewed patients plan of care and provided an AVS. The Basic Care Plan (routine screening as documented in Health Maintenance) for Cj meets the Care Plan requirement. This Care Plan has been established and reviewed with the Patient.    Counseling Resources:  ATP IV Guidelines  Pooled Cohorts Equation " Calculator  Breast Cancer Risk Calculator  Breast Cancer: Medication to Reduce Risk  FRAX Risk Assessment  ICSI Preventive Guidelines  Dietary Guidelines for Americans, 2010  [employer]'s MyPlate  ASA Prophylaxis  Lung CA Screening    Jasper Sorensen MD  [facility]    Identified Health Risks:

## 2022-09-12 ENCOUNTER — HOSPITAL ENCOUNTER (OUTPATIENT)
Dept: GENERAL RADIOLOGY | Facility: HOSPITAL | Age: 67
Discharge: HOME | End: 2022-09-12
Attending: INTERNAL MEDICINE | Admitting: INTERNAL MEDICINE
Payer: MEDICARE

## 2022-09-12 ENCOUNTER — NURSE TRIAGE (OUTPATIENT)
Dept: NURSING | Facility: CLINIC | Age: 67
End: 2022-09-12

## 2022-09-12 ENCOUNTER — OFFICE VISIT (OUTPATIENT)
Dept: INTERNAL MEDICINE | Facility: CLINIC | Age: 67
End: 2022-09-12
Payer: MEDICARE

## 2022-09-12 VITALS
SYSTOLIC BLOOD PRESSURE: 118 MMHG | BODY MASS INDEX: 33.5 KG/M2 | HEIGHT: 70 IN | TEMPERATURE: 98.5 F | HEART RATE: 95 BPM | DIASTOLIC BLOOD PRESSURE: 66 MMHG | OXYGEN SATURATION: 97 % | WEIGHT: 234 LBS

## 2022-09-12 DIAGNOSIS — R09.81 NASAL CONGESTION: ICD-10-CM

## 2022-09-12 DIAGNOSIS — R05.9 COUGH: ICD-10-CM

## 2022-09-12 DIAGNOSIS — R05.9 COUGH: Primary | ICD-10-CM

## 2022-09-12 DIAGNOSIS — R06.2 WHEEZING: ICD-10-CM

## 2022-09-12 PROBLEM — I50.30 DIASTOLIC HEART FAILURE (H): Status: ACTIVE | Noted: 2018-07-02

## 2022-09-12 PROCEDURE — 71046 X-RAY EXAM CHEST 2 VIEWS: CPT | Mod: FY

## 2022-09-12 PROCEDURE — 99214 OFFICE O/P EST MOD 30 MIN: CPT | Performed by: INTERNAL MEDICINE

## 2022-09-12 RX ORDER — LISINOPRIL 20 MG/1
20 TABLET ORAL DAILY
COMMUNITY
Start: 2022-02-08

## 2022-09-12 RX ORDER — PREDNISONE 20 MG/1
40 TABLET ORAL DAILY
Qty: 10 TABLET | Refills: 0 | Status: SHIPPED | OUTPATIENT
Start: 2022-09-12 | End: 2024-04-19

## 2022-09-12 RX ORDER — METOPROLOL SUCCINATE 25 MG/1
TABLET, EXTENDED RELEASE ORAL
COMMUNITY
Start: 2022-05-26 | End: 2024-04-19

## 2022-09-12 RX ORDER — FENOFIBRATE 48 MG/1
48 TABLET, COATED ORAL DAILY
COMMUNITY
Start: 2021-10-07 | End: 2023-08-15

## 2022-09-12 RX ORDER — AZITHROMYCIN 250 MG/1
TABLET, FILM COATED ORAL
Qty: 6 TABLET | Refills: 0 | Status: SHIPPED | OUTPATIENT
Start: 2022-09-12 | End: 2022-09-17

## 2022-09-12 ASSESSMENT — PAIN SCALES - GENERAL: PAINLEVEL: NO PAIN (0)

## 2022-09-12 NOTE — PROGRESS NOTES
[facility]    Comprehensive and complex medical care - Chronic disease management - Shared decision making - Care coordination - Compassionate care    Patient advocacy - Rational deprescribing - Minimally disruptive medicine - Ethical focus - Customized care         Date of Service: 9/12/2022  Primary Provider: Jasper Sorensen    Patient Care Team:  Jasper Sorensen MD as PCP - General (Internal Medicine)  Jasper Sorensen MD as Assigned PCP  Chris Brandt MD as MD (Dermatology)  Francis Cruz MD as MD (Cardiovascular Disease)          Patient's Pharmacy:    [id number] [place] [facility] - [place], [place] - [address] [place] [id number]  Phone: [phone] Fax: [phone]    [facility] - [place], [place] - [address]  [address] [facility] [address] [facility] [address]  Phone: [phone] Fax: [phone]     Patient's Contacts:  Name Home Phone Work Phone Mobile Phone Relationship Lgl Grd   SILVINO INGRAM [phone] Spouse    PABLITO INGRAM [phone]   Other      Patient's Insurance:    Payor: MEDICARE / Plan: MEDICARE / Product Type: Medicare /           Active Problem List:  Problem List as of 9/12/2022 Reviewed: 9/12/2022  4:22 PM by Paramjit Boyle MD       Medium    Cervical Spine Stenosis From C ___    Mixed hyperlipidemia    Cholelithiasis    Distal Ureteral Stone On The Left    Male Erectile Disorder    Thalassemia Anemia    Gout    Essential hypertension    Benign Tubular Adenoma Of The Large Intestine    Dermatitis Due To Contact With Poison Fanta    Severe obesity with body mass index (BMI) of 35.0 to 35.9 and comorbidity (H)    Biventricular ICD (implantable cardioverter-defibrillator) in place    Non-ischemic cardiomyopathy (H)    Malignant melanoma of left lower leg (H)    Spinal stenosis of lumbar region with neurogenic claudication    Diastolic heart failure (H)           Current Outpatient  Medications   Medication Instructions     allopurinol (ZYLOPRIM) 300 MG tablet TAKE 1 TABLET BY MOUTH EVERY DAY     azithromycin (ZITHROMAX) 250 MG tablet Take 2 tablets (500 mg) by mouth daily for 1 day, THEN 1 tablet (250 mg) daily for 4 days.     fenofibrate (TRICOR) 48 mg, DAILY     furosemide (LASIX) 20 mg, DAILY     hydrocortisone 1 % ointment PRN     lisinopril (ZESTRIL) 20 mg, Oral, DAILY     metoprolol succinate ER (TOPROL XL) 25 MG 24 hr tablet TAKE 1 TABLET BY MOUTH IN THE MORNING AND 2 TABLETS IN THE EVENING     naproxen sodium (ANAPROX) 220 mg, 2 TIMES DAILY WITH MEALS     predniSONE (DELTASONE) 40 mg, Oral, DAILY     sildenafil (REVATIO) 20 MG tablet [SILDENAFIL (REVATIO) 20 MG TABLET] Two to four tabs as needed for erectile dysfunction     Social History     Social History Narrative    He works for [employer] as a manager.  He is  and has 3 children and 3 grandchildren.       Subjective:     Cj Randhawa is a 67 year old male who comes in today for:    Chief Complaint   Patient presents with     Cough     Congestion      Patient comes in today for severe cough.    His son had a cough that lasted 3 to 4 weeks and he has been around him.    He had sore throat at the initial part of the illness but not anymore.  Coughing has been his main symptom.  He was initially coughing up clear fluid and this lasted about 2 weeks, then he got a bit better, but then has become sick again.  He has had 3 COVID tests done in the last 2 weeks and they have all been negative.  He is starting to cough up yellow from his chest.  This is more in the last few days.  He denies any facial pressure but does have some congestion in his nose but this is not severe.  He notes a lot of wheezing when laying down at night.  He has not had no history of inhaler use.  He does not have too significant of shortness of breath compared to his baseline.    He denies any fevers or chills.    He chronically has  "dizziness.  This is in part due to his medications for his cardiomyopathy.  He has been noticing severe dizziness with excessive coughing.  No syncopal episodes at this time but has gotten close.    We reviewed his other issues noted in the assessment but not specifically addressed in the HPI above.     Objective:     Wt Readings from Last 3 Encounters:   09/12/22 106.1 kg (234 lb)   08/16/22 106.5 kg (234 lb 11.2 oz)   05/25/21 115.2 kg (254 lb)     BP Readings from Last 3 Encounters:   09/12/22 118/66   08/16/22 109/56   05/25/21 116/64     /66 (BP Location: Right arm, Patient Position: Left side, Cuff Size: Infant)   Pulse 95   Temp 98.5  F (36.9  C) (Temporal)   Ht 1.772 m (5' 9.76\")   Wt 106.1 kg (234 lb)   SpO2 97%   BMI 33.81 kg/m     The patient is comfortable, no acute distress.  Mood good.  Insight good.  Eyes are nonicteric.  His nose reveals 3-4+ rhinitis which is bluish in color.  His throat is clear.  His ears are clear.  Neck is supple without mass.  No cervical adenopathy.  No thyromegaly. Heart regular rate and rhythm.  Lungs show some expiratory wheezes on auscultation.  Respiratory effort is good.  Abdomen soft and nontender.  No hepatosplenomegaly.  Extremities no edema.      Diagnostics:     Lab on 08/15/2022   Component Date Value Ref Range Status     Prostate Specific Antigen Screen 08/15/2022 4.88 (A) 0.00 - 4.50 ng/mL Final     Sodium 08/15/2022 140  136 - 145 mmol/L Final     Potassium 08/15/2022 5.7 (A) 3.4 - 5.3 mmol/L Final     Creatinine 08/15/2022 1.26 (A) 0.67 - 1.17 mg/dL Final     Urea Nitrogen 08/15/2022 17.7  8.0 - 23.0 mg/dL Final     Chloride 08/15/2022 102  98 - 107 mmol/L Final     Carbon Dioxide (CO2) 08/15/2022 25  22 - 29 mmol/L Final     Anion Gap 08/15/2022 13  7 - 15 mmol/L Final     Glucose 08/15/2022 111 (A) 70 - 99 mg/dL Final     Calcium 08/15/2022 9.9  8.8 - 10.2 mg/dL Final     Protein Total 08/15/2022 7.1  6.4 - 8.3 g/dL Final     Albumin 08/15/2022 " 4.5  3.5 - 5.2 g/dL Final     Bilirubin Total 08/15/2022 0.6  <=1.2 mg/dL Final     Alkaline Phosphatase 08/15/2022 58  40 - 129 U/L Final     AST 08/15/2022 39  10 - 50 U/L Final     ALT 08/15/2022 29  10 - 50 U/L Final     GFR Estimate 08/15/2022 63  >60 mL/min/1.73m2 Final    Effective December 21, 2021 eGFRcr in adults is calculated using the 2021 CKD-EPI creatinine equation which includes age and gender (Bennie et al., NEJ, DOI: 10.1056/DTAGmh7863421)     WBC Count 08/15/2022 9.0  4.0 - 11.0 10e3/uL Final     RBC Count 08/15/2022 6.51 (A) 4.40 - 5.90 10e6/uL Final     Hemoglobin 08/15/2022 12.7 (A) 13.3 - 17.7 g/dL Final     Hematocrit 08/15/2022 42.3  40.0 - 53.0 % Final     MCV 08/15/2022 65 (A) 78 - 100 fL Final     MCH 08/15/2022 19.5 (A) 26.5 - 33.0 pg Final     MCHC 08/15/2022 30.0 (A) 31.5 - 36.5 g/dL Final     RDW 08/15/2022 18.3 (A) 10.0 - 15.0 % Final     Platelet Count 08/15/2022 162  150 - 450 10e3/uL Final     Cholesterol 08/15/2022 205 (A) <200 mg/dL Final     Triglycerides 08/15/2022 240 (A) <150 mg/dL Final     Direct Measure HDL 08/15/2022 39 (A) >=40 mg/dL Final     LDL Cholesterol Calculated 08/15/2022 118 (A) <=100 mg/dL Final     Non HDL Cholesterol 08/15/2022 166 (A) <130 mg/dL Final     Color Urine 08/15/2022 Yellow  Colorless, Straw, Light Yellow, Yellow Final     Appearance Urine 08/15/2022 Clear  Clear Final     Glucose Urine 08/15/2022 Negative  Negative mg/dL Final     Bilirubin Urine 08/15/2022 Negative  Negative Final     Ketones Urine 08/15/2022 Negative  Negative mg/dL Final     Specific Gravity Urine 08/15/2022 1.015  1.005 - 1.030 Final     Blood Urine 08/15/2022 Negative  Negative Final     pH Urine 08/15/2022 5.0  5.0 - 8.0 Final     Protein Albumin Urine 08/15/2022 Negative  Negative mg/dL Final     Urobilinogen Urine 08/15/2022 0.2  0.2, 1.0 E.U./dL Final     Nitrite Urine 08/15/2022 Negative  Negative Final     Leukocyte Esterase Urine 08/15/2022 Negative  Negative Final      Uric Acid 08/15/2022 6.2  3.4 - 7.0 mg/dL Final     Hemoglobin A1C 08/15/2022 5.3  <=5.6 % Final      Prediabetes: 5.7 to 6.4%        Diabetes:  >=6.5%     Patients with Hgb F >5%, total bilirubin >10.0 mg/dL, abnormal red cell turnover, severe renal or hepatic disease or malignancy should not have this A1C method used to diagnose or monitor diabetes.        Results for orders placed or performed during the hospital encounter of 09/12/22   XR Chest 2 Views     Status: None    Narrative    EXAM: XR CHEST 2 VIEWS  LOCATION: [facility]  DATE/TIME: 9/12/2022 1:46 PM    INDICATION:  Cough  COMPARISON: None.      Impression    IMPRESSION: The heart is normal in size. A left-sided AICD is identified. The lungs are clear. No infiltrate, pleural effusion, or pneumothorax is identified.       Assessment:     1. Cough    2. Wheezing    3. Nasal congestion        Plan:     1. Chest x-ray done today.  2. Likely infectious but there are some allergic type signs seen on exam as well today.  3. Will treat with Z-Geovanni and prednisone 5-day course.  4. Follow-up with primary clinic if not improving.  5. Continue current medications otherwise.  6. Follow up sooner if issues.    Orders Placed This Encounter   Procedures     XR Chest 2 Views           Paramjit Boyle MD  General Internal Medicine  [facility]    Return in about 1 year (around 9/12/2023), or if symptoms worsen or fail to improve, for annual wellness visit, follow up with your primary care provider.     No future appointments.

## 2022-09-12 NOTE — TELEPHONE ENCOUNTER
Covid screening completed    Has had cough x 11 days to extreme that he blacked out at times.  Not very productive.  Some color recently.    Taking mucinex helps some.    Needs to be seen in next 24 hours or UC.    Care advice given per protocol recommendations. Caller verbalizes understanding    Warm transferred to scheduling    Leslie Pablo RN  [facility] Nurse Advisor  Reason for Disposition    SEVERE coughing spells (e.g., whooping sound after coughing, vomiting after coughing)    Additional Information    Negative: SEVERE difficulty breathing (e.g., struggling for each breath, speaks in single words)    Negative: Bluish (or gray) lips or face now    Negative: [1] Difficulty breathing AND [2] exposure to flames, smoke, or fumes    Negative: [1] Stridor AND [2] difficulty breathing    Negative: Sounds like a life-threatening emergency to the triager    Negative: [1] MODERATE difficulty breathing (e.g., speaks in phrases, SOB even at rest, pulse 100-120) AND [2] still present when not coughing    Negative: Chest pain  (Exception: MILD central chest pain, present only when coughing)    Negative: [1] MILD difficulty breathing (e.g., minimal/no SOB at rest, SOB with walking, pulse <100) AND [2] still present when not coughing    Negative: [1] Coughed up blood AND [2] > 1 tablespoon (15 ml)  (Exception: Blood-tinged sputum.)    Negative: Fever > 103 F (39.4 C)    Negative: [1] Fever > 101 F (38.3 C) AND [2] age > 60 years    Negative: [1] Fever > 100.0 F (37.8 C) AND [2] bedridden (e.g., nursing home patient, CVA, chronic illness, recovering from surgery)    Negative: [1] Fever > 100.0 F (37.8 C) AND [2] diabetes mellitus or weak immune system (e.g., HIV positive, cancer chemo, splenectomy, organ transplant, chronic steroids)    Negative: Wheezing is present    Protocols used: COUGH - ACUTE [id number]

## 2022-09-17 ENCOUNTER — HEALTH MAINTENANCE LETTER (OUTPATIENT)
Age: 67
End: 2022-09-17

## 2023-05-31 ENCOUNTER — TRANSFERRED RECORDS (OUTPATIENT)
Dept: HEALTH INFORMATION MANAGEMENT | Facility: CLINIC | Age: 68
End: 2023-05-31
Payer: MEDICARE

## 2023-08-15 ENCOUNTER — LAB (OUTPATIENT)
Dept: LAB | Facility: CLINIC | Age: 68
End: 2023-08-15
Payer: MEDICARE

## 2023-08-15 DIAGNOSIS — D56.8 OTHER THALASSEMIA (H): ICD-10-CM

## 2023-08-15 DIAGNOSIS — Z12.5 SCREENING FOR PROSTATE CANCER: ICD-10-CM

## 2023-08-15 DIAGNOSIS — M10.9 GOUTY ARTHROPATHY: ICD-10-CM

## 2023-08-15 DIAGNOSIS — Z12.31 VISIT FOR SCREENING MAMMOGRAM: Primary | ICD-10-CM

## 2023-08-15 DIAGNOSIS — I42.8 NON-ISCHEMIC CARDIOMYOPATHY (H): ICD-10-CM

## 2023-08-15 DIAGNOSIS — I10 ESSENTIAL HYPERTENSION: ICD-10-CM

## 2023-08-15 LAB
ALBUMIN SERPL BCG-MCNC: 4.2 G/DL (ref 3.5–5.2)
ALP SERPL-CCNC: 53 U/L (ref 40–129)
ALT SERPL W P-5'-P-CCNC: 31 U/L (ref 0–70)
ANION GAP SERPL CALCULATED.3IONS-SCNC: 7 MMOL/L (ref 7–15)
AST SERPL W P-5'-P-CCNC: 35 U/L (ref 0–45)
BILIRUB DIRECT SERPL-MCNC: <0.2 MG/DL (ref 0–0.3)
BILIRUB SERPL-MCNC: 0.5 MG/DL
BUN SERPL-MCNC: 12.9 MG/DL (ref 8–23)
CALCIUM SERPL-MCNC: 9.4 MG/DL (ref 8.8–10.2)
CHLORIDE SERPL-SCNC: 105 MMOL/L (ref 98–107)
CREAT SERPL-MCNC: 1.02 MG/DL (ref 0.67–1.17)
DEPRECATED HCO3 PLAS-SCNC: 27 MMOL/L (ref 22–29)
ERYTHROCYTE [DISTWIDTH] IN BLOOD BY AUTOMATED COUNT: 17.7 % (ref 10–15)
GFR SERPL CREATININE-BSD FRML MDRD: 81 ML/MIN/1.73M2
GLUCOSE SERPL-MCNC: 112 MG/DL (ref 70–99)
HCT VFR BLD AUTO: 39.5 % (ref 40–53)
HGB BLD-MCNC: 12.2 G/DL (ref 13.3–17.7)
MCH RBC QN AUTO: 20 PG (ref 26.5–33)
MCHC RBC AUTO-ENTMCNC: 30.9 G/DL (ref 31.5–36.5)
MCV RBC AUTO: 65 FL (ref 78–100)
PLATELET # BLD AUTO: 150 10E3/UL (ref 150–450)
POTASSIUM SERPL-SCNC: 5.3 MMOL/L (ref 3.4–5.3)
PROT SERPL-MCNC: 6.5 G/DL (ref 6.4–8.3)
PSA SERPL DL<=0.01 NG/ML-MCNC: 5.76 NG/ML (ref 0–4.5)
RBC # BLD AUTO: 6.09 10E6/UL (ref 4.4–5.9)
SODIUM SERPL-SCNC: 139 MMOL/L (ref 136–145)
URATE SERPL-MCNC: 5.8 MG/DL (ref 3.4–7)
WBC # BLD AUTO: 8 10E3/UL (ref 4–11)

## 2023-08-15 PROCEDURE — 84550 ASSAY OF BLOOD/URIC ACID: CPT

## 2023-08-15 PROCEDURE — 85027 COMPLETE CBC AUTOMATED: CPT

## 2023-08-15 PROCEDURE — G0103 PSA SCREENING: HCPCS

## 2023-08-15 PROCEDURE — 82248 BILIRUBIN DIRECT: CPT

## 2023-08-15 PROCEDURE — 80053 COMPREHEN METABOLIC PANEL: CPT

## 2023-08-15 PROCEDURE — 36415 COLL VENOUS BLD VENIPUNCTURE: CPT

## 2023-08-24 ENCOUNTER — OFFICE VISIT (OUTPATIENT)
Dept: INTERNAL MEDICINE | Facility: CLINIC | Age: 68
End: 2023-08-24
Payer: MEDICARE

## 2023-08-24 VITALS
OXYGEN SATURATION: 98 % | SYSTOLIC BLOOD PRESSURE: 138 MMHG | HEART RATE: 58 BPM | HEIGHT: 70 IN | WEIGHT: 246.5 LBS | BODY MASS INDEX: 35.29 KG/M2 | DIASTOLIC BLOOD PRESSURE: 80 MMHG

## 2023-08-24 DIAGNOSIS — I42.8 NON-ISCHEMIC CARDIOMYOPATHY (H): ICD-10-CM

## 2023-08-24 DIAGNOSIS — D12.6 TUBULAR ADENOMA OF COLON: ICD-10-CM

## 2023-08-24 DIAGNOSIS — M48.02 SPINAL STENOSIS IN CERVICAL REGION: ICD-10-CM

## 2023-08-24 DIAGNOSIS — M18.0 ARTHRITIS OF CARPOMETACARPAL (CMC) JOINT OF BOTH THUMBS: ICD-10-CM

## 2023-08-24 DIAGNOSIS — I47.10 SVT (SUPRAVENTRICULAR TACHYCARDIA) (H): ICD-10-CM

## 2023-08-24 DIAGNOSIS — R97.20 ELEVATED PROSTATE SPECIFIC ANTIGEN (PSA): ICD-10-CM

## 2023-08-24 DIAGNOSIS — Z00.00 ENCOUNTER FOR MEDICARE ANNUAL WELLNESS EXAM: Primary | ICD-10-CM

## 2023-08-24 DIAGNOSIS — C43.72 MALIGNANT MELANOMA OF LEFT LOWER LEG (H): ICD-10-CM

## 2023-08-24 DIAGNOSIS — N20.1 CALCULUS OF URETER: ICD-10-CM

## 2023-08-24 DIAGNOSIS — M10.9 GOUTY ARTHROPATHY: ICD-10-CM

## 2023-08-24 DIAGNOSIS — D56.3 ALPHA THALASSEMIA TRAIT: ICD-10-CM

## 2023-08-24 DIAGNOSIS — E66.01 MORBID OBESITY (H): ICD-10-CM

## 2023-08-24 DIAGNOSIS — Z87.891 FORMER SMOKER: ICD-10-CM

## 2023-08-24 DIAGNOSIS — I10 PRIMARY HYPERTENSION: ICD-10-CM

## 2023-08-24 DIAGNOSIS — Z95.810 BIVENTRICULAR ICD (IMPLANTABLE CARDIOVERTER-DEFIBRILLATOR) IN PLACE: Chronic | ICD-10-CM

## 2023-08-24 DIAGNOSIS — E78.2 MIXED HYPERLIPIDEMIA: ICD-10-CM

## 2023-08-24 PROBLEM — N52.9 ED (ERECTILE DYSFUNCTION): Status: ACTIVE | Noted: 2023-08-24

## 2023-08-24 PROBLEM — I50.30 DIASTOLIC HEART FAILURE (H): Status: RESOLVED | Noted: 2018-07-02 | Resolved: 2023-08-24

## 2023-08-24 PROCEDURE — 99214 OFFICE O/P EST MOD 30 MIN: CPT | Mod: 25 | Performed by: INTERNAL MEDICINE

## 2023-08-24 PROCEDURE — G0439 PPPS, SUBSEQ VISIT: HCPCS | Performed by: INTERNAL MEDICINE

## 2023-08-24 ASSESSMENT — ENCOUNTER SYMPTOMS
HEMATOCHEZIA: 0
NAUSEA: 0
ABDOMINAL PAIN: 0
JOINT SWELLING: 1
EYE PAIN: 0
MYALGIAS: 0
DIARRHEA: 0
COUGH: 0
HEARTBURN: 1
SORE THROAT: 0
FREQUENCY: 0
HEMATURIA: 0
PARESTHESIAS: 0
WEAKNESS: 1
CONSTIPATION: 0
CHILLS: 0
DYSURIA: 0
FEVER: 0
NERVOUS/ANXIOUS: 0
ARTHRALGIAS: 1
PALPITATIONS: 0
DIZZINESS: 0
HEADACHES: 0
SHORTNESS OF BREATH: 0

## 2023-08-24 ASSESSMENT — PAIN SCALES - GENERAL: PAINLEVEL: NO PAIN (0)

## 2023-08-24 ASSESSMENT — ACTIVITIES OF DAILY LIVING (ADL): CURRENT_FUNCTION: NO ASSISTANCE NEEDED

## 2023-08-24 NOTE — PATIENT INSTRUCTIONS
Patient Education   Personalized Prevention Plan  You are due for the preventive services outlined below.  Your care team is available to assist you in scheduling these services.  If you have already completed any of these items, please share that information with your care team to update in your medical record.  Health Maintenance Due   Topic Date Due    AORTIC ANEURYSM SCREENING (SYSTEM ASSIGNED)  Never done    ANNUAL REVIEW OF HM ORDERS  08/16/2023    Colorectal Cancer Screening  09/10/2023     Hearing Loss: Care Instructions  Overview     Hearing loss is a sudden or slow decrease in how well you hear. It can range from slight to profound. Permanent hearing loss can occur with aging. It also can happen when you are exposed long-term to loud noise. Examples include listening to loud music, riding motorcycles, or being around other loud machines.  Hearing loss can affect your work and home life. It can make you feel lonely or depressed. You may feel that you have lost your independence. But hearing aids and other devices can help you hear better and feel connected to others.  Follow-up care is a key part of your treatment and safety. Be sure to make and go to all appointments, and call your doctor if you are having problems. It's also a good idea to know your test results and keep a list of the medicines you take.  How can you care for yourself at home?  Avoid loud noises whenever possible. This helps keep your hearing from getting worse.  Always wear hearing protection around loud noises.  Wear a hearing aid as directed.  A professional can help you pick a hearing aid that will work best for you.  You can also get hearing aids over the counter for mild to moderate hearing loss.  Have hearing tests as your doctor suggests. They can show whether your hearing has changed. Your hearing aid may need to be adjusted.  Use other devices as needed. These may include:  Telephone amplifiers and hearing aids that can connect  "to a television, stereo, radio, or microphone.  Devices that use lights or vibrations. These alert you to the doorbell, a ringing telephone, or a baby monitor.  Television closed-captioning. This shows the words at the bottom of the screen. Most new TVs can do this.  TTY (text telephone). This lets you type messages back and forth on the telephone instead of talking or listening. These devices are also called TDD. When messages are typed on the keyboard, they are sent over the phone line to a receiving TTY. The message is shown on a monitor.  Use text messaging, social media, and email if it is hard for you to communicate by telephone.  Try to learn a listening technique called speechreading. It is not lipreading. You pay attention to people's gestures, expressions, posture, and tone of voice. These clues can help you understand what a person is saying. Face the person you are talking to, and have them face you. Make sure the lighting is good. You need to see the other person's face clearly.  Think about counseling if you need help to adjust to your hearing loss.  When should you call for help?  Watch closely for changes in your health, and be sure to contact your doctor if:    You think your hearing is getting worse.     You have new symptoms, such as dizziness or nausea.   Where can you learn more?  Go to https://www.xLander.ru.net/patiented  Enter R798 in the search box to learn more about \"Hearing Loss: Care Instructions.\"  Current as of: March 1, 2023               Content Version: 13.7    9506-5038 [employer].   Care instructions adapted under license by your healthcare professional. If you have questions about a medical condition or this instruction, always ask your healthcare professional. [employer] disclaims any warranty or liability for your use of this information.         "

## 2023-08-24 NOTE — PROGRESS NOTES
[facility] - Primary [facility] Specialists    Comprehensive and complex medical care - Chronic disease management - Shared decision making - Care coordination - Compassionate care    Patient advocacy - Rational deprescribing - Minimally disruptive medicine - Ethical focus - Customized care         Date of Service: 8/24/2023  Primary Provider: Paramjit Boyle    Patient Care Team:  Paramjit Boyle MD as PCP - General (Internal Medicine)  Chris Brandt MD as MD (Dermatology)  Francis Cruz MD as MD (Cardiovascular Disease)  Paramjit Boyle MD as Assigned PCP          Patient's Pharmacy:    [facility] [id number] [place] [facility] - [place], [place] - [address] [place] [id number]  Phone: [phone] Fax: [phone]    [facility] - [place], [place] - [address] [facility]  [address] [facility] [address]  Phone: [phone] Fax: [phone]     Patient's Contacts:  Name Home Phone Work Phone Mobile Phone Relationship Lgl Grd   SILVINO RANDHAWA [phone] Spouse    PABLITO RANDHAWA [phone]   Other      Patient's Insurance:    Payor: MEDICARE / Plan: MEDICARE / Product Type: Medicare /      Subjective:     History of present illness:    Cj Randhawa is an 68 year old here for an annual wellness visit.    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    [place]    Doing well overall at this time.    Follow up with cardiology reviewed and doing well in relationship to his heart failure.  Last echocardiogram reviewed.    Has some osteoarthritis (OA) of the thumb carpometacarpal (CMC) joints that bother him.    No shortness of breath.    Blood pressure doing well.    Reviewed prostate specific antigen (PSA) and up  some.  Was 5.8 when free prostate specific antigen (PSA) checked 2-3 years ago.  Has some obstructive prostate symptoms.  Discussed the risk of cancer still.  Discussed urology consult and MRI scan.  Prefers watchful waiting at this time.    Reviewed cholesterol and patient would like it checked.     Complete blood count (CBC) is stable at this time.    We reviewed his other issues noted in the assessment but not specifically addressed in the HPI above.           Active Problem List:  Problem List as of 8/24/2023 Reviewed: 8/24/2023 10:05 AM by Paramjit Boyle MD         Medium    Cervical Spine Stenosis From C ___    Mixed hyperlipidemia    Cholelithiasis    Distal Ureteral Stone On The Left    Male Erectile Disorder    Thalassemia Anemia    Gout    Essential hypertension    Benign Tubular Adenoma Of The Large Intestine    Dermatitis Due To Contact With Poison Fanta    Severe obesity with body mass index (BMI) of 35.0 to 35.9 and comorbidity (H)    Biventricular ICD (implantable cardioverter-defibrillator) in place    Non-ischemic cardiomyopathy (H)    Malignant melanoma of left lower leg (H)    Spinal stenosis of lumbar region with neurogenic claudication    Diastolic heart failure (H)        Past Medical History:   Diagnosis Date    Alpha thalassemia trait     Created by [employer] [facility] Annotation: May  7 2008 11:11AM - Destiny Pat: alpha   thalassemia   trait       Coronary artery disease     hilton score 148  mostly lad    Family history of myocardial infarction     pgf    Gout     High cholesterol     dislipidemia    Hyperlipidemia     Hypertension     Morbid obesity (H) 11/10/2017    Nephrolithiasis     Spinal stenosis in cervical region     C5 - C6 WITH LEFT C6 IMPINGEMENT       Thalassemia      Past Surgical History:   Procedure Laterality Date    COLONOSCOPY N/A 2/26/2019    Procedure: COLONOSCOPY with polypectomy;  Surgeon: Lara Harman MD;  Location: [facility];  Service:  Gastroenterology    INGUINAL HERNIA REPAIR      REVISE SECONDARY VARICOSITY      Description: Varicose Vein Ligation;  Recorded: 2009;     Family History   Problem Relation Age of Onset    Kidney failure Mother     Diabetes Type 2  Mother     Gout Other     Lung Cancer Father      Family history is otherwise noncontributory.     Social History     Occupational History    Not on file   Tobacco Use    Smoking status: Former     Types: Cigarettes     Quit date: 1988     Years since quittin.6    Smokeless tobacco: Never    Tobacco comments:     35 years ago   Vaping Use    Vaping Use: Never used   Substance and Sexual Activity    Alcohol use: Yes     Alcohol/week: 12.0 standard drinks of alcohol    Drug use: Yes     Types: Marijuana    Sexual activity: Yes     Partners: Female     Birth control/protection: None      Social History     Social History Narrative    He works for [employer] as a manager.  He is  and has 3 children and 3 grandchildren.      Current Outpatient Medications   Medication Instructions    allopurinol (ZYLOPRIM) 300 MG tablet TAKE 1 TABLET BY MOUTH EVERY DAY    furosemide (LASIX) 20 mg, DAILY    lisinopril (ZESTRIL) 20 mg, Oral, DAILY    metoprolol succinate ER (TOPROL XL) 25 MG 24 hr tablet TAKE 1 TABLET BY MOUTH IN THE MORNING AND 2 TABLETS IN THE EVENING    naproxen sodium (ANAPROX) 220 mg, 2 TIMES DAILY WITH MEALS    predniSONE (DELTASONE) 40 mg, Oral, DAILY    sildenafil (REVATIO) 20 MG tablet [SILDENAFIL (REVATIO) 20 MG TABLET] Two to four tabs as needed for erectile dysfunction     Allergies: Simvastatin and Spironolactone     Immunization History   Administered Date(s) Administered    COVID-19 Bivalent 12+ (Pfizer) 10/26/2022    COVID-19 MONOVALENT 12+ (Pfizer) 2021, 2021, 2021    DT (PEDS <7y) 1999, 1999    Influenza Vaccine 65+ (Fluzone HD) 10/26/2022    Pneumococcal 20 valent Conjugate (Prevnar 20) 2022    TD,PF 7+  "(Tenivac) 02/21/2020    TDAP (Adacel,Boostrix) 05/21/2010    Td (Adult), Adsorbed 01/01/1999    Td, Absorbed, Pf, Adult, Lf Unspecified 02/21/2020    Td,adult,historic,unspecified 01/01/1999    Zoster recombinant adjuvanted (SHINGRIX) 05/25/2021, 08/20/2021    Zoster vaccine, live 07/19/2013      Objective:     Wt Readings from Last 3 Encounters:   08/24/23 111.8 kg (246 lb 8 oz)   09/12/22 106.1 kg (234 lb)   08/16/22 106.5 kg (234 lb 11.2 oz)     BP Readings from Last 3 Encounters:   08/24/23 138/80   09/12/22 118/66   08/16/22 109/56       PHYSICAL EXAM  /80 (BP Location: Right arm, Patient Position: Sitting, Cuff Size: Adult Large)   Pulse 58   Ht 1.772 m (5' 9.75\")   Wt 111.8 kg (246 lb 8 oz)   SpO2 98%   BMI 35.62 kg/m     The patient is comfortable, no acute distress.  Mood good.  Insight is good.  No skin lesions or nodules of concern.  Ears clear.  Eyes are nonicteric.  Pupils equal and reactive.  Throat is clear.  Neck is supple without mass, no thyromegaly. No cervical or epitrochlear adenopathy.  Heart regular rate and rhythm.  Lungs clear to auscultation bilaterally.  Respiratory effort good.  Abdomen soft and nontender.  No hepatosplenomegaly.  Extremities show no edema. Hands show changes of carpometacarpal (CMC) osteoarthritis (OA).        Diagnostics:     Lab on 08/15/2023   Component Date Value Ref Range Status    Sodium 08/15/2023 139  136 - 145 mmol/L Final    Potassium 08/15/2023 5.3  3.4 - 5.3 mmol/L Final    Chloride 08/15/2023 105  98 - 107 mmol/L Final    Carbon Dioxide (CO2) 08/15/2023 27  22 - 29 mmol/L Final    Anion Gap 08/15/2023 7  7 - 15 mmol/L Final    Urea Nitrogen 08/15/2023 12.9  8.0 - 23.0 mg/dL Final    Creatinine 08/15/2023 1.02  0.67 - 1.17 mg/dL Final    Calcium 08/15/2023 9.4  8.8 - 10.2 mg/dL Final    Glucose 08/15/2023 112 (H)  70 - 99 mg/dL Final    GFR Estimate 08/15/2023 81  >60 mL/min/1.73m2 Final    Uric Acid 08/15/2023 5.8  3.4 - 7.0 mg/dL Final    " Prostate Specific Antigen Screen 08/15/2023 5.76 (H)  0.00 - 4.50 ng/mL Final    WBC Count 08/15/2023 8.0  4.0 - 11.0 10e3/uL Final    RBC Count 08/15/2023 6.09 (H)  4.40 - 5.90 10e6/uL Final    Hemoglobin 08/15/2023 12.2 (L)  13.3 - 17.7 g/dL Final    Hematocrit 08/15/2023 39.5 (L)  40.0 - 53.0 % Final    MCV 08/15/2023 65 (L)  78 - 100 fL Final    MCH 08/15/2023 20.0 (L)  26.5 - 33.0 pg Final    MCHC 08/15/2023 30.9 (L)  31.5 - 36.5 g/dL Final    RDW 08/15/2023 17.7 (H)  10.0 - 15.0 % Final    Platelet Count 08/15/2023 150  150 - 450 10e3/uL Final    Protein Total 08/15/2023 6.5  6.4 - 8.3 g/dL Final    Albumin 08/15/2023 4.2  3.5 - 5.2 g/dL Final    Bilirubin Total 08/15/2023 0.5  <=1.2 mg/dL Final    Alkaline Phosphatase 08/15/2023 53  40 - 129 U/L Final    AST 08/15/2023 35  0 - 45 U/L Final    Reference intervals for this test were updated on 6/12/2023 to more accurately reflect our healthy population. There may be differences in the flagging of prior results with similar values performed with this method. Interpretation of those prior results can be made in the context of the updated reference intervals.    ALT 08/15/2023 31  0 - 70 U/L Final    Reference intervals for this test were updated on 6/12/2023 to more accurately reflect our healthy population. There may be differences in the flagging of prior results with similar values performed with this method. Interpretation of those prior results can be made in the context of the updated reference intervals.      Bilirubin Direct 08/15/2023 <0.20  0.00 - 0.30 mg/dL Final        No results found for any visits on 08/24/23.     Assessment:     1. Encounter for Medicare annual wellness exam    2. Mixed hyperlipidemia    3. Former smoker    4. Arthritis of carpometacarpal (CMC) joint of both thumbs    5. Elevated prostate specific antigen (PSA)    6. Tubular adenoma of colon    7. Spinal stenosis in cervical region    8. Distal Ureteral Stone On The Left    9.  Primary hypertension    10. Alpha thalassemia trait    11. Gout    12. Non-ischemic cardiomyopathy (H)   Follows with cardiology.  Ejection fraction (EF) improved.   13. Malignant melanoma of left lower leg (H)   Follows with dermatology.  No signs of recurrence.   14. Biventricular ICD (implantable cardioverter-defibrillator) in place    15. Morbid obesity (H)   Complicated by hypertension.  Work on weight loss.   16. SVT (supraventricular tachycardia) (H)   See on device.  No symptoms related to this at this time.  Follow up cardiology.        Plan:     Blood work ordered for next year.  Continue current medications.  Wishes to monitor prostate specific antigen (PSA) at this time.  Continue current medications.  Follow up sooner if issues.    Orders Placed This Encounter   Procedures    Lipid panel reflex to direct LDL Fasting    Comprehensive metabolic panel    PSA, total and free    CBC with Platelets & Differential        A personalized health plan based on the identified health risks was provided to the patient on the AVS.       Paramjit Boyle MD  General Internal Medicine  [facility]    Return in about 1 year (around 8/24/2024), or if symptoms worsen or fail to improve, for annual wellness visit.     No future appointments.      Health Maintenance   Topic Date Due    AORTIC ANEURYSM SCREENING (SYSTEM ASSIGNED)  Never done    ANNUAL REVIEW OF  ORDERS  08/16/2023    COLORECTAL CANCER SCREENING  09/10/2023    INFLUENZA VACCINE (1) 09/01/2023    BMP  02/15/2024    ALT  08/15/2024    CBC  08/15/2024    MEDICARE ANNUAL WELLNESS VISIT  08/24/2024    FALL RISK ASSESSMENT  08/24/2024    LIPID  08/15/2025    HF ACTION PLAN  08/24/2026    ADVANCE CARE PLANNING  08/24/2028    DTAP/TDAP/TD IMMUNIZATION (4 - Td or Tdap) 02/21/2030    TSH W/FREE T4 REFLEX  Completed    PHQ-2 (once per calendar year)  Completed    Pneumococcal Vaccine: 65+ Years  Completed    ZOSTER IMMUNIZATION  Completed     COVID-19 Vaccine  Completed    IPV IMMUNIZATION  Aged Out    MENINGITIS IMMUNIZATION  Aged Out    HEPATITIS C SCREENING  Discontinued        I have reviewed Opioid Use Disorder and Substance Use Disorder risk factors and made any needed referrals.  This may not apply to this individual patient, but this documentation is required by Medicare.  The patient was provided with written information regarding signs of hearing loss.

## 2023-08-24 NOTE — PROGRESS NOTES
"      Are you in the first 12 months of your Medicare coverage?  No    Healthy Habits:     In general, how would you rate your overall health?  Good    Frequency of exercise:  2-3 days/week    Duration of exercise:  Less than 15 minutes    Do you usually eat at least 4 servings of fruit and vegetables a day, include whole grains    & fiber and avoid regularly eating high fat or \"junk\" foods?  Yes    Taking medications regularly:  Yes    Medication side effects:  Muscle aches    Ability to successfully perform activities of daily living:  No assistance needed    Home Safety:  Lack of grab bars in the bathroom    Hearing Impairment:  Difficulty following a conversation in a noisy restaurant or crowded room, need to ask people to speak up or repeat themselves and difficulty understanding soft or whispered speech    In the past 6 months, have you been bothered by leaking of urine?  No    In general, how would you rate your overall mental or emotional health?  Good    Additional concerns today:  No        Have you ever done Advance Care Planning? (For example, a Health Directive, POLST, or a discussion with a medical provider or your loved ones about your wishes): No, advance care planning information given to patient to review.  Patient plans to discuss their wishes with loved ones or provider.         Fall risk  Fallen 2 or more times in the past year?: No  Any fall with injury in the past year?: No    Cognitive Screening   1) Repeat 3 items (Leader, Season, Table)    2) Clock draw: NORMAL  3) 3 item recall: Recalls 3 objects  Results: 3 items recalled: COGNITIVE IMPAIRMENT LESS LIKELY    Mini-CogTM Copyright CANDY Weeks. Licensed by the author for use in [facility]; reprinted with permission (farzad@.[facility]). All rights reserved.      Do you have sleep apnea, excessive snoring or daytime drowsiness? : no  "

## 2023-10-02 DIAGNOSIS — N52.9 ERECTILE DYSFUNCTION, UNSPECIFIED ERECTILE DYSFUNCTION TYPE: ICD-10-CM

## 2023-10-02 RX ORDER — SILDENAFIL CITRATE 20 MG/1
TABLET ORAL
Qty: 30 TABLET | Refills: 9 | Status: SHIPPED | OUTPATIENT
Start: 2023-10-02

## 2023-11-10 DIAGNOSIS — E79.0 HYPERURICEMIA: ICD-10-CM

## 2023-11-10 RX ORDER — ALLOPURINOL 300 MG/1
1 TABLET ORAL DAILY
Qty: 90 TABLET | Refills: 2 | Status: SHIPPED | OUTPATIENT
Start: 2023-11-10 | End: 2024-09-02

## 2023-12-14 ENCOUNTER — PATIENT OUTREACH (OUTPATIENT)
Dept: GASTROENTEROLOGY | Facility: CLINIC | Age: 68
End: 2023-12-14
Payer: MEDICARE

## 2023-12-14 DIAGNOSIS — Z12.11 SPECIAL SCREENING FOR MALIGNANT NEOPLASMS, COLON: Primary | ICD-10-CM

## 2023-12-14 NOTE — PROGRESS NOTES
"Hx adenomatous polyps. Per provider note from 5/25/2021, pt had a colonoscopy 2/2019 and recalled in 5 years. Due February 2024  [facility] Screening Colonoscopy Referral Review    Patient meets the inclusion criteria for screening colonoscopy standing order.    Ordering/Referring Provider:  Paramjit Boyle    BMI: Estimated body mass index is 35.62 kg/m  as calculated from the following:    Height as of 8/24/23: 1.772 m (5' 9.75\").    Weight as of 8/24/23: 111.8 kg (246 lb 8 oz).     Sedation:  Does patient have any of the following conditions affecting sedation?  No medical conditions affecting sedation.    Previous Scopes:  Any previous recommendations or follow up needs based on previous scope?  na / No recommendations.    Medical Concerns to Postpone Order:  Does patient have any of the following medical concerns that should postpone/delay colonoscopy referral?  No medical conditions affecting colonoscopy referral.    Final Referral Details:  Based on patient's medical history patient is appropriate for referral order with moderate sedation. If patient's BMI > 50 do not schedule in [facility].  "

## 2023-12-14 NOTE — LETTER
12/14/2023         RE: Cj Randhawa  [address] [facility] [address]        Gala Corona,    We hope this letter finds you doing well.     Your health is important to us at [facility]. Our records indicate that you will be due for a screening or surveillance colonoscopy in February 2024.     An order has been placed for you to have this completed. Our scheduling team will be reaching out to you to assist in getting this scheduled. If you do not hear from them within 7 days or you would like to schedule sooner, please call [phone], option 2 for endoscopy scheduling.     If you believe this is in error and have already had a colonoscopy done, please have your results and recommendations faxed to [phone].    If you have questions about this order or have further concerns, please reach out to your primary care provider.     Andrea     Colorectal Cancer RN Screening Team  [facility]

## 2024-01-23 ENCOUNTER — TELEPHONE (OUTPATIENT)
Dept: GASTROENTEROLOGY | Facility: CLINIC | Age: 69
End: 2024-01-23
Payer: MEDICARE

## 2024-01-23 NOTE — TELEPHONE ENCOUNTER
"Endoscopy Scheduling Screen    Have you had a positive Covid test in the last 14 days?  No    Are you active on MyChart?   Yes    What insurance is in the chart?  Other:  MEDICARE/AETNA    Ordering/Referring Provider:     JUNG JOEL      (If ordering provider performs procedure, schedule with ordering provider unless otherwise instructed. )    BMI: Estimated body mass index is 35.62 kg/m  as calculated from the following:    Height as of 8/24/23: 1.772 m (5' 9.75\").    Weight as of 8/24/23: 111.8 kg (246 lb 8 oz).     Sedation Ordered  moderate sedation.   If patient BMI > 50 do not schedule in [facility].    If patient BMI > 45 do not schedule at [facility].    Are you taking methadone or Suboxone?  No    Are you taking any prescription medications for pain 3 or more times per week?   NO - No RN review required.    Do you have a history of malignant hyperthermia or adverse reaction to anesthesia?  No    (Females) Are you currently pregnant?   No     Have you been diagnosed or told you have pulmonary hypertension?   No    Do you have an LVAD?  No    Have you been told you have moderate to severe sleep apnea?  No    Have you been told you have COPD, asthma, or any other lung disease?  No    Do you have any heart conditions?  Yes     In the past 6 months, have you had any hospitalizations for heart related issues including cardiomyopathy, heart attack, or stent placement?  No    Do you have any implantable devices in your body (pacemaker, ICD)?  ICD (schedule colonoscopy in Hospital Only)    Do you take nitroglycerine?  No    Have you ever had an organ transplant?   No    Have you ever had or are you awaiting a heart or lung transplant?   No    Have you had a stroke or transient ischemic attack (TIA aka \"mini stroke\" in the last 6 months?   No    Have you been diagnosed with or been told you have cirrhosis of the liver?   No    Are you currently on dialysis?   No    Do you need assistance transferring?   No    BMI: Estimated " "body mass index is 35.62 kg/m  as calculated from the following:    Height as of 8/24/23: 1.772 m (5' 9.75\").    Weight as of 8/24/23: 111.8 kg (246 lb 8 oz).     Is patients BMI > 40 and scheduling location [facility]?  No    Do you take an injectable medication for weight loss or diabetes (excluding insulin)?  No    Do you take the medication Naltrexone?  No    Do you take blood thinners?  No       Prep   Are you currently on dialysis or do you have chronic kidney disease?  No    Do you have a diagnosis of diabetes?  No    Do you have a diagnosis of cystic fibrosis (CF)?  No    On a regular basis do you go 3 -5 days between bowel movements?  No    BMI > 40?  No    Preferred Pharmacy:    [facility] [id number] [place] [address] [address]  Phone: [phone] Fax: [phone]      Final Scheduling Details   Colonoscopy prep sent?  Standard MiraLAX    Procedure scheduled  Colonoscopy    Surgeon:  GERONIMO     Date of procedure:  05/01/2024    Pre-OP / PAC:   Yes - Patient informed of pre-op requirement.    Location  [facility] - Per order.    Sedation   MAC/Deep Sedation - Patient preference.      Patient Reminders:   You will receive a call from a Nurse to review instructions and health history.  This assessment must be completed prior to your procedure.  Failure to complete the Nurse assessment may result in the procedure being cancelled.      On the day of your procedure, please designate an adult(s) who can drive you home stay with you for the next 24 hours. The medicines used in the exam will make you sleepy. You will not be able to drive.      You cannot take public transportation, ride share services, or non-medical taxi service without a responsible caregiver.  Medical transport services are allowed with the requirement that a responsible caregiver will receive you at your destination.  We require that drivers and caregivers are confirmed prior to your procedure.  "

## 2024-04-17 ENCOUNTER — TELEPHONE (OUTPATIENT)
Dept: GASTROENTEROLOGY | Facility: CLINIC | Age: 69
End: 2024-04-17
Payer: MEDICARE

## 2024-04-17 NOTE — TELEPHONE ENCOUNTER
Pre assessment completed for upcoming procedure.   (Please see previous telephone encounter notes for complete details)    Patient  returned call.       Procedure details:    Arrival time and facility location reviewed.    Pre op exam needed? N/A    Designated  policy reviewed. Instructed to have someone stay 24  hours post procedure.       Medication review:    Medications reviewed. Please see supporting documentation below. Holding recommendations discussed (if applicable).       Prep for procedure:     Procedure prep instructions reviewed.        Any additional information needed:  N/A      Patient  verbalized understanding and had no questions or concerns at this time.      Corinne Kliber, RN  Endoscopy Procedure Pre Assessment RN  [phone] option 4

## 2024-04-17 NOTE — TELEPHONE ENCOUNTER
Attempted to contact patient in order to complete pre assessment questions.     No answer. Left message to return call to [phone] option 4    Callback required communication sent via [employer].      Karen Veras RN  Endoscopy Procedure Pre Assessment RN

## 2024-04-17 NOTE — TELEPHONE ENCOUNTER
Pre visit planning completed.      Procedure details:    Patient scheduled for Colonoscopy  on 5/1/24.     Arrival time: 1130. Procedure time 1230    Facility location: [facility]; [facility] [place] [address]. Check in location: 1st Floor [facility].     Sedation type: MAC - patient request per scheduling notes.    Pre op exam needed? Yes. Pre op physical exam scheduled for 4/19/24 with Paramjit Boyle MD.    Indication for procedure: Hx colon polyps      Chart review:     Electronic implanted devices? Yes: Medtronic ICD    Recent diagnosis of diverticulitis within the last 6 weeks? No    Diabetic? No      Medication review:    Anticoagulants? No    NSAIDS? Yes.  Naproxen (Naprosyn, Aleve).  Holding interval of 4 days.    Other medication HOLDING recommendations:  N/A      Prep for procedure:     Bowel prep recommendation: Standard Miralax  Due to: standard bowel prep.    Prep instructions sent via [employer]         Karen Veras RN  Endoscopy Procedure Pre Assessment RN  [phone] option 4

## 2024-04-19 ENCOUNTER — OFFICE VISIT (OUTPATIENT)
Dept: INTERNAL MEDICINE | Facility: CLINIC | Age: 69
End: 2024-04-19
Payer: MEDICARE

## 2024-04-19 DIAGNOSIS — D56.3 ALPHA THALASSEMIA TRAIT: ICD-10-CM

## 2024-04-19 DIAGNOSIS — I42.8 NON-ISCHEMIC CARDIOMYOPATHY (H): ICD-10-CM

## 2024-04-19 DIAGNOSIS — I47.10 SVT (SUPRAVENTRICULAR TACHYCARDIA) (H): ICD-10-CM

## 2024-04-19 DIAGNOSIS — Z12.11 SCREEN FOR COLON CANCER: ICD-10-CM

## 2024-04-19 DIAGNOSIS — E66.01 MORBID OBESITY (H): ICD-10-CM

## 2024-04-19 DIAGNOSIS — R97.20 ELEVATED PROSTATE SPECIFIC ANTIGEN (PSA): ICD-10-CM

## 2024-04-19 DIAGNOSIS — D12.6 TUBULAR ADENOMA OF COLON: ICD-10-CM

## 2024-04-19 DIAGNOSIS — E78.2 MIXED HYPERLIPIDEMIA: ICD-10-CM

## 2024-04-19 DIAGNOSIS — Z01.818 PREOPERATIVE EXAMINATION: Primary | ICD-10-CM

## 2024-04-19 DIAGNOSIS — C43.72 MALIGNANT MELANOMA OF LEFT LOWER LEG (H): ICD-10-CM

## 2024-04-19 DIAGNOSIS — R06.02 MILD SHORTNESS OF BREATH: ICD-10-CM

## 2024-04-19 DIAGNOSIS — I10 PRIMARY HYPERTENSION: Chronic | ICD-10-CM

## 2024-04-19 LAB
ACANTHOCYTES BLD QL SMEAR: ABNORMAL
AUER BODIES BLD QL SMEAR: ABNORMAL
BASO STIPL BLD QL SMEAR: ABNORMAL
BASOPHILS # BLD AUTO: 0.1 10E3/UL (ref 0–0.2)
BASOPHILS NFR BLD AUTO: 1 %
BITE CELLS BLD QL SMEAR: ABNORMAL
BLISTER CELLS BLD QL SMEAR: ABNORMAL
BURR CELLS BLD QL SMEAR: ABNORMAL
DACRYOCYTES BLD QL SMEAR: ABNORMAL
ELLIPTOCYTES BLD QL SMEAR: SLIGHT
EOSINOPHIL # BLD AUTO: 0.3 10E3/UL (ref 0–0.7)
EOSINOPHIL NFR BLD AUTO: 4 %
ERYTHROCYTE [DISTWIDTH] IN BLOOD BY AUTOMATED COUNT: 18.6 % (ref 10–15)
FRAGMENTS BLD QL SMEAR: ABNORMAL
GIANT PLATELETS BLD QL SMEAR: ABNORMAL
HCT VFR BLD AUTO: 41.4 % (ref 40–53)
HGB BLD-MCNC: 13 G/DL (ref 13.3–17.7)
HGB C CRYSTALS: ABNORMAL
HOWELL-JOLLY BOD BLD QL SMEAR: ABNORMAL
IMM GRANULOCYTES # BLD: 0 10E3/UL
IMM GRANULOCYTES NFR BLD: 0 %
LYMPHOCYTES # BLD AUTO: 2.6 10E3/UL (ref 0.8–5.3)
LYMPHOCYTES NFR BLD AUTO: 32 %
MCH RBC QN AUTO: 19.8 PG (ref 26.5–33)
MCHC RBC AUTO-ENTMCNC: 31.4 G/DL (ref 31.5–36.5)
MCV RBC AUTO: 63 FL (ref 78–100)
MONOCYTES # BLD AUTO: 0.8 10E3/UL (ref 0–1.3)
MONOCYTES NFR BLD AUTO: 10 %
NEUTROPHILS # BLD AUTO: 4.5 10E3/UL (ref 1.6–8.3)
NEUTROPHILS NFR BLD AUTO: 54 %
NEUTS HYPERSEG BLD QL SMEAR: ABNORMAL
NRBC # BLD AUTO: 0 10E3/UL
NRBC BLD AUTO-RTO: 0 /100
PATH REV: ABNORMAL
PLAT MORPH BLD: ABNORMAL
PLATELET # BLD AUTO: 154 10E3/UL (ref 150–450)
POLYCHROMASIA BLD QL SMEAR: ABNORMAL
RBC # BLD AUTO: 6.57 10E6/UL (ref 4.4–5.9)
RBC AGGLUT BLD QL: ABNORMAL
RBC MORPH BLD: ABNORMAL
ROULEAUX BLD QL SMEAR: ABNORMAL
SICKLE CELLS BLD QL SMEAR: ABNORMAL
SMUDGE CELLS BLD QL SMEAR: ABNORMAL
SPHEROCYTES BLD QL SMEAR: ABNORMAL
STOMATOCYTES BLD QL SMEAR: ABNORMAL
TARGETS BLD QL SMEAR: ABNORMAL
TOXIC GRANULES BLD QL SMEAR: ABNORMAL
VARIANT LYMPHS BLD QL SMEAR: ABNORMAL
WBC # BLD AUTO: 8.3 10E3/UL (ref 4–11)

## 2024-04-19 PROCEDURE — 83735 ASSAY OF MAGNESIUM: CPT | Performed by: INTERNAL MEDICINE

## 2024-04-19 PROCEDURE — 85025 COMPLETE CBC W/AUTO DIFF WBC: CPT | Performed by: INTERNAL MEDICINE

## 2024-04-19 PROCEDURE — 83880 ASSAY OF NATRIURETIC PEPTIDE: CPT | Performed by: INTERNAL MEDICINE

## 2024-04-19 PROCEDURE — 80053 COMPREHEN METABOLIC PANEL: CPT | Performed by: INTERNAL MEDICINE

## 2024-04-19 PROCEDURE — 80061 LIPID PANEL: CPT | Performed by: INTERNAL MEDICINE

## 2024-04-19 PROCEDURE — 84154 ASSAY OF PSA FREE: CPT | Performed by: INTERNAL MEDICINE

## 2024-04-19 PROCEDURE — 99215 OFFICE O/P EST HI 40 MIN: CPT | Performed by: INTERNAL MEDICINE

## 2024-04-19 PROCEDURE — 36415 COLL VENOUS BLD VENIPUNCTURE: CPT | Performed by: INTERNAL MEDICINE

## 2024-04-19 PROCEDURE — 84153 ASSAY OF PSA TOTAL: CPT | Performed by: INTERNAL MEDICINE

## 2024-04-19 RX ORDER — PREDNISONE 20 MG/1
40 TABLET ORAL DAILY
Qty: 10 TABLET | Refills: 0 | Status: CANCELLED | OUTPATIENT
Start: 2024-04-19

## 2024-04-19 RX ORDER — RESPIRATORY SYNCYTIAL VIRUS VACCINE 120MCG/0.5
0.5 KIT INTRAMUSCULAR ONCE
Qty: 1 EACH | Refills: 0 | Status: CANCELLED | OUTPATIENT
Start: 2024-04-19 | End: 2024-04-19

## 2024-04-19 RX ORDER — METOPROLOL SUCCINATE 50 MG/1
1 TABLET, EXTENDED RELEASE ORAL DAILY
COMMUNITY
Start: 2024-04-03

## 2024-04-19 ASSESSMENT — PAIN SCALES - GENERAL: PAINLEVEL: MILD PAIN (2)

## 2024-04-19 NOTE — PROGRESS NOTES
[facility]  Primary Care Specialists    Care coordination - Customized care -  Comprehensive and complex medical care            Date of Service: 4/19/2024  Primary Provider: Paramjit Boyle    Patient Care Team:  Paramjit Boyle MD as PCP - General (Internal Medicine)  Chris Brandt MD as MD (Dermatology)  Francis Cruz MD as MD (Cardiovascular Disease)  Paramjit Boyle MD as Assigned PCP           Patient's Pharmacy:    [id number] [place] [facility] - [address] [address]  Phone: [phone] Fax: [phone]    [facility] - [place], [place] - [address]  [address] [facility] [address]  Phone: [phone] Fax: [phone]     Patient's Contacts:  Name Home Phone Work Phone Mobile Phone Relationship Lgl Grd   SILVINO RANDHAWA [phone] Spouse    PABLITO RANDHAWA [phone]   Other      Patient's Insurance:    Payor: MEDICARE / Plan: MEDICARE / Product Type: Medicare /           Preoperative examination    Cj Randhawa is a 68 year old male (1955) who I am asked to see by his surgeon regarding his fitness for upcoming surgery.      Chief Complaint   Patient presents with    Pre-Op Exam      Subjective:     First reviewed his upcoming colonoscopy.  Will need a preop evaluation for this.  No symptoms.    Blood pressure today a little higher but reviewed this.  Has some mild shortness of breath and does not feel any worse than baseline.  No chest pain or chest pressure.    Arthritis in his hands and shoulder have been worse.  No swelling.  Carpometacarpal (CMC) joints particularly bothering.    Follow up with cardiology reviewed.    Has history of elevated prostate specific antigen (PSA) and we will want to recheck this.    Has history of tubular adenoma and colonoscopy will follow up on this.    ______________________________________________________________________         4/19/2024    11:25 AM    Pre-op Questionnaire   1. Have you ever had a heart attack or stroke? No   2. Have you ever had surgery on your heart or blood vessels, such as a stent placement, a coronary artery bypass, or surgery on an artery in your head, neck, heart, or legs? YES - ICD   3. Do you have chest pain with activity? No   4. Do you have a history of  heart failure? No   5. Do you currently have a cold, bronchitis or symptoms of other infection? No   6. Do you have a cough, shortness of breath, or wheezing? No   7. Do you or anyone in your family have previous history of blood clots? No   8. Do you or does anyone in your family have a serious bleeding problem such as prolonged bleeding following surgeries or cuts? No   9. Have you ever had problems with anemia or been told to take iron pills? No   10. Have you had any abnormal blood loss such as black, tarry or bloody stools? No   11. Have you ever had a blood transfusion? No   12. Are you willing to have a blood transfusion if it is medically needed before, during, or after your surgery? Yes   13. Have you or any of your relatives ever had problems with anesthesia? No   14. Do you have sleep apnea, excessive snoring or daytime drowsiness? No   15. Do you have any artifical heart valves or other implanted medical devices like a pacemaker, defibrillator, or continuous glucose monitor? YES    15a. What type of device do you have? defibulater /pacer   15b. Name of the clinic that manages your device:  [facility]  /  [facility]   16. Do you have artificial joints? No   17. Are you allergic to latex? No     ______________________________________________________________________     We reviewed his other issues noted in the assessment but not specifically addressed in the HPI above.           Patient Active Problem List   Diagnosis    Spinal stenosis in cervical region    Mixed hyperlipidemia    Nephrolithiasis    Alpha thalassemia trait    Gout    Primary hypertension    Tubular adenoma of  colon - last colonoscopy     Morbid obesity (H)    Biventricular ICD (implantable cardioverter-defibrillator) in place    Non-ischemic cardiomyopathy (H)    Malignant melanoma of left lower leg (H)    Spinal stenosis of lumbar region with neurogenic claudication    Former smoker    Elevated prostate specific antigen (PSA)    ED (erectile dysfunction)    SVT (supraventricular tachycardia) (H24)       Past Surgical History:   Procedure Laterality Date    COLONOSCOPY N/A 2019    Procedure: COLONOSCOPY with polypectomy;  Surgeon: Lara Harman MD;  Location: [facility];  Service: Gastroenterology    INGUINAL HERNIA REPAIR      REVISE SECONDARY VARICOSITY      Description: Varicose Vein Ligation;  Recorded: 2009;      Social History     Occupational History    Not on file   Tobacco Use    Smoking status: Former     Current packs/day: 0.00     Types: Cigarettes     Quit date: 1988     Years since quittin.3    Smokeless tobacco: Never    Tobacco comments:     35 years ago   Vaping Use    Vaping status: Never Used   Substance and Sexual Activity    Alcohol use: Yes     Alcohol/week: 12.0 standard drinks of alcohol    Drug use: Yes     Types: Marijuana    Sexual activity: Yes     Partners: Female     Birth control/protection: None      Social History     Social History Narrative    He works for [employer] as a manager.  He is  and has 3 children and 3 grandchildren.      Family History   Problem Relation Age of Onset    Kidney failure Mother     Diabetes Type 2  Mother     Gout Other     Lung Cancer Father      Family history is noncontributory otherwise.    Allergies: Simvastatin and Spironolactone     Current medications:  Current Outpatient Medications   Medication Instructions    allopurinol (ZYLOPRIM) 300 mg, Oral, DAILY    lisinopril (ZESTRIL) 20 mg, Oral, DAILY    metoprolol succinate ER (TOPROL XL) 50 MG 24 hr tablet 1 tablet, Oral, DAILY    naproxen sodium  "(ANAPROX) 220 mg, Oral, DAILY    sildenafil (REVATIO) 20 MG tablet [SILDENAFIL (REVATIO) 20 MG TABLET] Two to four tabs as needed for erectile dysfunction        Objective:     Wt Readings from Last 3 Encounters:   04/19/24 115.3 kg (254 lb 3.2 oz)   08/24/23 111.8 kg (246 lb 8 oz)   09/12/22 106.1 kg (234 lb)     BP Readings from Last 3 Encounters:   04/19/24 (!) 143/77   08/24/23 138/80   09/12/22 118/66     BP (!) 143/77 (BP Location: Right arm, Patient Position: Sitting, Cuff Size: Adult Large)   Pulse 68   Temp 98.5  F (36.9  C) (Oral)   Resp 18   Ht 1.772 m (5' 9.75\")   Wt 115.3 kg (254 lb 3.2 oz)   SpO2 99%   BMI 36.74 kg/m     Patient is in no acute distress.  Mood good.  Insight good.  Eyes nonicteric.  Pupils equal.  Ears clear.  Nose clear.  Throat clear.  Neck is supple.  No cervical adenopathy.  No thyromegaly.  Heart is regular rate and rhythm.  Lungs clear to auscultation bilaterally.  Respiratory effort is good.  Extremities no edema.       Diagnostics:     Lab on 08/15/2023   Component Date Value Ref Range Status    Sodium 08/15/2023 139  136 - 145 mmol/L Final    Potassium 08/15/2023 5.3  3.4 - 5.3 mmol/L Final    Chloride 08/15/2023 105  98 - 107 mmol/L Final    Carbon Dioxide (CO2) 08/15/2023 27  22 - 29 mmol/L Final    Anion Gap 08/15/2023 7  7 - 15 mmol/L Final    Urea Nitrogen 08/15/2023 12.9  8.0 - 23.0 mg/dL Final    Creatinine 08/15/2023 1.02  0.67 - 1.17 mg/dL Final    Calcium 08/15/2023 9.4  8.8 - 10.2 mg/dL Final    Glucose 08/15/2023 112 (H)  70 - 99 mg/dL Final    GFR Estimate 08/15/2023 81  >60 mL/min/1.73m2 Final    Uric Acid 08/15/2023 5.8  3.4 - 7.0 mg/dL Final    Prostate Specific Antigen Screen 08/15/2023 5.76 (H)  0.00 - 4.50 ng/mL Final    WBC Count 08/15/2023 8.0  4.0 - 11.0 10e3/uL Final    RBC Count 08/15/2023 6.09 (H)  4.40 - 5.90 10e6/uL Final    Hemoglobin 08/15/2023 12.2 (L)  13.3 - 17.7 g/dL Final    Hematocrit 08/15/2023 39.5 (L)  40.0 - 53.0 % Final    MCV " 08/15/2023 65 (L)  78 - 100 fL Final    MCH 08/15/2023 20.0 (L)  26.5 - 33.0 pg Final    MCHC 08/15/2023 30.9 (L)  31.5 - 36.5 g/dL Final    RDW 08/15/2023 17.7 (H)  10.0 - 15.0 % Final    Platelet Count 08/15/2023 150  150 - 450 10e3/uL Final    Protein Total 08/15/2023 6.5  6.4 - 8.3 g/dL Final    Albumin 08/15/2023 4.2  3.5 - 5.2 g/dL Final    Bilirubin Total 08/15/2023 0.5  <=1.2 mg/dL Final    Alkaline Phosphatase 08/15/2023 53  40 - 129 U/L Final    AST 08/15/2023 35  0 - 45 U/L Final    Reference intervals for this test were updated on 6/12/2023 to more accurately reflect our healthy population. There may be differences in the flagging of prior results with similar values performed with this method. Interpretation of those prior results can be made in the context of the updated reference intervals.    ALT 08/15/2023 31  0 - 70 U/L Final    Reference intervals for this test were updated on 6/12/2023 to more accurately reflect our healthy population. There may be differences in the flagging of prior results with similar values performed with this method. Interpretation of those prior results can be made in the context of the updated reference intervals.      Bilirubin Direct 08/15/2023 <0.20  0.00 - 0.30 mg/dL Final       Results for orders placed or performed in visit on 04/19/24   Lipid panel reflex to direct LDL Fasting     Status: Abnormal   Result Value Ref Range    Cholesterol 204 (H) <200 mg/dL    Triglycerides 276 (H) <150 mg/dL    Direct Measure HDL 37 (L) >=40 mg/dL    LDL Cholesterol Calculated 112 (H) <=100 mg/dL    Non HDL Cholesterol 167 (H) <130 mg/dL    Patient Fasting > 8hrs? Yes     Narrative    Cholesterol  Desirable:  <200 mg/dL    Triglycerides  Normal:  Less than 150 mg/dL  Borderline High:  150-199 mg/dL  High:  200-499 mg/dL  Very High:  Greater than or equal to 500 mg/dL    Direct Measure HDL  Female:  Greater than or equal to 50 mg/dL   Male:  Greater than or equal to 40 mg/dL    LDL  Cholesterol  Desirable:  <100mg/dL  Above Desirable:  100-129 mg/dL   Borderline High:  130-159 mg/dL   High:  160-189 mg/dL   Very High:  >= 190 mg/dL    Non HDL Cholesterol  Desirable:  130 mg/dL  Above Desirable:  130-159 mg/dL  Borderline High:  160-189 mg/dL  High:  190-219 mg/dL  Very High:  Greater than or equal to 220 mg/dL   Comprehensive metabolic panel     Status: Abnormal   Result Value Ref Range    Sodium 139 135 - 145 mmol/L    Potassium 4.7 3.4 - 5.3 mmol/L    Carbon Dioxide (CO2) 25 22 - 29 mmol/L    Anion Gap 10 7 - 15 mmol/L    Urea Nitrogen 15.1 8.0 - 23.0 mg/dL    Creatinine 1.04 0.67 - 1.17 mg/dL    GFR Estimate 79 >60 mL/min/1.73m2    Calcium 9.6 8.8 - 10.2 mg/dL    Chloride 104 98 - 107 mmol/L    Glucose 108 (H) 70 - 99 mg/dL    Alkaline Phosphatase 57 40 - 150 U/L    AST 41 0 - 45 U/L    ALT 44 0 - 70 U/L    Protein Total 7.0 6.4 - 8.3 g/dL    Albumin 4.4 3.5 - 5.2 g/dL    Bilirubin Total 0.6 <=1.2 mg/dL   PSA, total and free     Status: Abnormal   Result Value Ref Range    PSA Free 1.5 ng/mL    PSA Tumor Marker 5.73 (H) 0.00 - 4.50 ng/mL    PSA Percent Free 26.18 %    Narrative    Results are obtained using the Roche Elecsys total PSA and free PSA methods on the michael [id number] immunoassay analyzer. Results obtained with different assay methods or kits cannot be used interchangeably.   N terminal pro BNP outpatient     Status: Normal   Result Value Ref Range    N Terminal Pro BNP Outpatient 38 0 - 900 pg/mL   Magnesium     Status: Normal   Result Value Ref Range    Magnesium 2.1 1.7 - 2.3 mg/dL   CBC with platelets and differential     Status: Abnormal   Result Value Ref Range    WBC Count 8.3 4.0 - 11.0 10e3/uL    RBC Count 6.57 (H) 4.40 - 5.90 10e6/uL    Hemoglobin 13.0 (L) 13.3 - 17.7 g/dL    Hematocrit 41.4 40.0 - 53.0 %    MCV 63 (L) 78 - 100 fL    MCH 19.8 (L) 26.5 - 33.0 pg    MCHC 31.4 (L) 31.5 - 36.5 g/dL    RDW 18.6 (H) 10.0 - 15.0 %    Platelet Count 154 150 - 450 10e3/uL    %  Neutrophils 54 %    % Lymphocytes 32 %    % Monocytes 10 %    % Eosinophils 4 %    % Basophils 1 %    % Immature Granulocytes 0 %    NRBCs per 100 WBC 0 <1 /100    Absolute Neutrophils 4.5 1.6 - 8.3 10e3/uL    Absolute Lymphocytes 2.6 0.8 - 5.3 10e3/uL    Absolute Monocytes 0.8 0.0 - 1.3 10e3/uL    Absolute Eosinophils 0.3 0.0 - 0.7 10e3/uL    Absolute Basophils 0.1 0.0 - 0.2 10e3/uL    Absolute Immature Granulocytes 0.0 <=0.4 10e3/uL    Absolute NRBCs 0.0 10e3/uL   RBC and Platelet Morphology     Status: Abnormal   Result Value Ref Range    RBC Morphology Confirmed RBC Indices     Platelet Assessment  Automated Count Confirmed. Platelet morphology is normal.     Automated Count Confirmed. Platelet morphology is normal.    Giant Platelets      Acanthocytes      Marj Rods      Basophilic Stippling      Bite Cells      Blister Cells      Chris Cells      Elliptocytes Slight (A) None Seen    Hgb C Crystals      Hernandez-Jolly Bodies      Hypersegmented Neutrophils      Polychromasia      RBC agglutination      RBC Fragments      Reactive Lymphocytes      Rouleaux      Sickle Cells      Smudge Cells      Spherocytes      Stomatocytes      Target Cells      Teardrop Cells      Toxic Neutrophils      Pathologist Review Comments (Blood)     CBC with Platelets & Differential     Status: Abnormal    Narrative    The following orders were created for panel order CBC with Platelets & Differential.  Procedure                               Abnormality         Status                     ---------                               -----------         ------                     CBC with platelets and d...[[id number]]  Abnormal            Final result               RBC and Platelet Morphology[[id number]]  Abnormal            Final result                 Please view results for these tests on the individual orders.       Impression:     1. Preoperative examination    2. Tubular adenoma of colon - last colonoscopy 2019    3. Screen for colon  cancer    4. Mild shortness of breath    5. Primary hypertension    6. Mixed hyperlipidemia    7. Alpha thalassemia trait    8. Elevated prostate specific antigen (PSA)    9. SVT (supraventricular tachycardia) (H24)   Doing okay without symptoms.  Continue to monitor.   10. Non-ischemic cardiomyopathy (H)   Following with cardiology.  Continue to monitor.   11. Morbid obesity (H)   Complicated by hypertension.  Work on weight loss.   12. Malignant melanoma of left lower leg (H)   Doing okay at this time.  Following with dermatology.  Continue to monitor.       Plan:     Okay to proceed with surgery as planned.  Check lab work today.     Reviewed the blood work results through [employer] and options reviewed.  Continue current medications.  Follow up sooner if issues.      40 minutes or greater was spent today on the patient's care on the day of service.      This includes time for chart preparation, reviewing medical tests done before or during the visit, talking with the patient, review of quality indicators, required documentation, and other elements of care.        Paramjit Boyle MD  General Internal Medicine  [facility]    The longitudinal plan of care for the diagnoses and conditions as documented were addressed during this visit. Due to the added complexity in care, I will continue to support Jhonny in the subsequent management and with ongoing continuity of care.     Return in about 1 year (around 4/19/2025) for annual wellness visit.     No future appointments.

## 2024-04-20 LAB
ALBUMIN SERPL BCG-MCNC: 4.4 G/DL (ref 3.5–5.2)
ALP SERPL-CCNC: 57 U/L (ref 40–150)
ALT SERPL W P-5'-P-CCNC: 44 U/L (ref 0–70)
ANION GAP SERPL CALCULATED.3IONS-SCNC: 10 MMOL/L (ref 7–15)
AST SERPL W P-5'-P-CCNC: 41 U/L (ref 0–45)
BILIRUB SERPL-MCNC: 0.6 MG/DL
BUN SERPL-MCNC: 15.1 MG/DL (ref 8–23)
CALCIUM SERPL-MCNC: 9.6 MG/DL (ref 8.8–10.2)
CHLORIDE SERPL-SCNC: 104 MMOL/L (ref 98–107)
CHOLEST SERPL-MCNC: 204 MG/DL
CREAT SERPL-MCNC: 1.04 MG/DL (ref 0.67–1.17)
DEPRECATED HCO3 PLAS-SCNC: 25 MMOL/L (ref 22–29)
EGFRCR SERPLBLD CKD-EPI 2021: 79 ML/MIN/1.73M2
FASTING STATUS PATIENT QL REPORTED: YES
GLUCOSE SERPL-MCNC: 108 MG/DL (ref 70–99)
HDLC SERPL-MCNC: 37 MG/DL
LDLC SERPL CALC-MCNC: 112 MG/DL
MAGNESIUM SERPL-MCNC: 2.1 MG/DL (ref 1.7–2.3)
NONHDLC SERPL-MCNC: 167 MG/DL
NT-PROBNP SERPL-MCNC: 38 PG/ML (ref 0–900)
POTASSIUM SERPL-SCNC: 4.7 MMOL/L (ref 3.4–5.3)
PROT SERPL-MCNC: 7 G/DL (ref 6.4–8.3)
SODIUM SERPL-SCNC: 139 MMOL/L (ref 135–145)
TRIGL SERPL-MCNC: 276 MG/DL

## 2024-04-21 LAB
PSA FREE MFR SERPL: 26.18 %
PSA FREE SERPL-MCNC: 1.5 NG/ML
PSA SERPL DL<=0.01 NG/ML-MCNC: 5.73 NG/ML (ref 0–4.5)

## 2024-04-22 VITALS
RESPIRATION RATE: 18 BRPM | DIASTOLIC BLOOD PRESSURE: 76 MMHG | HEIGHT: 70 IN | TEMPERATURE: 98.5 F | WEIGHT: 254.2 LBS | BODY MASS INDEX: 36.39 KG/M2 | OXYGEN SATURATION: 99 % | HEART RATE: 68 BPM | SYSTOLIC BLOOD PRESSURE: 136 MMHG

## 2024-04-23 NOTE — PATIENT INSTRUCTIONS
"Please follow up if you have any further issues.    You may contact me if you are worsening or if things are not improving.    ______________________________________________________________________     How do I get a hold of Dr. Boyle or his team more directly?    One option is to leave me a [employer] message about your situation.  Another option is to call our Care Team coordinators.  These are 3 people in our [facility] taking phone calls.  They are available Monday - Friday 7 am - 6 pm.  You can call [phone] and when asked for a voice prompt, say \"CARE TEAM\" to get connected to them.  ______________________________________________________________________     What if I need an appointment with Dr. Boyle specifically?    Please remember that you can call [phone] ANYTIME to schedule an appointment.  With the voice prompt, say \"APPOINTMENTS.\"    You can schedule appointments 24 hours a day, 7 days a week.      Sometimes the best time to schedule an appointment is after clinic hours or on weekends when less people are calling in.      You can also schedule appointments through [employer].    ______________________________________________________________________     What if I need care more urgently?    If it is an emergency, do not wait for us to respond as it can sometimes take awhile.  Call 911 and go to the hospital.  A number of more urgent care options are available:  Walk-in Care at [place] or [place].  Open in [place] 10 am - 8 pm Monday-Friday.  9 am - 8 pm Saturday and Sunday.  Open in [place] 10 am - 8 pm Monday-Friday.  9 am - 5 pm Saturday and Sunday.  Earlier times tend to be less crowded.  Other local urgent care.  [facility]ency Room (in [facility] and [place]).  For more information, [email or web address].  All locations are open from 8am to 9pm daily.  Emergency room especially in the middle of the night.  If your problem is more orthopedic in nature (joint, spine or bone pain), " "there is orthopedic urgent care as well.  [facility] urgent care.    Available locally in [facility], [place] and [place].  Open 8 am to 8 pm every day.  [facility] urgent care.  Available in [place], [place] and [place].  Open 8 am to 8 pm every day.    ______________________________________________________________________     What if I just want some advice from a nurse?    We do have triage nurses available 24-7.  Call [phone] and when asked for a voice prompt, say \"TRIAGE NURSE\"     "

## 2024-05-01 ENCOUNTER — ANESTHESIA (OUTPATIENT)
Dept: GASTROENTEROLOGY | Facility: CLINIC | Age: 69
End: 2024-05-01
Payer: MEDICARE

## 2024-05-01 ENCOUNTER — HOSPITAL ENCOUNTER (OUTPATIENT)
Facility: CLINIC | Age: 69
Discharge: HOME | End: 2024-05-01
Attending: COLON & RECTAL SURGERY | Admitting: COLON & RECTAL SURGERY
Payer: MEDICARE

## 2024-05-01 ENCOUNTER — ANESTHESIA EVENT (OUTPATIENT)
Dept: GASTROENTEROLOGY | Facility: CLINIC | Age: 69
End: 2024-05-01
Payer: MEDICARE

## 2024-05-01 VITALS
OXYGEN SATURATION: 95 % | HEART RATE: 146 BPM | SYSTOLIC BLOOD PRESSURE: 125 MMHG | WEIGHT: 254 LBS | BODY MASS INDEX: 36.71 KG/M2 | DIASTOLIC BLOOD PRESSURE: 76 MMHG | RESPIRATION RATE: 14 BRPM

## 2024-05-01 LAB — COLONOSCOPY: NORMAL

## 2024-05-01 PROCEDURE — 45378 DIAGNOSTIC COLONOSCOPY: CPT | Performed by: ANESTHESIOLOGY

## 2024-05-01 PROCEDURE — 250N000009 HC RX 250: Performed by: NURSE ANESTHETIST, CERTIFIED REGISTERED

## 2024-05-01 PROCEDURE — 370N000017 HC ANESTHESIA TECHNICAL FEE, PER MIN: Performed by: COLON & RECTAL SURGERY

## 2024-05-01 PROCEDURE — 258N000003 HC RX IP 258 OP 636: Performed by: NURSE ANESTHETIST, CERTIFIED REGISTERED

## 2024-05-01 PROCEDURE — G0121 COLON CA SCRN NOT HI RSK IND: HCPCS | Performed by: COLON & RECTAL SURGERY

## 2024-05-01 PROCEDURE — 999N000010 HC STATISTIC ANES STAT CODE-CRNA PER MINUTE: Performed by: COLON & RECTAL SURGERY

## 2024-05-01 PROCEDURE — 45378 DIAGNOSTIC COLONOSCOPY: CPT | Performed by: COLON & RECTAL SURGERY

## 2024-05-01 PROCEDURE — 250N000011 HC RX IP 250 OP 636: Performed by: NURSE ANESTHETIST, CERTIFIED REGISTERED

## 2024-05-01 PROCEDURE — 45378 DIAGNOSTIC COLONOSCOPY: CPT | Performed by: NURSE ANESTHETIST, CERTIFIED REGISTERED

## 2024-05-01 RX ORDER — LIDOCAINE HYDROCHLORIDE 20 MG/ML
INJECTION, SOLUTION INFILTRATION; PERINEURAL PRN
Status: DISCONTINUED | OUTPATIENT
Start: 2024-05-01 | End: 2024-05-01

## 2024-05-01 RX ORDER — ERGOCALCIFEROL (VITAMIN D2) 10 MCG
TABLET ORAL
COMMUNITY

## 2024-05-01 RX ORDER — ONDANSETRON 2 MG/ML
INJECTION INTRAMUSCULAR; INTRAVENOUS PRN
Status: DISCONTINUED | OUTPATIENT
Start: 2024-05-01 | End: 2024-05-01

## 2024-05-01 RX ORDER — PROPOFOL 10 MG/ML
INJECTION, EMULSION INTRAVENOUS PRN
Status: DISCONTINUED | OUTPATIENT
Start: 2024-05-01 | End: 2024-05-01

## 2024-05-01 RX ORDER — PROPOFOL 10 MG/ML
INJECTION, EMULSION INTRAVENOUS CONTINUOUS PRN
Status: DISCONTINUED | OUTPATIENT
Start: 2024-05-01 | End: 2024-05-01

## 2024-05-01 RX ORDER — SODIUM CHLORIDE, SODIUM LACTATE, POTASSIUM CHLORIDE, CALCIUM CHLORIDE 600; 310; 30; 20 MG/100ML; MG/100ML; MG/100ML; MG/100ML
INJECTION, SOLUTION INTRAVENOUS CONTINUOUS PRN
Status: DISCONTINUED | OUTPATIENT
Start: 2024-05-01 | End: 2024-05-01

## 2024-05-01 RX ADMIN — PROPOFOL 30 MG: 10 INJECTION, EMULSION INTRAVENOUS at 13:17

## 2024-05-01 RX ADMIN — PROPOFOL 20 MG: 10 INJECTION, EMULSION INTRAVENOUS at 13:20

## 2024-05-01 RX ADMIN — ONDANSETRON 4 MG: 2 INJECTION INTRAMUSCULAR; INTRAVENOUS at 13:15

## 2024-05-01 RX ADMIN — LIDOCAINE HYDROCHLORIDE 50 MG: 20 INJECTION, SOLUTION INFILTRATION; PERINEURAL at 13:10

## 2024-05-01 RX ADMIN — PROPOFOL 200 MCG/KG/MIN: 10 INJECTION, EMULSION INTRAVENOUS at 13:11

## 2024-05-01 RX ADMIN — PROPOFOL 50 MG: 10 INJECTION, EMULSION INTRAVENOUS at 13:11

## 2024-05-01 RX ADMIN — DEXMEDETOMIDINE HYDROCHLORIDE 8 MCG: 100 INJECTION, SOLUTION INTRAVENOUS at 13:20

## 2024-05-01 RX ADMIN — PROPOFOL 20 MG: 10 INJECTION, EMULSION INTRAVENOUS at 13:27

## 2024-05-01 RX ADMIN — SODIUM CHLORIDE, POTASSIUM CHLORIDE, SODIUM LACTATE AND CALCIUM CHLORIDE: 600; 310; 30; 20 INJECTION, SOLUTION INTRAVENOUS at 13:08

## 2024-05-01 RX ADMIN — DEXMEDETOMIDINE HYDROCHLORIDE 12 MCG: 100 INJECTION, SOLUTION INTRAVENOUS at 13:11

## 2024-05-01 ASSESSMENT — ACTIVITIES OF DAILY LIVING (ADL)
ADLS_ACUITY_SCORE: 35

## 2024-05-01 NOTE — ANESTHESIA CARE TRANSFER NOTE
Patient: Cj Randhawa    Procedure: Procedure(s):  Colonoscopy       Diagnosis: Special screening for malignant neoplasms, colon [Z12.11]  Diagnosis Additional Information: No value filed.    Anesthesia Type:   MAC     Note:    Oropharynx: oropharynx clear of all foreign objects  Level of Consciousness: awake  Oxygen Supplementation: face mask    Independent Airway: airway patency satisfactory and stable  Dentition: dentition unchanged  Vital Signs Stable: post-procedure vital signs reviewed and stable  Report to RN Given: handoff report given  Patient transferred to: PACU    Handoff Report: Identifed the Patient, Identified the Reponsible Provider, Reviewed the pertinent medical history, Discussed the surgical course, Reviewed Intra-OP anesthesia mangement and issues during anesthesia, Set expectations for post-procedure period and Allowed opportunity for questions and acknowledgement of understanding  Vitals:  Vitals Value Taken Time   /78 05/01/24 1336   Temp     Pulse 35 05/01/24 1337   Resp 39 05/01/24 1338   SpO2 95 % 05/01/24 1338   Vitals shown include unfiled device data.    Electronically Signed By: АНДРЕЙ Reina CRNA  May 1, 2024  1:39 PM

## 2024-05-01 NOTE — ANESTHESIA POSTPROCEDURE EVALUATION
Patient: Cj Randhawa    Procedure: Procedure(s):  Colonoscopy       Anesthesia Type:  MAC    Note:     Postop Pain Control: Uneventful            Sign Out: Well controlled pain   PONV:    Neuro/Psych: Uneventful            Sign Out: Acceptable/Baseline neuro status   Airway/Respiratory: Uneventful            Sign Out: Acceptable/Baseline resp. status   CV/Hemodynamics: Uneventful            Sign Out: Acceptable CV status; No obvious hypovolemia; No obvious fluid overload   Other NRE:    DID A NON-ROUTINE EVENT OCCUR?            Last vitals:  Vitals Value Taken Time   /60 05/01/24 1411   Temp     Pulse 111 05/01/24 1411   Resp 13 05/01/24 1412   SpO2 94 % 05/01/24 1412   Vitals shown include unfiled device data.    Electronically Signed By: Alfonso Tuttle MD  May 1, 2024  5:12 PM

## 2024-05-01 NOTE — ANESTHESIA PREPROCEDURE EVALUATION
Anesthesia Pre-Procedure Evaluation    Patient: Cj Randhawa   MRN: [id number] : 1955        Procedure : Procedure(s):  Colonoscopy          Past Medical History:   Diagnosis Date    Alpha thalassemia trait     Created by [facility]  [employer] Annotation: May  7 2008 11:11AM - Destiny Pat: alpha   thalassemia   trait       Coronary artery disease     hilton score 148  mostly lad    Family history of myocardial infarction     pgf    Gout     High cholesterol     dislipidemia    Hyperlipidemia     Hypertension     Morbid obesity (H) 11/10/2017    Nephrolithiasis     Spinal stenosis in cervical region     C5 - C6 WITH LEFT C6 IMPINGEMENT       Thalassemia       Past Surgical History:   Procedure Laterality Date    COLONOSCOPY N/A 2019    Procedure: COLONOSCOPY with polypectomy;  Surgeon: Lara Harman MD;  Location: [facility];  Service: Gastroenterology    INGUINAL HERNIA REPAIR      REVISE SECONDARY VARICOSITY      Description: Varicose Vein Ligation;  Recorded: 2009;      Allergies   Allergen Reactions    Simvastatin Unknown    Spironolactone Other (See Comments)      Social History     Tobacco Use    Smoking status: Former     Current packs/day: 0.00     Types: Cigarettes     Quit date: 1988     Years since quittin.3    Smokeless tobacco: Never    Tobacco comments:     35 years ago   Substance Use Topics    Alcohol use: Yes     Alcohol/week: 12.0 standard drinks of alcohol      Wt Readings from Last 1 Encounters:   24 115.3 kg (254 lb 3.2 oz)        Anesthesia Evaluation            ROS/MED HX  ENT/Pulmonary:    (-) sleep apnea   Neurologic:       Cardiovascular: Comment: Hx SVT;    (+) Dyslipidemia hypertension- -  CAD -  - -      CHF etiology: Non-ischemic cardiomyopathy;            ICD                   METS/Exercise Tolerance:     Hematologic: Comments: Alpha thalassemia trait;      Musculoskeletal:       GI/Hepatic:    (-) GERD   Renal/Genitourinary:     (+)       " Nephrolithiasis ,       Endo:     (+)               Obesity,       Psychiatric/Substance Use:       Infectious Disease:       Malignancy:       Other:            Physical Exam    Airway        Mallampati: I   TM distance: > 3 FB   Neck ROM: full   Mouth opening: > 3 cm    Respiratory Devices and Support         Dental       (+) Modest Abnormalities - crowns, retainers, 1 or 2 missing teeth and Removable bridges or other hardware      Cardiovascular   cardiovascular exam normal          Pulmonary   pulmonary exam normal                OUTSIDE LABS:  CBC:   Lab Results   Component Value Date    WBC 8.3 04/19/2024    WBC 8.0 08/15/2023    HGB 13.0 (L) 04/19/2024    HGB 12.2 (L) 08/15/2023    HCT 41.4 04/19/2024    HCT 39.5 (L) 08/15/2023     04/19/2024     08/15/2023     BMP:   Lab Results   Component Value Date     04/19/2024     08/15/2023    POTASSIUM 4.7 04/19/2024    POTASSIUM 5.3 08/15/2023    CHLORIDE 104 04/19/2024    CHLORIDE 105 08/15/2023    CO2 25 04/19/2024    CO2 27 08/15/2023    BUN 15.1 04/19/2024    BUN 12.9 08/15/2023    CR 1.04 04/19/2024    CR 1.02 08/15/2023     (H) 04/19/2024     (H) 08/15/2023     COAGS: No results found for: \"PTT\", \"INR\", \"FIBR\"  POC: No results found for: \"BGM\", \"HCG\", \"HCGS\"  HEPATIC:   Lab Results   Component Value Date    ALBUMIN 4.4 04/19/2024    PROTTOTAL 7.0 04/19/2024    ALT 44 04/19/2024    AST 41 04/19/2024    ALKPHOS 57 04/19/2024    BILITOTAL 0.6 04/19/2024     OTHER:   Lab Results   Component Value Date    A1C 5.3 08/15/2022    KALPANA 9.6 04/19/2024    MAG 2.1 04/19/2024    TSH 2.00 08/20/2021    CRP 0.1 08/20/2021    SED 2 08/20/2021       Anesthesia Plan    ASA Status:  3    NPO Status:  NPO Appropriate    Anesthesia Type: MAC.              Consents    Anesthesia Plan(s) and associated risks, benefits, and realistic alternatives discussed. Questions answered and patient/representative(s) expressed understanding.     - " "Discussed:     - Discussed with:  Patient            Postoperative Care       PONV prophylaxis: Ondansetron (or other 5HT-3)     Comments:               CAROLINE JACOBS MD    I have reviewed the pertinent notes and labs in the chart from the past 30 days and (re)examined the patient.  Any updates or changes from those notes are reflected in this note.              # Obesity: Estimated body mass index is 36.74 kg/m  as calculated from the following:    Height as of 4/19/24: 1.772 m (5' 9.75\").    Weight as of 4/19/24: 115.3 kg (254 lb 3.2 oz).      "

## 2024-05-14 ENCOUNTER — TRANSFERRED RECORDS (OUTPATIENT)
Dept: HEALTH INFORMATION MANAGEMENT | Facility: CLINIC | Age: 69
End: 2024-05-14
Payer: MEDICARE

## 2024-05-20 ENCOUNTER — VIRTUAL VISIT (OUTPATIENT)
Dept: FAMILY MEDICINE | Facility: CLINIC | Age: 69
End: 2024-05-20
Payer: MEDICARE

## 2024-05-20 DIAGNOSIS — M10.072 IDIOPATHIC GOUT INVOLVING TOE OF LEFT FOOT, UNSPECIFIED CHRONICITY: Primary | ICD-10-CM

## 2024-05-20 PROCEDURE — 99213 OFFICE O/P EST LOW 20 MIN: CPT | Mod: 95

## 2024-05-20 RX ORDER — METHYLPREDNISOLONE 4 MG
TABLET, DOSE PACK ORAL
Qty: 21 TABLET | Refills: 0 | Status: SHIPPED | OUTPATIENT
Start: 2024-05-20 | End: 2024-09-10

## 2024-05-20 NOTE — PROGRESS NOTES
"Jhonny is a 68 year old who is being evaluated via a billable video visit.    How would you like to obtain your AVS? MyChart  If the video visit is dropped, the invitation should be resent by: Text to cell phone: [phone]  Will anyone else be joining your video visit? No      Assessment & Plan     Idiopathic gout involving toe of left foot, unspecified chronicity  HPI and physical exam through phone camera most likely indicate acute gout flare.  Prescribed Medrol pack, take as prescribed.  Discussed medication administration and possible side effects.  Kidney function WNL.    - methylPREDNISolone (MEDROL DOSEPAK) 4 MG tablet therapy pack; Follow Package Directions        BMI  Estimated body mass index is 36.71 kg/m  as calculated from the following:    Height as of 4/19/24: 1.772 m (5' 9.75\").    Weight as of 5/1/24: 115.2 kg (254 lb).   Weight management plan: Discussed healthy diet and exercise guidelines    Subjective   Jhonny is a 68 year old, presenting for the following health issues:  Follow Up (Gout flare up. Patient has missed some doses of the allopurinol)      5/20/2024    10:15 AM   Additional Questions   Roomed by Viky HERNANDEZ CMA     Video Start Time: 10:27 AM    History of Present Illness       Reason for visit:  Gout flare up  Symptom onset:  3-7 days ago  Symptoms include:  Painful swollen left toe/foot  Symptom intensity:  Severe  Symptom progression:  Staying the same  Had these symptoms before:  Yes  Has tried/received treatment for these symptoms:  Yes  Previous treatment was successful:  Yes  Prior treatment description:  Prednisone  What makes it worse:  Heat  What makes it better:  Ice pac    He eats 0-1 servings of fruits and vegetables daily.He consumes 1 sweetened beverage(s) daily.He exercises with enough effort to increase his heart rate 10 to 19 minutes per day.  He exercises with enough effort to increase his heart rate 3 or less days per week.   He is taking medications regularly.   "   Patient is 68 year old male presenting for video visit for acute gout flare.   Chronic gout managed with 300 mg allopurinol daily.  Missed some daily doses in the past two weeks due to hunting.  Reports swelling, redness, pain to left big toe.  Denies any injury or trauma to left foot.  Has not had acute gout flare up for 10 years as he is usually compliant with daily medication administration.  2023 uric acid 5.8.      Review of Systems  Review of systems negative otherwise known HPI.    Past Medical History:   Diagnosis Date    Alpha thalassemia trait     Created by [facility]  [employer] [facility] Annotation: May  7 2008 11:11AM - Destiny Pat: alpha   thalassemia   trait       Coronary artery disease     hilton score 148  mostly lad    Family history of myocardial infarction     pgf    Gout     High cholesterol     dislipidemia    Hyperlipidemia     Hypertension     Morbid obesity (H) 11/10/2017    Nephrolithiasis     Spinal stenosis in cervical region     C5 - C6 WITH LEFT C6 IMPINGEMENT       Thalassemia      Past Surgical History:   Procedure Laterality Date    COLONOSCOPY N/A 2019    Procedure: COLONOSCOPY with polypectomy;  Surgeon: Lara Harman MD;  Location: [facility];  Service: Gastroenterology    COLONOSCOPY N/A 2024    Procedure: Colonoscopy;  Surgeon: Chelsey Rees MD;  Location: [facility]    INGUINAL HERNIA REPAIR      REVISE SECONDARY VARICOSITY      Description: Varicose Vein Ligation;  Recorded: 2009;     Family History   Problem Relation Age of Onset    Kidney failure Mother     Diabetes Type 2  Mother     Gout Other     Lung Cancer Father      Social History     Tobacco Use    Smoking status: Former     Current packs/day: 0.00     Types: Cigarettes     Quit date: 1988     Years since quittin.3    Smokeless tobacco: Never    Tobacco comments:     35 years ago   Substance Use Topics    Alcohol use: Yes     Alcohol/week: 12.0 standard drinks of alcohol     Current  Outpatient Medications   Medication Sig Dispense Refill    allopurinol (ZYLOPRIM) 300 MG tablet Take 1 tablet (300 mg) by mouth daily 90 tablet 2    lisinopril (ZESTRIL) 20 MG tablet Take 20 mg by mouth daily      methylPREDNISolone (MEDROL DOSEPAK) 4 MG tablet therapy pack Follow Package Directions 21 tablet 0    metoprolol succinate ER (TOPROL XL) 50 MG 24 hr tablet Take 1 tablet by mouth daily      naproxen sodium (ALEVE) 220 MG tablet Take 220 mg by mouth daily      sildenafil (REVATIO) 20 MG tablet [SILDENAFIL (REVATIO) 20 MG TABLET] Two to four tabs as needed for erectile dysfunction 30 tablet 9    Vitamin D, Cholecalciferol, 10 MCG (400 UNIT) TABS        No current facility-administered medications for this visit.       Objective    Vitals - Patient Reported  Pain Score: Extreme Pain (8)  Pain Loc: Foot    Physical Exam   GENERAL: alert and no distress  EYES: Eyes grossly normal to inspection.  No discharge or erythema, or obvious scleral/conjunctival abnormalities.  RESP: No audible wheeze, cough, or visible cyanosis.    SKIN: no suspicious lesions or rashes and redness and swelling to left big toe.   NEURO: Cranial nerves grossly intact.  Mentation and speech appropriate for age.  PSYCH: Appropriate affect, tone, and pace of words      Video-Visit Details    Type of service:  Video Visit   Video End Time:10:32  Originating Location (pt. Location): Home    Distant Location (provider location):  On-site  Platform used for Video Visit: Reji  Signed Electronically by: АНДРЕЙ Powell CNP

## 2024-07-03 ENCOUNTER — TRANSFERRED RECORDS (OUTPATIENT)
Dept: HEALTH INFORMATION MANAGEMENT | Facility: CLINIC | Age: 69
End: 2024-07-03
Payer: MEDICARE

## 2024-07-27 ENCOUNTER — HOSPITAL ENCOUNTER (EMERGENCY)
Facility: HOSPITAL | Age: 69
Discharge: HOME | End: 2024-07-27
Admitting: PHYSICIAN ASSISTANT
Payer: MEDICARE

## 2024-07-27 ENCOUNTER — OFFICE VISIT (OUTPATIENT)
Dept: FAMILY MEDICINE | Facility: CLINIC | Age: 69
End: 2024-07-27
Payer: MEDICARE

## 2024-07-27 ENCOUNTER — APPOINTMENT (OUTPATIENT)
Dept: ULTRASOUND IMAGING | Facility: HOSPITAL | Age: 69
End: 2024-07-27
Attending: PHYSICIAN ASSISTANT
Payer: MEDICARE

## 2024-07-27 VITALS
HEIGHT: 70 IN | DIASTOLIC BLOOD PRESSURE: 61 MMHG | RESPIRATION RATE: 18 BRPM | SYSTOLIC BLOOD PRESSURE: 128 MMHG | BODY MASS INDEX: 35.98 KG/M2 | TEMPERATURE: 97.9 F | OXYGEN SATURATION: 99 % | WEIGHT: 251.32 LBS | HEART RATE: 77 BPM

## 2024-07-27 VITALS
TEMPERATURE: 98.7 F | OXYGEN SATURATION: 97 % | DIASTOLIC BLOOD PRESSURE: 86 MMHG | HEART RATE: 85 BPM | RESPIRATION RATE: 18 BRPM | SYSTOLIC BLOOD PRESSURE: 144 MMHG

## 2024-07-27 DIAGNOSIS — M79.89 LEFT LEG SWELLING: ICD-10-CM

## 2024-07-27 DIAGNOSIS — M79.652 PAIN OF LEFT THIGH: ICD-10-CM

## 2024-07-27 DIAGNOSIS — M79.662 PAIN OF LEFT CALF: ICD-10-CM

## 2024-07-27 DIAGNOSIS — M79.662 PAIN OF LEFT LOWER LEG: Primary | ICD-10-CM

## 2024-07-27 DIAGNOSIS — R93.89 ABNORMAL ULTRASOUND: ICD-10-CM

## 2024-07-27 DIAGNOSIS — L03.116 CELLULITIS OF LEFT LOWER EXTREMITY: ICD-10-CM

## 2024-07-27 LAB
ANION GAP SERPL CALCULATED.3IONS-SCNC: 8 MMOL/L (ref 7–15)
BUN SERPL-MCNC: 16.3 MG/DL (ref 8–23)
CALCIUM SERPL-MCNC: 9.1 MG/DL (ref 8.8–10.4)
CHLORIDE SERPL-SCNC: 100 MMOL/L (ref 98–107)
CREAT SERPL-MCNC: 1.08 MG/DL (ref 0.67–1.17)
CRP SERPL-MCNC: 96 MG/L
EGFRCR SERPLBLD CKD-EPI 2021: 75 ML/MIN/1.73M2
ERYTHROCYTE [DISTWIDTH] IN BLOOD BY AUTOMATED COUNT: 18 % (ref 10–15)
GLUCOSE SERPL-MCNC: 101 MG/DL (ref 70–99)
HCO3 SERPL-SCNC: 26 MMOL/L (ref 22–29)
HCT VFR BLD AUTO: 39.6 % (ref 40–53)
HGB BLD-MCNC: 12.2 G/DL (ref 13.3–17.7)
LACTATE SERPL-SCNC: 0.8 MMOL/L (ref 0.7–2)
MCH RBC QN AUTO: 19.4 PG (ref 26.5–33)
MCHC RBC AUTO-ENTMCNC: 30.8 G/DL (ref 31.5–36.5)
MCV RBC AUTO: 63 FL (ref 78–100)
PLATELET # BLD AUTO: 140 10E3/UL (ref 150–450)
POTASSIUM SERPL-SCNC: 4.6 MMOL/L (ref 3.4–5.3)
RBC # BLD AUTO: 6.28 10E6/UL (ref 4.4–5.9)
SODIUM SERPL-SCNC: 134 MMOL/L (ref 135–145)
WBC # BLD AUTO: 9.7 10E3/UL (ref 4–11)

## 2024-07-27 PROCEDURE — 93971 EXTREMITY STUDY: CPT | Mod: LT

## 2024-07-27 PROCEDURE — 250N000011 HC RX IP 250 OP 636: Performed by: PHYSICIAN ASSISTANT

## 2024-07-27 PROCEDURE — 86140 C-REACTIVE PROTEIN: CPT | Performed by: PHYSICIAN ASSISTANT

## 2024-07-27 PROCEDURE — 83605 ASSAY OF LACTIC ACID: CPT | Performed by: PHYSICIAN ASSISTANT

## 2024-07-27 PROCEDURE — 96365 THER/PROPH/DIAG IV INF INIT: CPT | Performed by: PHYSICIAN ASSISTANT

## 2024-07-27 PROCEDURE — 36415 COLL VENOUS BLD VENIPUNCTURE: CPT | Performed by: PHYSICIAN ASSISTANT

## 2024-07-27 PROCEDURE — 99285 EMERGENCY DEPT VISIT HI MDM: CPT | Mod: 25 | Performed by: PHYSICIAN ASSISTANT

## 2024-07-27 PROCEDURE — 80048 BASIC METABOLIC PNL TOTAL CA: CPT | Performed by: PHYSICIAN ASSISTANT

## 2024-07-27 PROCEDURE — 99215 OFFICE O/P EST HI 40 MIN: CPT | Performed by: NURSE PRACTITIONER

## 2024-07-27 PROCEDURE — 85027 COMPLETE CBC AUTOMATED: CPT | Performed by: PHYSICIAN ASSISTANT

## 2024-07-27 RX ORDER — CEFTRIAXONE 2 G/1
2 INJECTION, POWDER, FOR SOLUTION INTRAMUSCULAR; INTRAVENOUS ONCE
Status: COMPLETED | OUTPATIENT
Start: 2024-07-27 | End: 2024-07-27

## 2024-07-27 RX ORDER — CEPHALEXIN 500 MG/1
500 CAPSULE ORAL 4 TIMES DAILY
Qty: 40 CAPSULE | Refills: 0 | Status: SHIPPED | OUTPATIENT
Start: 2024-07-27 | End: 2024-08-06

## 2024-07-27 RX ADMIN — CEFTRIAXONE SODIUM 2 G: 2 INJECTION, POWDER, FOR SOLUTION INTRAMUSCULAR; INTRAVENOUS at 14:02

## 2024-07-27 ASSESSMENT — ACTIVITIES OF DAILY LIVING (ADL)
ADLS_ACUITY_SCORE: 35

## 2024-07-27 ASSESSMENT — COLUMBIA-SUICIDE SEVERITY RATING SCALE - C-SSRS
1. IN THE PAST MONTH, HAVE YOU WISHED YOU WERE DEAD OR WISHED YOU COULD GO TO SLEEP AND NOT WAKE UP?: NO
6. HAVE YOU EVER DONE ANYTHING, STARTED TO DO ANYTHING, OR PREPARED TO DO ANYTHING TO END YOUR LIFE?: NO
2. HAVE YOU ACTUALLY HAD ANY THOUGHTS OF KILLING YOURSELF IN THE PAST MONTH?: NO

## 2024-07-27 NOTE — ED NOTES
Mega to [facility] Pt from . Here for US of LLE. Has some swelling and redness. Redness started yesterday. Denies sob, fever or chills. Has not been on a plane or driven a long distance. Recently got over the flu

## 2024-07-27 NOTE — ED TRIAGE NOTES
Pt presents to triage ambulatory from urgent care. Pt reports redness, swelling, and tenderness of left calf that started a few days ago. Yesterday a red/swollen/tender area appeared on his left thigh as well. No known injury. Pt is not diabetic, no blood thinners. History of gout.     Triage Assessment (Adult)       Row Name 07/27/24 1104          Triage Assessment    Airway WDL WDL        Respiratory WDL    Respiratory WDL WDL        Skin Circulation/Temperature WDL    Skin Circulation/Temperature WDL X        Cardiac WDL    Cardiac WDL WDL        Peripheral/Neurovascular WDL    Peripheral Neurovascular WDL WDL        Cognitive/Neuro/Behavioral WDL    Cognitive/Neuro/Behavioral WDL WDL

## 2024-07-27 NOTE — ED PROVIDER NOTES
Emergency Department Encounter   NAME: Cj Randhawa ; AGE: 69 year old male ; YOB: 1955 ; MRN: [id number] ; PCP: Paramjit Boyle   ED PROVIDER: Luana Merritt PA-C    Evaluation Date & Time:   7/27/2024 11:22 AM    CHIEF COMPLAINT:  Leg Pain and Leg Swelling      Impression and Plan   MDM: Cj Randhawa is a 69 year old male who presents to the ED for evaluation of left leg pain.  The patient presents to the emergency department for evaluation of 1 week of left ankle/lateral shin pain with that has been now tracking up his medial calf and inner thigh this morning with associated subjective fevers and chills several days ago.  He was initially seen at urgent care, note reviewed per chart review, referred to the ED due to concerns for DVT.  Here in the ED, he is afebrile and vitally stable.  This is a well-appearing patient who is non-toxic.  He has patchy erythema over his left medial thigh and overlying lateral distal shin ankle and foot that is warm and tender to the touch and is clinically consistent with a cellulitis.  He does have tenderness and swelling to the medial calf as well, DVT is certainly on the differential.  Ultrasound of the lower extremity ordered.  Will also further assess with laboratory workup.    Ultrasound negative for DVT/superficial thrombophlebitis.  Likely reactive left inguinal lymph node consistent with cellulitic infection.  He does have a small minimally complex fluid collection in the medial Soft tissues.  I discussed this finding with radiologist, and there is no surrounding inflammatory changes to suggest abscess.  Area is quite small, he does have a history of excised melanoma to this calf.  I discussed this abnormality with patient at length.  Advised very close follow-up given his cancer history, and he will connect with his primary care provider on Monday to likely have timely repeat imaging.  Afebrile vitally stable, no leukocytosis or elevated lactate.  He  is not showing any signs of sepsis.  At rest, he is currently states he is not having any pain, and without pain out of proportion to exam, crepitus, or elevated lactate very low suspicion for sinister infection such as necrotizing fasciitis.  CRP significantly elevated consistent with his infectious/inflammatory process.  At this time, discussed observation admission for IV antibiotics versus discharge on p.o. antibiotics with patient at length.  He does have erythema up to the groin, though the majority of the leg is not affected, and he is clinically well-appearing, vitally stable, and his labs are reassuring.  We discussed that it would be reasonable to trial p.o. antibiotics with.  Close recheck with his primary care provider on Monday or Tuesday and with any worsening symptoms or erythema extending outside of the margins that he would need to return to the ED immediately for IV antibiotics.  Additionally, offered him admission for IV antibiotics upfront to make sure this is improving prior to discharge to home.  Patient would prefer discharge and trialing p.o. antibiotics before being hospitalized which I feel is reasonable.  He was given a dose of Rocephin here in the emergency department and will be discharged home on Keflex.  No MRSA/MSSA history.  He declined prescription for narcotics for home.  Reviewed concerning signs and symptoms return to the ED at length and he verbalized understanding.  Discharged in good condition.    Medical Decision Making  Obtained supplemental history:Supplemental history obtained?: No  Reviewed external records: External records reviewed?: Outpatient Record:  visit from today  Care impacted by chronic illness:Cancer/Chemotherapy and Hypertension  Care significantly affected by social determinants of health:N/A  Did you consider but not order tests?: Work up considered but not performed and documented in chart, if applicable  Did you interpret images independently?:  Independent interpretation of ECG and images noted in documentation, when applicable.  Consultation discussion with other provider:Did you involve another provider (consultant, MH, pharmacy, etc.)?: I discussed the care with another health care provider, see documentation for details.  Offered admission, patient declined. Discharged with prescription strength meds.     ED COURSE:  11:35 AM I met and introduced myself to the patient. I gathered initial history and performed my physical exam. We discussed plan for initial workup.   1:30 PM I discussed the case with Dr. De Los Santos, ED MD.   1:52 PM I spoke with Dr. Woody, radiologist, about US read.  1:54 PM I rechecked the patient and discussed results, discharge, follow up, and reasons to return to the ED.     At the conclusion of the encounter I discussed the results of all the tests and the disposition. The questions were answered. The patient or family acknowledged understanding and was agreeable with the care plan.    FINAL IMPRESSION:    ICD-10-CM    1. Cellulitis of left lower extremity  L03.116       2. Abnormal ultrasound  R93.89             MEDICATIONS GIVEN IN THE EMERGENCY DEPARTMENT:  Medications   cefTRIAXone (ROCEPHIN) 2 g vial to attach to  ml bag for ADULTS or NS 50 ml bag for PEDS (0 g Intravenous Stopped 7/27/24 1438)         NEW PRESCRIPTIONS STARTED AT TODAY'S ED VISIT:  Discharge Medication List as of 7/27/2024  2:39 PM        START taking these medications    Details   cephALEXin (KEFLEX) 500 MG capsule Take 1 capsule (500 mg) by mouth 4 times daily for 10 days, Disp-40 capsule, R-0, E-Prescribe               HPI   Use of Intrepreter: N/A     Cj Randhawa is a 69 year old male with a pertinent history of thalassemia trait, coronary artery disease, gout, hypercholesteremia, hyperlipidemia, hypertension, morbid obesity, who presents to the ED by private vehicle for evaluation of left leg pain.    Per patient, he noticed some redness and pain  "to his left ankle about a week ago.  Wednesday night he states that he had the \"flu\", and felt feverish and chilled.  Took a negative home COVID test.  For the past 2 to 3 days, he has had increasing pain, some swelling, and now has a new patch of redness to his left inner thigh this morning.  He states that his pain is currently mild, and is more uncomfortable with movement or bearing weight on the leg.  No significant pain to the ankle joint.  Denies chest pain, shortness of breath, dizziness, palpitations and syncope.  No previous history of DVT or PE.  No recent long car rides or airplane trips, but does drive frequently for work though gets out hourly and moves around.  He did have a skin cancer removed from his right calf about 4 weeks ago, was placed on 10 days of antibiotics and is healing well.  No wounds or injuries to the left leg or change in activity that he can think of.  He has had varicose veins stripped of both of his legs in the past.      REVIEW OF SYSTEMS:  Pertinent positive and negative symptoms per HPI.       Medical History     Past Medical History:   Diagnosis Date    Alpha thalassemia trait     Coronary artery disease     Family history of myocardial infarction     Gout     High cholesterol     Hyperlipidemia     Hypertension     Morbid obesity (H) 11/10/2017    Nephrolithiasis     Spinal stenosis in cervical region     Thalassemia        Past Surgical History:   Procedure Laterality Date    COLONOSCOPY N/A 2/26/2019    Procedure: COLONOSCOPY with polypectomy;  Surgeon: Lara Harman MD;  Location: [facility];  Service: Gastroenterology    COLONOSCOPY N/A 5/1/2024    Procedure: Colonoscopy;  Surgeon: Chelsey Rees MD;  Location: [facility]    INGUINAL HERNIA REPAIR      REVISE SECONDARY VARICOSITY      Description: Varicose Vein Ligation;  Recorded: 05/19/2009;       Family History   Problem Relation Age of Onset    Kidney failure Mother     Diabetes Type 2  Mother     Gout " "Other     Lung Cancer Father        Social History     Tobacco Use    Smoking status: Former     Current packs/day: 0.00     Types: Cigarettes     Quit date: 1988     Years since quittin.5    Smokeless tobacco: Never    Tobacco comments:     35 years ago   Vaping Use    Vaping status: Never Used   Substance Use Topics    Alcohol use: Yes     Alcohol/week: 12.0 standard drinks of alcohol    Drug use: Yes     Types: Marijuana       cephALEXin (KEFLEX) 500 MG capsule  allopurinol (ZYLOPRIM) 300 MG tablet  lisinopril (ZESTRIL) 20 MG tablet  methylPREDNISolone (MEDROL DOSEPAK) 4 MG tablet therapy pack  metoprolol succinate ER (TOPROL XL) 50 MG 24 hr tablet  naproxen sodium (ALEVE) 220 MG tablet  sildenafil (REVATIO) 20 MG tablet  Vitamin D, Cholecalciferol, 10 MCG (400 UNIT) TABS          Physical Exam     First Vitals:  Patient Vitals for the past 24 hrs:   BP Temp Temp src Pulse Resp SpO2 Height Weight   24 1445 128/61 -- -- 77 18 99 % -- --   24 1430 130/69 -- -- 78 -- 98 % -- --   24 1330 131/74 -- -- 79 -- 99 % -- --   24 1254 124/68 -- -- 74 -- 98 % -- --   24 1207 -- -- -- 72 -- 98 % -- --   24 1152 132/74 -- -- -- -- -- -- --   24 1102 132/77 97.9  F (36.6  C) Temporal 84 16 97 % 1.778 m (5' 10\") 114 kg (251 lb 5.2 oz)         PHYSICAL EXAM:   Physical Exam  Vitals reviewed.   Constitutional:       General: He is not in acute distress.     Appearance: He is not ill-appearing, toxic-appearing or diaphoretic.   Pulmonary:      Effort: Pulmonary effort is normal.   Musculoskeletal:      Comments: 2+ DP and PT pulses and extremity is warm and well-perfused.  Freely ranging the left ankle without any significant pain or palpable effusion.  He does have erythema along his left lateral foot, ankle, and distal shin.  He has tenderness and swelling over the medial calf and then tenderness, erythema, and edema to his left medial thigh.  No crepitus, skin necrosis, or " palpable abscess.  No pain out of proportion to exam.  Does have varicose veins present bilaterally.  Positive squeeze test.   Neurological:      Mental Status: He is alert.             Results     LAB:  All pertinent labs reviewed and interpreted  Labs Ordered and Resulted from Time of ED Arrival to Time of ED Departure   CBC WITH PLATELETS - Abnormal       Result Value    WBC Count 9.7      RBC Count 6.28 (*)     Hemoglobin 12.2 (*)     Hematocrit 39.6 (*)     MCV 63 (*)     MCH 19.4 (*)     MCHC 30.8 (*)     RDW 18.0 (*)     Platelet Count 140 (*)    BASIC METABOLIC PANEL - Abnormal    Sodium 134 (*)     Potassium 4.6      Chloride 100      Carbon Dioxide (CO2) 26      Anion Gap 8      Urea Nitrogen 16.3      Creatinine 1.08      GFR Estimate 75      Calcium 9.1      Glucose 101 (*)    CRP INFLAMMATION - Abnormal    CRP Inflammation 96.00 (*)    LACTIC ACID WHOLE BLOOD - Normal    Lactic Acid 0.8         RADIOLOGY:  US Lower Extremity Venous Duplex Left   Final Result   IMPRESSION:   1.  No deep venous thrombosis in the left lower extremity.   2.  Small minimally complex fluid collection in the medial calf soft tissues. Recommend follow-up in 2-3 months to ensure resolution. If this persists or increases in size at that time an MRI should be considered.   3.  Likely reactive left inguinal lymph node also be followed up on subsequent exam.            Luana Merritt PA-C   Emergency Medicine   [facility] EMERGENCY DEPARTMENT       Luana Merritt PA-C  07/27/24 2800

## 2024-07-27 NOTE — DISCHARGE INSTRUCTIONS
As we discussed, your pain and redness to your left leg is consistent with a skin infection called cellulitis.  We will place you on the antibiotic Keflex which you should take as prescribed.  If at any point you develop return of fever, worsening pain, redness extending outside of the margins, or feel more ill you must return to the ER for IV antibiotics.  I would like you to have a really close recheck with your primary care provider on Monday or Tuesday to make sure this is improving.    As we discussed, your ultrasound showed a small fluid collection of uncertain etiology.  Please discuss this with your primary care provider as they will likely want to repeat imaging.

## 2024-07-27 NOTE — PATIENT INSTRUCTIONS
Go to emergency room for further evaluation of worsening left upper and lower leg redness, swelling, pain, warmth. Concern for DVT vs cellulitis. History of gout

## 2024-07-27 NOTE — PROGRESS NOTES
"Assessment & Plan     Pain of left lower leg      Pain of left thigh      Pain of left calf      Left leg swelling       Recommend further evaluation in emergency room for left leg swelling, erythema, pain as cannot rule out DVT in urgent care which can be life threatening. Patient agreeable and declines ambulance. He is discharged in stable condition.         Lupis Huff NP  [facility] EDUARDO Barry is a 69 year old male who presents to clinic today for the following health issues:  Chief Complaint   Patient presents with    Musculoskeletal Problem     3 days left leg pain,swelling and tightness.     MS Injury/Pain    Onset of symptoms was 3 day(s) ago.  Location: left leg- left medial thigh and left lower leg into ankle  Context: No known injury  Course of symptoms is worsening.    Severity severe  Current and Associated symptoms: Pain, Swelling, Warmth, and Redness  Denies  Bruising  Aggravating Factors: walking, weight-bearing, and movement  He states he had the \"flu\" recently with body aches and had been taking ibuprofen - last a couple days ago which helped. He had been feeling feverish. He tested negative for COVID.   Denies chest pain, shortness of breath. Feels like left calf is going to \"explode\"  He recently had procedure to remove spot on right calf and was on antibiotics and redness has been improving on right calf.   He is concerned about possible blood clot. No recent diet or alcohol changes.     He has a history of HTN, CAD, ICD placement, morbid obesity, gout. Does not feel like gout flare.     Problem list, Medication list, Allergies, and Medical history reviewed in EPIC.    ROS:  Review of systems negative except for noted above        Objective    BP (!) 144/86   Pulse 85   Temp 98.7  F (37.1  C)   Resp 18   SpO2 97%   Physical Exam  Constitutional:       General: He is not in acute distress.     Appearance: He is not toxic-appearing or diaphoretic. "   Cardiovascular:      Rate and Rhythm: Normal rate and regular rhythm.      Heart sounds: Normal heart sounds.   Pulmonary:      Effort: Pulmonary effort is normal. No respiratory distress.      Breath sounds: Normal breath sounds. No wheezing, rhonchi or rales.   Musculoskeletal:      Left lower leg: Edema present.      Left ankle: Swelling present. Decreased range of motion.      Comments: Left calf tenderness   Skin:     Findings: Erythema present.      Comments: Approx 5 inches erythema left medial thigh with increased warmth. Diffuse erythema left lower leg into ankle with moderate swelling. Healing incision right calf with mild erythema   Neurological:      Mental Status: He is alert.      Gait: Gait abnormal.      Comments: Walking with a limp

## 2024-07-29 ENCOUNTER — PATIENT OUTREACH (OUTPATIENT)
Dept: INTERNAL MEDICINE | Facility: CLINIC | Age: 69
End: 2024-07-29
Payer: MEDICARE

## 2024-07-29 NOTE — TELEPHONE ENCOUNTER
Reason for Call:  Appointment Request    Patient requesting this type of appt:  Hospital/ED Follow-Up     Requested provider: Paramjit Boyle    Reason patient unable to be scheduled: Not within requested timeframe    When does patient want to be seen/preferred time: 1-2 days    Comments: patient doing well     Could [facility] or would you prefer to receive a phone call?:   Patient would prefer a phone call   Okay to leave a detailed message?: Yes at Home number on file [phone] (home)    Call taken on 7/29/2024 at 9:17 AM by Kaylee Roberts

## 2024-07-29 NOTE — TELEPHONE ENCOUNTER
Pt has been scheduled for this.       Closing encounter.       Edd Veloz Jr., CMA on 7/29/2024 at 1:09 PM

## 2024-07-29 NOTE — TELEPHONE ENCOUNTER
LMTCB       Calling to get pt in for an ED F/U. When pt calls back, please assist in scheduling appt.       Thank you,  Edd Veloz Jr., CMA on 7/29/2024 at 10:49 AM     detailed exam conjunctiva clear/EOMI

## 2024-08-01 ENCOUNTER — OFFICE VISIT (OUTPATIENT)
Dept: INTERNAL MEDICINE | Facility: CLINIC | Age: 69
End: 2024-08-01
Payer: MEDICARE

## 2024-08-01 VITALS
HEIGHT: 70 IN | TEMPERATURE: 98.4 F | SYSTOLIC BLOOD PRESSURE: 120 MMHG | HEART RATE: 69 BPM | BODY MASS INDEX: 36.2 KG/M2 | DIASTOLIC BLOOD PRESSURE: 58 MMHG | OXYGEN SATURATION: 97 % | RESPIRATION RATE: 20 BRPM | WEIGHT: 252.9 LBS

## 2024-08-01 DIAGNOSIS — L03.116 LEFT LEG CELLULITIS: Primary | ICD-10-CM

## 2024-08-01 DIAGNOSIS — Z85.820 HISTORY OF MALIGNANT MELANOMA: ICD-10-CM

## 2024-08-01 DIAGNOSIS — I10 PRIMARY HYPERTENSION: Chronic | ICD-10-CM

## 2024-08-01 DIAGNOSIS — Z13.220 LIPID SCREENING: ICD-10-CM

## 2024-08-01 DIAGNOSIS — M10.9 GOUTY ARTHROPATHY: ICD-10-CM

## 2024-08-01 DIAGNOSIS — Z12.5 SCREENING FOR PROSTATE CANCER: ICD-10-CM

## 2024-08-01 DIAGNOSIS — R59.0 LYMPHADENOPATHY, INGUINAL: ICD-10-CM

## 2024-08-01 DIAGNOSIS — E66.01 MORBID OBESITY (H): ICD-10-CM

## 2024-08-01 PROCEDURE — G2211 COMPLEX E/M VISIT ADD ON: HCPCS | Performed by: INTERNAL MEDICINE

## 2024-08-01 PROCEDURE — 99214 OFFICE O/P EST MOD 30 MIN: CPT | Performed by: INTERNAL MEDICINE

## 2024-08-01 NOTE — PROGRESS NOTES
[facility]    Comprehensive and complex medical care - Chronic disease management - Shared decision making - Care coordination - Compassionate care    Patient advocacy - Rational deprescribing - Minimally disruptive medicine - Ethical focus - Customized care         Date of Service: 8/1/2024  Primary Provider: Paramjit Boyle    Patient Care Team:  Paramjit Boyle MD as PCP - General (Internal Medicine)  Chris Brandt MD as MD (Dermatology)  Francis Cruz MD as MD (Cardiovascular Disease)  Paramjit Boyle MD as Assigned PCP          Patient's Pharmacy:    [id number] [place] [facility] - [address] [address]  Phone: [phone] Fax: [phone]     Patient's Contacts:  Name Home Phone Work Phone Mobile Phone Relationship Lgl Grd   SILVINO INGRAM [phone] Spouse    PABLITO INGRAM [phone]   Other      Patient's Insurance:    Payor: MEDICARE / Plan: MEDICARE / Product Type: Medicare /           Active Problem List:  Problem List as of 8/1/2024 Reviewed: 7/27/2024 10:23 AM by Lupis Huff NP         High    Former smoker    ED (erectile dysfunction)       Medium    Primary hypertension    Biventricular ICD (implantable cardioverter-defibrillator) in place    Alpha thalassemia trait    Non-ischemic cardiomyopathy (H)    History of malignant melanoma    Elevated prostate specific antigen (PSA)    SVT (supraventricular tachycardia) (H24)       Low    Spinal stenosis in cervical region    Mixed hyperlipidemia    Nephrolithiasis    Gout    Tubular adenoma of colon - last colonoscopy 2019    Morbid obesity (H)    Spinal stenosis of lumbar region with neurogenic claudication        Current Outpatient Medications   Medication Instructions    allopurinol (ZYLOPRIM) 300 mg, Oral, DAILY    cephALEXin (KEFLEX) 500 mg, Oral, 4 TIMES DAILY    lisinopril (ZESTRIL) 20 mg, Oral, DAILY    methylPREDNISolone (MEDROL  DOSEPAK) 4 MG tablet therapy pack Follow Package Directions    metoprolol succinate ER (TOPROL XL) 50 MG 24 hr tablet 1 tablet, Oral, DAILY    naproxen sodium (ANAPROX) 220 mg, Oral, DAILY    sildenafil (REVATIO) 20 MG tablet [SILDENAFIL (REVATIO) 20 MG TABLET] Two to four tabs as needed for erectile dysfunction    Vitamin D, Cholecalciferol, 10 MCG (400 UNIT) TABS Oral     Social History     Social History Narrative    He works for [employer] as a manager.  He is  and has 3 children and 3 grandchildren.       Subjective:     Cj Randhawa is a 69 year old male who comes in today for:    Chief Complaint   Patient presents with    Hospital F/U     07/27/24 8/1/2024     4:10 PM   Additional Questions   Roomed by Saul Butler     Patient comes in today for follow-up after ER visit.    He presented there with left leg redness pain and swelling.  He had been having this for a few days prior to this.  It was mainly in the ankle he was noticing swelling and pain but this extended up to his medial calf and thigh with redness.  He had a couple days of fevers and chills preceding this.  He had no issues with his knee before this.  He has had melanoma in this leg that was resected in 2016.  No recent scratches or breaks in the skin.  No major toenail abnormalities.  He has improved a lot over the last 3 to 4 days on antibiotics.  He is on Keflex.  He denies any major side effects.    It hurt to walk on it on Saturday and Sunday but he can walk on it now without any issues.    He mentions his joint pains feeling better on the antibiotics.  He denies any inflammatory symptoms otherwise such as morning stiffness.    His ultrasound showed an inguinal lymph node and also a fluid collection in the medial calf and this was reviewed with him today.    We reviewed his other issues noted in the assessment but not specifically addressed in the HPI above.     Objective:     Wt Readings from Last 3 Encounters:  "  08/01/24 114.7 kg (252 lb 14.4 oz)   07/27/24 114 kg (251 lb 5.2 oz)   05/01/24 115.2 kg (254 lb)     BP Readings from Last 3 Encounters:   08/01/24 120/58   07/27/24 128/61   07/27/24 (!) 144/86     /58   Pulse 69   Temp 98.4  F (36.9  C) (Oral)   Resp 20   Ht 1.778 m (5' 10\")   Wt 114.7 kg (252 lb 14.4 oz)   SpO2 97%   BMI 36.29 kg/m     The patient is comfortable, no acute distress.  Mood good.  Insight good.  Heart regular rate and rhythm.  Lungs clear to auscultation bilaterally.  Respiratory effort is good.  Left leg shows improved medial leg cellulitis extending from the calf to the medial upper leg about half the way up.  The areas of redness previously described are more pink and the extent has reduced based on the markings on the leg.  There is 1+ edema of the ankle.  His gait is good at this time.      Diagnostics:     Admission on 07/27/2024, Discharged on 07/27/2024   Component Date Value Ref Range Status    WBC Count 07/27/2024 9.7  4.0 - 11.0 10e3/uL Final    RBC Count 07/27/2024 6.28 (H)  4.40 - 5.90 10e6/uL Final    Hemoglobin 07/27/2024 12.2 (L)  13.3 - 17.7 g/dL Final    Hematocrit 07/27/2024 39.6 (L)  40.0 - 53.0 % Final    MCV 07/27/2024 63 (L)  78 - 100 fL Final    MCH 07/27/2024 19.4 (L)  26.5 - 33.0 pg Final    MCHC 07/27/2024 30.8 (L)  31.5 - 36.5 g/dL Final    RDW 07/27/2024 18.0 (H)  10.0 - 15.0 % Final    Platelet Count 07/27/2024 140 (L)  150 - 450 10e3/uL Final    Sodium 07/27/2024 134 (L)  135 - 145 mmol/L Final    Potassium 07/27/2024 4.6  3.4 - 5.3 mmol/L Final    Chloride 07/27/2024 100  98 - 107 mmol/L Final    Carbon Dioxide (CO2) 07/27/2024 26  22 - 29 mmol/L Final    Anion Gap 07/27/2024 8  7 - 15 mmol/L Final    Urea Nitrogen 07/27/2024 16.3  8.0 - 23.0 mg/dL Final    Creatinine 07/27/2024 1.08  0.67 - 1.17 mg/dL Final    GFR Estimate 07/27/2024 75  >60 mL/min/1.73m2 Final    eGFR calculated using 2021 CKD-EPI equation.    Calcium 07/27/2024 9.1  8.8 - 10.4 mg/dL " Final    Reference intervals for this test were updated on 7/16/2024 to reflect our healthy population more accurately. There may be differences in the flagging of prior results with similar values performed with this method. Those prior results can be interpreted in the context of the updated reference intervals.    Glucose 07/27/2024 101 (H)  70 - 99 mg/dL Final    CRP Inflammation 07/27/2024 96.00 (H)  <5.00 mg/L Final    Lactic Acid 07/27/2024 0.8  0.7 - 2.0 mmol/L Final       No results found for any visits on 08/01/24.    Assessment:     1. Left leg cellulitis    2. Gout    3. Primary hypertension    4. Morbid obesity (H)    5. Lymphadenopathy, inguinal    6. History of malignant melanoma    7. Screening for prostate cancer    8. Lipid screening        Plan:     Blood work ordered for future annual wellness visit.  Complete course of antibiotics.  Notify us if side effects.  He had gout recently when he was off of allopurinol for a while.  Follow-up on his leg at the next visit.  Follow-up PSA with history of elevated PSA.  Consider follow-up ultrasound if residual problems.  Continue current medications otherwise.  Follow up sooner if issues.    Orders Placed This Encounter   Procedures    CBC with platelets    Basic metabolic panel    CRP inflammation    Uric acid    Prostate Specific Antigen Screen    Lipid panel reflex to direct LDL Fasting           Paramjit Boyle MD  General Internal Medicine  [facility]    The longitudinal plan of care for the diagnoses and conditions as documented were addressed during this visit. Due to the added complexity in care, I will continue to support Jhonny in the subsequent management and with ongoing continuity of care.     No follow-ups on file.     Future Appointments   Date Time Provider Department Center   9/3/2024  8:15 AM [facility]   9/10/2024  7:30 [facility]

## 2024-08-01 NOTE — PATIENT INSTRUCTIONS
Future Appointments   Date Time Provider Department Center   9/3/2024  8:15 AM [facility]   9/10/2024  7:30 AM Paramjit Boyle MD [facility]

## 2024-09-02 DIAGNOSIS — E79.0 HYPERURICEMIA: ICD-10-CM

## 2024-09-02 RX ORDER — ALLOPURINOL 300 MG/1
1 TABLET ORAL DAILY
Qty: 90 TABLET | Refills: 3 | Status: SHIPPED | OUTPATIENT
Start: 2024-09-02

## 2024-09-03 ENCOUNTER — LAB (OUTPATIENT)
Dept: LAB | Facility: CLINIC | Age: 69
End: 2024-09-03
Payer: MEDICARE

## 2024-09-03 DIAGNOSIS — Z13.220 LIPID SCREENING: ICD-10-CM

## 2024-09-03 DIAGNOSIS — Z12.5 SCREENING FOR PROSTATE CANCER: ICD-10-CM

## 2024-09-03 DIAGNOSIS — M10.9 GOUTY ARTHROPATHY: ICD-10-CM

## 2024-09-03 LAB
ANION GAP SERPL CALCULATED.3IONS-SCNC: 10 MMOL/L (ref 7–15)
BUN SERPL-MCNC: 17.6 MG/DL (ref 8–23)
CALCIUM SERPL-MCNC: 9.3 MG/DL (ref 8.8–10.4)
CHLORIDE SERPL-SCNC: 101 MMOL/L (ref 98–107)
CHOLEST SERPL-MCNC: 189 MG/DL
CREAT SERPL-MCNC: 1.08 MG/DL (ref 0.67–1.17)
CRP SERPL-MCNC: <3 MG/L
EGFRCR SERPLBLD CKD-EPI 2021: 75 ML/MIN/1.73M2
ELLIPTOCYTES BLD QL SMEAR: SLIGHT
ERYTHROCYTE [DISTWIDTH] IN BLOOD BY AUTOMATED COUNT: 18 % (ref 10–15)
FASTING STATUS PATIENT QL REPORTED: ABNORMAL
FASTING STATUS PATIENT QL REPORTED: ABNORMAL
GLUCOSE SERPL-MCNC: 113 MG/DL (ref 70–99)
HCO3 SERPL-SCNC: 25 MMOL/L (ref 22–29)
HCT VFR BLD AUTO: 39.7 % (ref 40–53)
HDLC SERPL-MCNC: 37 MG/DL
HGB BLD-MCNC: 12.3 G/DL (ref 13.3–17.7)
LDLC SERPL CALC-MCNC: 104 MG/DL
MCH RBC QN AUTO: 19.8 PG (ref 26.5–33)
MCHC RBC AUTO-ENTMCNC: 31 G/DL (ref 31.5–36.5)
MCV RBC AUTO: 64 FL (ref 78–100)
NONHDLC SERPL-MCNC: 152 MG/DL
PLAT MORPH BLD: ABNORMAL
PLATELET # BLD AUTO: 155 10E3/UL (ref 150–450)
POTASSIUM SERPL-SCNC: 4.9 MMOL/L (ref 3.4–5.3)
PSA SERPL DL<=0.01 NG/ML-MCNC: 5.45 NG/ML (ref 0–4.5)
RBC # BLD AUTO: 6.21 10E6/UL (ref 4.4–5.9)
RBC MORPH BLD: ABNORMAL
SODIUM SERPL-SCNC: 136 MMOL/L (ref 135–145)
TRIGL SERPL-MCNC: 242 MG/DL
URATE SERPL-MCNC: 5.9 MG/DL (ref 3.4–7)
WBC # BLD AUTO: 8.3 10E3/UL (ref 4–11)

## 2024-09-03 PROCEDURE — 80061 LIPID PANEL: CPT

## 2024-09-03 PROCEDURE — 80048 BASIC METABOLIC PNL TOTAL CA: CPT

## 2024-09-03 PROCEDURE — 84550 ASSAY OF BLOOD/URIC ACID: CPT

## 2024-09-03 PROCEDURE — G0103 PSA SCREENING: HCPCS

## 2024-09-03 PROCEDURE — 36415 COLL VENOUS BLD VENIPUNCTURE: CPT

## 2024-09-03 PROCEDURE — 85027 COMPLETE CBC AUTOMATED: CPT

## 2024-09-03 PROCEDURE — 86140 C-REACTIVE PROTEIN: CPT

## 2024-09-10 ENCOUNTER — OFFICE VISIT (OUTPATIENT)
Dept: INTERNAL MEDICINE | Facility: CLINIC | Age: 69
End: 2024-09-10
Payer: MEDICARE

## 2024-09-10 VITALS
TEMPERATURE: 98.7 F | RESPIRATION RATE: 18 BRPM | OXYGEN SATURATION: 95 % | WEIGHT: 256 LBS | BODY MASS INDEX: 36.65 KG/M2 | SYSTOLIC BLOOD PRESSURE: 114 MMHG | DIASTOLIC BLOOD PRESSURE: 64 MMHG | HEIGHT: 70 IN | HEART RATE: 77 BPM

## 2024-09-10 DIAGNOSIS — R97.20 ELEVATED PROSTATE SPECIFIC ANTIGEN (PSA): ICD-10-CM

## 2024-09-10 DIAGNOSIS — M10.9 GOUTY ARTHROPATHY: ICD-10-CM

## 2024-09-10 DIAGNOSIS — D56.3 ALPHA THALASSEMIA TRAIT: ICD-10-CM

## 2024-09-10 DIAGNOSIS — I42.8 NON-ISCHEMIC CARDIOMYOPATHY (H): ICD-10-CM

## 2024-09-10 DIAGNOSIS — I10 PRIMARY HYPERTENSION: Chronic | ICD-10-CM

## 2024-09-10 DIAGNOSIS — E78.2 MIXED HYPERLIPIDEMIA: ICD-10-CM

## 2024-09-10 DIAGNOSIS — Z95.810 BIVENTRICULAR ICD (IMPLANTABLE CARDIOVERTER-DEFIBRILLATOR) IN PLACE: Chronic | ICD-10-CM

## 2024-09-10 DIAGNOSIS — I47.10 SVT (SUPRAVENTRICULAR TACHYCARDIA) (H): ICD-10-CM

## 2024-09-10 DIAGNOSIS — Z00.00 MEDICARE ANNUAL WELLNESS VISIT, SUBSEQUENT: Primary | ICD-10-CM

## 2024-09-10 PROCEDURE — G0439 PPPS, SUBSEQ VISIT: HCPCS | Performed by: INTERNAL MEDICINE

## 2024-09-10 ASSESSMENT — PAIN SCALES - GENERAL: PAINLEVEL: MODERATE PAIN (4)

## 2024-09-10 NOTE — PATIENT INSTRUCTIONS
Patient Education   Hearing Loss: Care Instructions  Overview     Hearing loss is a sudden or slow decrease in how well you hear. It can range from slight to profound. Permanent hearing loss can occur with aging. It also can happen when you are exposed long-term to loud noise. Examples include listening to loud music, riding motorcycles, or being around other loud machines.  Hearing loss can affect your work and home life. It can make you feel lonely or depressed. You may feel that you have lost your independence. But hearing aids and other devices can help you hear better and feel connected to others.  Follow-up care is a key part of your treatment and safety. Be sure to make and go to all appointments, and call your doctor if you are having problems. It's also a good idea to know your test results and keep a list of the medicines you take.  How can you care for yourself at home?  Avoid loud noises whenever possible. This helps keep your hearing from getting worse.  Always wear hearing protection around loud noises.  Wear a hearing aid as directed.  A professional can help you pick a hearing aid that will work best for you.  You can also get hearing aids over the counter for mild to moderate hearing loss.  Have hearing tests as your doctor suggests. They can show whether your hearing has changed. Your hearing aid may need to be adjusted.  Use other devices as needed. These may include:  Telephone amplifiers and hearing aids that can connect to a television, stereo, radio, or microphone.  Devices that use lights or vibrations. These alert you to the doorbell, a ringing telephone, or a baby monitor.  Television closed-captioning. This shows the words at the bottom of the screen. Most new TVs can do this.  TTY (text telephone). This lets you type messages back and forth on the telephone instead of talking or listening. These devices are also called TDD. When messages are typed on the keyboard, they are sent over the  "phone line to a receiving TTY. The message is shown on a monitor.  Use text messaging, social media, and email if it is hard for you to communicate by telephone.  Try to learn a listening technique called speechreading. It is not lipreading. You pay attention to people's gestures, expressions, posture, and tone of voice. These clues can help you understand what a person is saying. Face the person you are talking to, and have them face you. Make sure the lighting is good. You need to see the other person's face clearly.  Think about counseling if you need help to adjust to your hearing loss.  When should you call for help?  Watch closely for changes in your health, and be sure to contact your doctor if:    You think your hearing is getting worse.     You have new symptoms, such as dizziness or nausea.   Where can you learn more?  Go to https://www.[employer]"/patiented  Enter R798 in the search box to learn more about \"Hearing Loss: Care Instructions.\"  Current as of: September 27, 2023               Content Version: 14.0    1894-5721 [employer].   Care instructions adapted under license by your healthcare professional. If you have questions about a medical condition or this instruction, always ask your healthcare professional. [employer] disclaims any warranty or liability for your use of this information.      Learning About Sleeping Well  What does sleeping well mean?     Sleeping well means getting enough sleep to feel good and stay healthy. How much sleep is enough varies among people.  The number of hours you sleep and how you feel when you wake up are both important. If you do not feel refreshed, you probably need more sleep. Another sign of not getting enough sleep is feeling tired during the day.  Experts recommend that adults get at least 7 or more hours of sleep per day. Children and older adults need more sleep.  Why is getting enough sleep important?  Getting enough quality " "sleep is a basic part of good health. When your sleep suffers, your physical health, mood, and your thoughts can suffer too. You may find yourself feeling more grumpy or stressed. Not getting enough sleep also can lead to serious problems, including injury, accidents, anxiety, and depression.  What might cause poor sleeping?  Many things can cause sleep problems, including:  Changes to your sleep schedule.  Stress. Stress can be caused by fear about a single event, such as giving a speech. Or you may have ongoing stress, such as worry about work or school.  Depression, anxiety, and other mental or emotional conditions.  Changes in your sleep habits or surroundings. This includes changes that happen where you sleep, such as noise, light, or sleeping in a different bed. It also includes changes in your sleep pattern, such as having jet lag or working a late shift.  Health problems, such as pain, breathing problems, and restless legs syndrome.  Lack of regular exercise.  Using alcohol, nicotine, or caffeine before bed.  How can you help yourself?  Here are some tips that may help you sleep more soundly and wake up feeling more refreshed.  Your sleeping area   Use your bedroom only for sleeping and sex. A bit of light reading may help you fall asleep. But if it doesn't, do your reading elsewhere in the house. Try not to use your TV, computer, smartphone, or tablet while you are in bed.  Be sure your bed is big enough to stretch out comfortably, especially if you have a sleep partner.  Keep your bedroom quiet, dark, and cool. Use curtains, blinds, or a sleep mask to block out light. To block out noise, use earplugs, soothing music, or a \"white noise\" machine.  Your evening and bedtime routine   Create a relaxing bedtime routine. You might want to take a warm shower or bath, or listen to soothing music.  Go to bed at the same time every night. And get up at the same time every morning, even if you feel tired.  What to " "avoid   Limit caffeine (coffee, tea, caffeinated sodas) during the day, and don't have any for at least 6 hours before bedtime.  Avoid drinking alcohol before bedtime. Alcohol can cause you to wake up more often during the night.  Try not to smoke or use tobacco, especially in the evening. Nicotine can keep you awake.  Limit naps during the day, especially close to bedtime.  Avoid lying in bed awake for too long. If you can't fall asleep or if you wake up in the middle of the night and can't get back to sleep within about 20 minutes, get out of bed and go to another room until you feel sleepy.  Avoid taking medicine right before bed that may keep you awake or make you feel hyper or energized. Your doctor can tell you if your medicine may do this and if you can take it earlier in the day.  If you can't sleep   Imagine yourself in a peaceful, pleasant scene. Focus on the details and feelings of being in a place that is relaxing.  Get up and do a quiet or boring activity until you feel sleepy.  Avoid drinking any liquids before going to bed to help prevent waking up often to use the bathroom.  Where can you learn more?  Go to https://www.[employer].net/patiented  Enter J942 in the search box to learn more about \"Learning About Sleeping Well.\"  Current as of: July 10, 2023  Content Version: 14.1 2006-2024 [employer].   Care instructions adapted under license by your healthcare professional. If you have questions about a medical condition or this instruction, always ask your healthcare professional. [employer] disclaims any warranty or liability for your use of this information.    Substance Use Disorder: Care Instructions  Overview     You can improve your life and health by stopping your use of alcohol or drugs. When you don't drink or use drugs, you may feel and sleep better. You may get along better with your family, friends, and coworkers. There are medicines and programs that can help " with substance use disorder.  How can you care for yourself at home?  Here are some ways to help you stay sober and prevent relapse.  If you have been given medicine to help keep you sober or reduce your cravings, be sure to take it exactly as prescribed.  Talk to your doctor about programs that can help you stop using drugs or drinking alcohol.  Do not keep alcohol or drugs in your home.  Plan ahead. Think about what you'll say if other people ask you to drink or use drugs. Try not to spend time with people who drink or use drugs.  Use the time and money spent on drinking or drugs to do something that's important to you.  Preventing a relapse  Have a plan to deal with relapse. Learn to recognize changes in your thinking that lead you to drink or use drugs. Get help before you start to drink or use drugs again.  Try to stay away from situations, friends, or places that may lead you to drink or use drugs.  If you feel the need to drink alcohol or use drugs again, seek help right away. Call a trusted friend or family member. Some people get support from organizations such as Narcotics Anonymous or [employer] or from treatment facilities.  If you relapse, get help as soon as you can. Some people make a plan with another person that outlines what they want that person to do for them if they relapse. The plan usually includes how to handle the relapse and who to notify in case of relapse.  Don't give up. Remember that a relapse doesn't mean that you have failed. Use the experience to learn the triggers that lead you to drink or use drugs. Then quit again. Recovery is a lifelong process. Many people have several relapses before they are able to quit for good.  Follow-up care is a key part of your treatment and safety. Be sure to make and go to all appointments, and call your doctor if you are having problems. It's also a good idea to know your test results and keep a list of the medicines you take.  When should you  "call for help?   Call 911  anytime you think you may need emergency care. For example, call if you or someone else:    Has overdosed or has withdrawal signs. Be sure to tell the emergency workers that you are or someone else is using or trying to quit using drugs. Overdose or withdrawal signs may include:  Losing consciousness.  Seizure.  Seeing or hearing things that aren't there (hallucinations).     Is thinking or talking about suicide or harming others.   Where to get help 24 hours a day, 7 days a week   If you or someone you know talks about suicide, self-harm, a mental health crisis, a substance use crisis, or any other kind of emotional distress, get help right away. You can:    Call the Suicide and Crisis Lifeline at 988.     Call 8-273-505-TALK (1-120.353.4135).     Text HOME to 754173 to access the Crisis Text Line.   Consider saving these numbers in your phone.  Go to [employer] for more information or to chat online.  Call your doctor now or seek immediate medical care if:    You are having withdrawal symptoms. These may include nausea or vomiting, sweating, shakiness, and anxiety.   Watch closely for changes in your health, and be sure to contact your doctor if:    You have a relapse.     You need more help or support to stop.   Where can you learn more?  Go to https://www.[employer].net/patiented  Enter H573 in the search box to learn more about \"Substance Use Disorder: Care Instructions.\"  Current as of: November 15, 2023               Content Version: 14.0    6216-5034 [employer].   Care instructions adapted under license by your healthcare professional. If you have questions about a medical condition or this instruction, always ask your healthcare professional. [employer] disclaims any warranty or liability for your use of this information.         "

## 2024-09-10 NOTE — PROGRESS NOTES
[facility]    Comprehensive and complex medical care - Chronic disease management - Shared decision making - Care coordination - Compassionate care    Patient advocacy - Rational deprescribing - Minimally disruptive medicine - Ethical focus - Customized care         Date of Service: 9/10/2024  Primary Provider: Paramjit Boyle    Patient Care Team:  Paramjit Boyle MD as PCP - General (Internal Medicine)  Chris Brandt MD as MD (Dermatology)  Francis Cruz MD as MD (Cardiovascular Disease)  Paramjit Boyle MD as Assigned PCP          Patient's Pharmacy:    [id number] - [address] [address]  Phone: [phone] Fax: [phone]     Patient's Contacts:  Name Home Phone Work Phone Mobile Phone Relationship Lgl SILVINO Lauren [phone] Spouse    PABLITO RANDHAWA [phone]   Other      Patient's Insurance:    Payor: MEDICARE / Plan: MEDICARE / Product Type: Medicare /      Subjective:     History of present illness:    Cj Randhawa is an 69 year old here for an annual wellness visit.    The issues he would like to address at today's visit include the following:    Chief Complaint   Patient presents with    Wellness Visit          9/10/2024     7:13 AM   Additional Questions   Roomed by Gm DUMONT MA     Patient comes in today for annual wellness visit and other issues.    We reviewed his cellulitis which seems to have improved significantly.  He still has a little bit of tenderness over the left lateral ankle but no residual signs of infection and recent CRP level done in his blood was good.    We reviewed his previous ultrasound done during this time and the pluses and minuses of rechecking and he is okay with following his symptoms at this time.    His blood pressure is doing well.  He has cardiomyopathy and follows with cardiology.  He remains on lisinopril and metoprolol.    He  does have a history of elevated PSA and this is about the same.  No major changes overall.  He wishes to follow.    No major gout or cholesterol issues as of late.    We reviewed his other issues noted in the assessment but not specifically addressed in the HPI above.           Active Problem List:  Problem List as of 9/10/2024 Reviewed: 9/10/2024  7:21 AM by Paramjit Boyle MD         High    Former smoker    ED (erectile dysfunction)       Medium    Primary hypertension    Biventricular ICD (implantable cardioverter-defibrillator) in place    Alpha thalassemia trait    Non-ischemic cardiomyopathy (H)    History of malignant melanoma    Elevated prostate specific antigen (PSA)    SVT (supraventricular tachycardia) (H24)       Low    Spinal stenosis in cervical region    Mixed hyperlipidemia    Nephrolithiasis    Gout    Tubular adenoma of colon - last colonoscopy 2019    Morbid obesity (H)    Spinal stenosis of lumbar region with neurogenic claudication        Past Medical History:   Diagnosis Date    Alpha thalassemia trait     Created by [employer] [facility] Annotation: May  7 2008 11:11AM - Destiny Pat: alpha   thalassemia   trait       Coronary artery disease     hilton score 148  mostly lad    Family history of myocardial infarction     pgf    Gout     High cholesterol     dislipidemia    Hyperlipidemia     Hypertension     Morbid obesity (H) 11/10/2017    Nephrolithiasis     Spinal stenosis in cervical region     C5 - C6 WITH LEFT C6 IMPINGEMENT       Thalassemia      Past Surgical History:   Procedure Laterality Date    COLONOSCOPY N/A 2/26/2019    Procedure: COLONOSCOPY with polypectomy;  Surgeon: Lara Harman MD;  Location: [facility];  Service: Gastroenterology    COLONOSCOPY N/A 5/1/2024    Procedure: Colonoscopy;  Surgeon: Chelsey Rees MD;  Location: [facility]    INGUINAL HERNIA REPAIR      REVISE SECONDARY VARICOSITY      Description: Varicose Vein Ligation;  Recorded: 05/19/2009;      Family History   Problem Relation Age of Onset    Kidney failure Mother     Diabetes Type 2  Mother     Lung Cancer Father     No Known Problems Sister     No Known Problems Sister     Gout Other     No Known Problems Daughter     No Known Problems Daughter     No Known Problems Son      Family history is otherwise noncontributory.     Social History     Occupational History    Not on file   Tobacco Use    Smoking status: Former     Current packs/day: 0.00     Types: Cigarettes     Quit date: 1988     Years since quittin.6    Smokeless tobacco: Never    Tobacco comments:     35 years ago   Vaping Use    Vaping status: Never Used   Substance and Sexual Activity    Alcohol use: Yes     Alcohol/week: 12.0 standard drinks of alcohol    Drug use: Yes     Types: Marijuana    Sexual activity: Yes     Partners: Female     Birth control/protection: None      Social History     Social History Narrative    He works for [employer] as a manager.  He is  and has 3 children and 3 grandchildren.      Current Outpatient Medications   Medication Instructions    allopurinol (ZYLOPRIM) 300 mg, Oral, DAILY    lisinopril (ZESTRIL) 20 mg, Oral, DAILY    metoprolol succinate ER (TOPROL XL) 50 MG 24 hr tablet 1 tablet, Oral, DAILY    naproxen sodium (ANAPROX) 220 mg, Oral, DAILY    sildenafil (REVATIO) 20 MG tablet [SILDENAFIL (REVATIO) 20 MG TABLET] Two to four tabs as needed for erectile dysfunction    Vitamin D, Cholecalciferol, 10 MCG (400 UNIT) TABS Oral     Allergies: Simvastatin and Spironolactone     Immunization History   Administered Date(s) Administered    COVID-19 Bivalent 12+ (Pfizer) 10/26/2022    COVID-19 MONOVALENT 12+ (Pfizer) 2021, 2021, 2021    DT (PEDS <7y) 1999, 1999    Influenza Vaccine 65+ (Fluzone HD) 10/26/2022    Pneumococcal 20 valent Conjugate (Prevnar 20) 2022    TD,PF 7+ (Tenivac) 2020    TDAP (Adacel,Boostrix) 2010    Td (Adult),  "Adsorbed 01/01/1999    Td, Absorbed, Pf, Adult, Lf Unspecified 02/21/2020    Td,adult,historic,unspecified 01/01/1999    Zoster recombinant adjuvanted (SHINGRIX) 05/25/2021, 08/20/2021    Zoster vaccine, live 07/19/2013      Objective:     Wt Readings from Last 3 Encounters:   09/10/24 116.1 kg (256 lb)   08/01/24 114.7 kg (252 lb 14.4 oz)   07/27/24 114 kg (251 lb 5.2 oz)     BP Readings from Last 3 Encounters:   09/10/24 114/64   08/01/24 120/58   07/27/24 128/61       PHYSICAL EXAM  /64 (BP Location: Right arm, Patient Position: Sitting, Cuff Size: Adult Regular)   Pulse 77   Temp 98.7  F (37.1  C) (Oral)   Resp 18   Ht 1.778 m (5' 10\")   Wt 116.1 kg (256 lb)   SpO2 95%   BMI 36.73 kg/m     The patient is comfortable, no acute distress.  Mood good.  Insight is good.  No skin lesions or nodules of concern.  Ears clear.  Eyes are nonicteric.  Pupils equal and reactive.  Throat is clear.  Neck is supple without mass, no thyromegaly. No cervical or epitrochlear adenopathy.  Heart regular rate and rhythm.  Lungs clear to auscultation bilaterally.  Respiratory effort good.  Abdomen soft and nontender.  No hepatosplenomegaly.  Extremities show no edema.         Diagnostics:     Lab on 09/03/2024   Component Date Value Ref Range Status    WBC Count 09/03/2024 8.3  4.0 - 11.0 10e3/uL Final    RBC Count 09/03/2024 6.21 (H)  4.40 - 5.90 10e6/uL Final    Hemoglobin 09/03/2024 12.3 (L)  13.3 - 17.7 g/dL Final    Hematocrit 09/03/2024 39.7 (L)  40.0 - 53.0 % Final    MCV 09/03/2024 64 (L)  78 - 100 fL Final    MCH 09/03/2024 19.8 (L)  26.5 - 33.0 pg Final    MCHC 09/03/2024 31.0 (L)  31.5 - 36.5 g/dL Final    RDW 09/03/2024 18.0 (H)  10.0 - 15.0 % Final    Platelet Count 09/03/2024 155  150 - 450 10e3/uL Final    Sodium 09/03/2024 136  135 - 145 mmol/L Final    Potassium 09/03/2024 4.9  3.4 - 5.3 mmol/L Final    Chloride 09/03/2024 101  98 - 107 mmol/L Final    Carbon Dioxide (CO2) 09/03/2024 25  22 - 29 mmol/L " Final    Anion Gap 09/03/2024 10  7 - 15 mmol/L Final    Urea Nitrogen 09/03/2024 17.6  8.0 - 23.0 mg/dL Final    Creatinine 09/03/2024 1.08  0.67 - 1.17 mg/dL Final    GFR Estimate 09/03/2024 75  >60 mL/min/1.73m2 Final    eGFR calculated using 2021 CKD-EPI equation.    Calcium 09/03/2024 9.3  8.8 - 10.4 mg/dL Final    Reference intervals for this test were updated on 7/16/2024 to reflect our healthy population more accurately. There may be differences in the flagging of prior results with similar values performed with this method. Those prior results can be interpreted in the context of the updated reference intervals.    Glucose 09/03/2024 113 (H)  70 - 99 mg/dL Final    Patient Fasting > 8hrs? 09/03/2024 Unknown   Final    CRP Inflammation 09/03/2024 <3.00  <5.00 mg/L Final    Uric Acid 09/03/2024 5.9  3.4 - 7.0 mg/dL Final    Prostate Specific Antigen Screen 09/03/2024 5.45 (H)  0.00 - 4.50 ng/mL Final    Cholesterol 09/03/2024 189  <200 mg/dL Final    Triglycerides 09/03/2024 242 (H)  <150 mg/dL Final    Direct Measure HDL 09/03/2024 37 (L)  >=40 mg/dL Final    LDL Cholesterol Calculated 09/03/2024 104 (H)  <=100 mg/dL Final    Non HDL Cholesterol 09/03/2024 152 (H)  <130 mg/dL Final    Patient Fasting > 8hrs? 09/03/2024 Unknown   Final    RBC Morphology 09/03/2024 Confirmed RBC Indices   Final    Platelet Assessment 09/03/2024 Automated Count Confirmed. Platelet morphology is normal.  Automated Count Confirmed. Platelet morphology is normal. Final    Elliptocytes 09/03/2024 Slight (A)  None Seen Final        No results found for any visits on 09/10/24.     Assessment:     1. Medicare annual wellness visit, subsequent    2. Primary hypertension    3. Biventricular ICD (implantable cardioverter-defibrillator) in place    4. Alpha thalassemia trait    5. Non-ischemic cardiomyopathy (H)    6. Elevated prostate specific antigen (PSA)    7. SVT (supraventricular tachycardia) (H24)    8. Mixed hyperlipidemia    9.  Gout         Plan:     Blood work done before the visit was reviewed today.  We are going to continue the medications as is.  Aortic aneurysm screening reviewed but declined at this time.  Reviewed vaccination with the patient.  Continue current medications.  Follow up sooner if issues.    No orders of the defined types were placed in this encounter.       A personalized health plan based on the identified health risks was provided to the patient on the AVS.       Paramjit Boyle MD  General Internal Medicine  [facility]    Return in about 1 year (around 9/10/2025) for annual wellness visit, visit and blood work.     No future appointments.      Health Maintenance   Topic Date Due    AORTIC ANEURYSM SCREENING (SYSTEM ASSIGNED)  Never done    ANNUAL REVIEW OF HM ORDERS  08/16/2023    INFLUENZA VACCINE (1) 09/01/2024    COVID-19 Vaccine (5 - 2023-24 season) 09/01/2024    RSV VACCINE (1 - Risk 60-74 years 1-dose series) 04/19/2025 (Originally 6/25/2016)    MEDICARE ANNUAL WELLNESS VISIT  09/10/2025    FALL RISK ASSESSMENT  09/10/2025    LIPID  09/03/2027    GLUCOSE  09/03/2027    ADVANCE CARE PLANNING  09/10/2029    DTAP/TDAP/TD IMMUNIZATION (4 - Td or Tdap) 02/21/2030    COLORECTAL CANCER SCREENING  05/01/2034    PHQ-2 (once per calendar year)  Completed    Pneumococcal Vaccine: 65+ Years  Completed    ZOSTER IMMUNIZATION  Completed    HPV IMMUNIZATION  Aged Out    MENINGITIS IMMUNIZATION  Aged Out    RSV MONOCLONAL ANTIBODY  Aged Out    HEPATITIS C SCREENING  Discontinued        ______________________________________________________________________     Additional required elements:    I have reviewed Opioid Use Disorder and Substance Use Disorder risk factors and made any needed referrals.  This may not apply to this individual patient, but this documentation is required by Medicare.    Memory screen:      9/10/2024     7:18 AM   MINI COG   Clock Draw Score 2 Normal   3 Item Recall 3  objects recalled   Mini Cog Total Score 5        PHQ-2 Score:         9/10/2024     7:09 AM 4/19/2024    11:26 AM   PHQ-2 ( 1999 Pfizer)   Q1: Little interest or pleasure in doing things 0 0   Q2: Feeling down, depressed or hopeless 0 0   PHQ-2 Score 0 0   Q1: Little interest or pleasure in doing things Not at all Not at all   Q2: Feeling down, depressed or hopeless Not at all Not at all   PHQ-2 Score 0 0        Advanced care planning reviewed.        9/10/2024   Fall Risk   Fallen 2 or more times in the past year? No   Trouble with walking or balance? No             9/10/2024   Substance Use   Alcohol more than 3/day or more than 7/wk No   Do you have a current opioid prescription? No   How severe/bad is pain from 1 to 10? 5/10   Do you use any other substances recreationally? (!) CANNABIS PRODUCTS          Last vision screening (if done):       No data to display

## 2025-02-06 ENCOUNTER — VIRTUAL VISIT (OUTPATIENT)
Dept: FAMILY MEDICINE | Facility: CLINIC | Age: 70
End: 2025-02-06
Payer: MEDICARE

## 2025-02-06 DIAGNOSIS — I42.9 CARDIOMYOPATHY, UNSPECIFIED TYPE (H): ICD-10-CM

## 2025-02-06 DIAGNOSIS — E66.01 MORBID OBESITY (H): ICD-10-CM

## 2025-02-06 DIAGNOSIS — M48.062 SPINAL STENOSIS OF LUMBAR REGION WITH NEUROGENIC CLAUDICATION: Primary | ICD-10-CM

## 2025-02-06 DIAGNOSIS — C43.72 MALIGNANT MELANOMA OF LEFT LOWER LEG (H): ICD-10-CM

## 2025-02-06 RX ORDER — METHYLPREDNISOLONE 4 MG/1
TABLET ORAL
Qty: 21 TABLET | Refills: 0 | Status: SHIPPED | OUTPATIENT
Start: 2025-02-06

## 2025-02-06 ASSESSMENT — ENCOUNTER SYMPTOMS: LEG PAIN: 1

## 2025-02-06 NOTE — PROGRESS NOTES
"Jhonny is a 69 year old who is being evaluated via a billable video visit.    How would you like to obtain your AVS? MyChart  If the video visit is dropped, the invitation should be resent by: Text to cell phone: [phone]  Will anyone else be joining your video visit? No      Assessment & Plan     1. Spinal stenosis of lumbar region with neurogenic claudication (Primary)  Patient history of spinal stenosis in the lumbar spine.  Recently has started noticing some lower genic claudication symptoms.  He has been seeing chiropractor who recommended to see a provider for an treatment plan.  He was not able to come in today for an office visit really.  I have reviewed her chart and recommended to repeat a CT of the lumbar spine without contrast.  Last 1 was done in 2019.  MRI is contraindicated for him.  Recommending to use Medrol Dosepak.  Use Tylenol.  His cardiology team does not want him to take NSAIDs which is understandable.  Apply ice or heat to the affected area.  Await the test results  - methylPREDNISolone (MEDROL DOSEPAK) 4 MG tablet therapy pack; Follow Package Directions  Dispense: 21 tablet; Refill: 0  - CT Lumbar Spine w/o Contrast; Future    2. Malignant melanoma of left lower leg (H)  Management per dermatology    3. Cardiomyopathy, unspecified type (H)  Management per cardiology.    4. Morbid obesity (H)    BMI  Estimated body mass index is 36.73 kg/m  as calculated from the following:    Height as of 9/10/24: 1.778 m (5' 10\").    Weight as of 9/10/24: 116.1 kg (256 lb).             Subjective   Jhonny is a 69 year old, presenting for the following health issues:  Leg Pain        2/6/2025    12:45 PM   Additional Questions   Roomed by Basilio DUMONT     Video Start Time:  1:42 pm    Leg Pain         Pain History:  When did you first notice your pain? 7 + months ago    Have you seen this provider for your pain in the past? No   Where in your body do your have pain? Left Back/Leg  Are you seeing anyone else for " your pain? Yes - PCP in [place] and Chiro  What makes your pain better? Chiro, Ice  What makes your pain worse? Movement  How has pain affected your ability to work? Semi retired, things that just can't be done due to weakness in leg                   Chronic Pain - Initial Assessment:    How would you describe your pain? Sharp - shooting at times lasting only a little bit, helps to change position.   Have you had any recent changes to the severity or character of your pain? Worsening   Is there an underlying cause that has been identified? No  Has your ability to work or do daily activities changed recently because of your pain? things that just can't be done due to weakness in leg   Which of these pain treatments have you tried? Chiropractor, Cold, and Other: Aleve        Review of Systems  CONSTITUTIONAL: NEGATIVE for fever, chills, change in weight  ENT/MOUTH: NEGATIVE for ear, mouth and throat problems  RESP: NEGATIVE for significant cough or SOB  CV: NEGATIVE for chest pain, palpitations or peripheral edema      Objective    Vitals - Patient Reported  Pain Score: Severe Pain (7)        Physical Exam   GENERAL: alert and no distress  EYES: Eyes grossly normal to inspection.  No discharge or erythema, or obvious scleral/conjunctival abnormalities.  RESP: No audible wheeze, cough, or visible cyanosis.    SKIN: Visible skin clear. No significant rash, abnormal pigmentation or lesions.  NEURO: Cranial nerves grossly intact.  Mentation and speech appropriate for age.  PSYCH: Appropriate affect, tone, and pace of words          Video-Visit Details    Type of service:  Video Visit   Video End Time: 1:51pm  Originating Location (pt. Location): Home    Distant Location (provider location):  On-site  Platform used for Video Visit: Celi  Signed Electronically by: Kathe Rios MD

## 2025-02-17 ENCOUNTER — HOSPITAL ENCOUNTER (OUTPATIENT)
Dept: CT IMAGING | Facility: HOSPITAL | Age: 70
Discharge: HOME | End: 2025-02-17
Attending: FAMILY MEDICINE | Admitting: FAMILY MEDICINE
Payer: MEDICARE

## 2025-02-17 DIAGNOSIS — M48.062 SPINAL STENOSIS OF LUMBAR REGION WITH NEUROGENIC CLAUDICATION: ICD-10-CM

## 2025-02-17 PROCEDURE — 72131 CT LUMBAR SPINE W/O DYE: CPT

## 2025-02-20 NOTE — TELEPHONE ENCOUNTER
NEUROSURGERY - NEW PREVISIT PLANNING    Referring Provider: Kathe Rios MD in [facility]/IM/PEDS   OVN 2/6/2025   Reason For Visit: Spinal stenosis of lumbar region with neurogenic claudication [M48.062]   DIRECT REFERRAL: NA       IMAGING STATUS/LOCATION DATE/TYPE   MRI NA    CT INTERNAL/IMAGING  2/17/2025  EXAM: CT LUMBAR SPINE W/O CONTRAST   XRAY NA    NOTES     Other specialist OVN: NA    EMG NA    INJECTION NA    PHYSICAL THERAPY NA    SURGERY NA

## 2025-02-21 ENCOUNTER — PRE VISIT (OUTPATIENT)
Dept: NEUROSURGERY | Facility: CLINIC | Age: 70
End: 2025-02-21

## 2025-02-27 ENCOUNTER — THERAPY VISIT (OUTPATIENT)
Dept: PHYSICAL THERAPY | Facility: REHABILITATION | Age: 70
End: 2025-02-27
Attending: PHYSICIAN ASSISTANT
Payer: MEDICARE

## 2025-02-27 DIAGNOSIS — M48.062 SPINAL STENOSIS OF LUMBAR REGION WITH NEUROGENIC CLAUDICATION: Primary | ICD-10-CM

## 2025-02-27 NOTE — PROGRESS NOTES
"PHYSICAL THERAPY EVALUATION  Type of Visit: Evaluation        Fall Risk Screen:  Fall screen completed by: PT  Have you fallen 2 or more times in the past year?: No  Have you fallen and had an injury in the past year?: No  Is patient a fall risk?: No    Subjective         Presenting condition or subjective complaint: left leg pain front of thigh front of calf especially when laying down  or standing straight up  Pain started 3-4 months ago, possibly after increasing leg press. Started in the hip and down the front of the thigh. Laying down will get pain down the thigh- feels like a deep pain. This has some numbness as well. Front of the shin is numb, balls of the feel have been numb for a couple of year. Pain is only in the left leg. Does not have too much in the way of back pain, more a pressure/tightness/stiffness. Cannot do the laying down exercises due pain. Does feel some weakness in the legs generally, No change in bowel/bladder control, balance. Worse with laying down, anything overhead, walking is painful and numb, but does not stop him, hard to get up into his truck, stepping down on the left leg. Better with sitting down chiropractic for a couple of days, walking hunched over. Previous Treatment chiropractic, PT in past, does have a leg press machine at home- has started doing that a little heavier and this happened since then.     From Khloe Dior PA-C note on 2/21/25  \"Cj Guo is a pleasant 68 yo male who presents to the clinic today for consultation regarding spinal stenosis. He is referred to the Neurosurgery Clinic by Dr. Rios in Family Medicine. Today, He reports a 3-month history of symptoms.      He describes his pain as intermittent sharp pain, initiating in the lower lumbar region and radiating distally in the left leg anterolateral thigh, anterolateral shin. This pain is associated with left upper leg numbness. Patient also reports numbness in both feet. Prolonged walking and standing " "aggravate the symptoms, while alleviation is obtained by sitting and rest.      No mechanism of injury such as trauma or a fall is associated with the onset of the pain. There are no bowel or bladder changes. He denies saddle anesthesia. He reports he is able to walk only a minimal distance standing straight up, reporting he is often leaning forward in order to walk without pain.      He has participated in conservative therapies to include chiropractic care and a Medrol Dosepak. None of these modalities have provided any significant long term relief. Of note, patient reports a history of upper neck pain around 9 years ago in which he saw a neurosurgeon. Patient reports he found improvement with chiropractic care and NSAIDs.\"     Date of onset: 02/21/25 (order date)    Relevant medical history: Arthritis; Cold or hot arm or leg   Dates & types of surgery: 2016 melanoma left ankle   2018 pacemaker    Prior diagnostic imaging/testing results: CT scan     EXAM: CT LUMBAR SPINE W/O CONTRAST - DATE: 2/17/2025     FINDINGS:  Nomenclature is based on 5 lumbar type vertebral bodies. Grade 1 anterolisthesis of L4 on L5. Mild retrolisthesis of L5 on S1. Lumbar vertebral body heights are maintained. No evidence of acute lumbar fracture. Scattered atherosclerosis of the visualized   abdominal aorta. Colonic diverticulosis. Degenerative changes of the bilateral sacroiliac joints.     T12-L1: Mild loss of disc height. No significant herniation. Normal facets. No significant spinal canal or neural foraminal stenosis.     L1-L2: Moderate loss of disc height. Mild disc bulge with left paracentral disc protrusion. Mild facet hypertrophy. No significant spinal canal stenosis. Mild bilateral neural foraminal stenosis.     L2-L3: Mild loss of disc height. Broad disc bulge. Mild facet hypertrophy. Mild spinal canal stenosis. Mild bilateral neural foraminal stenosis.     L3-L4: Moderate loss of disc height with vacuum phenomenon. Broad " disc bulge. Bilateral facet hypertrophy. Moderate spinal canal stenosis. Moderate to severe left and moderate right neural foraminal stenosis.     L4-L5: Mild loss of disc height. Broad disc bulge. Marked bilateral facet hypertrophy. Moderate to severe spinal canal stenosis. Moderate bilateral neural foraminal stenosis.     L5-S1: Moderate to advanced loss of disc height with vacuum phenomenon. Broad disc bulge and endplate osteophytic spurring. Bilateral facet hypertrophy. No significant spinal canal stenosis. Moderate bilateral neural foraminal stenosis.                                                                   IMPRESSION:  1.  At L4-L5 there is moderate to severe spinal canal stenosis and moderate bilateral neural foraminal stenosis.  2.  At L3-L4 there is moderate spinal canal stenosis as well as moderate to severe left and moderate right neural foraminal stenosis.  3.  Moderate bilateral neural foraminal stenosis at L5-S1.  4.  Grade 1 anterolisthesis of L4 on L5.  5.  Additional multilevel lumbar spondylosis, slightly progressed since the prior CT, as detailed above.  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 [employer]  Hobbies/Interests: hunting fishing    Patient goals for therapy: stand up straight    Pain assessment: Pain present     Objective   Precautions/Restrictions: CAD, HLD, HTN,   Involved side: left  Posture Observation:      Slight flexed forward in standing    Lumbar ROM:    Date: 2/27/2025   *Indicate scale AROM   Lumbar Flexion To  toes   Lumbar Extension Major limit pain the hip    Right Left   Lumbar Sidebending WNL WNL   Lumbar Rotation     Thoracic Rotation       Lower Extremity Strength:     Date: 2/27/2025   LE strength/5 Right Left   Hip Flexion (L1-3) 5 pain on left 5    Hip Extension (L5-S1) 5 4   Hip Abduction (L4-5) 5 5   Hip Adduction (L2-3)     Hip External Rotation     Hip Internal Rotation     Knee Extension (L3-4) 5 5   Knee Flexion     Ankle Dorsiflexion (L4-5) 5 5   Great Toe Extension (L5) 5 5   Ankle Plantar flexion (S1)     Abdominals      Sensation           Reflex Testing  Lumbar Dermatomes Right Left UE Reflexes Right Left   Iliac Crest and Groin (L1)   Biceps (C5-6)     Anterior Medial Thigh (L2)   Brachioradialis (C5-6)     Anterior Thigh, Medial Epicondyle Femur (L3)   Triceps (C7-8)     Lateral Thigh, Anterior Knee, Medial Leg/Malleolus (L4)   Uma's test     Lateral Leg, Dorsal Foot (L5) Dec at toe Dec at toe LE Reflexes     Lateral Foot (S1)   Patellar (L3-4)     Posterior Leg (S2)   Achilles (S1-2)     Other:   Babinski Response       Palpation: tender with increased tone to bilateral lumbar paraspinals, QL    Lumbar Special Tests:     Lumbar Special Tests Right Left SI Tests Right  Left   Quadrant test   SI Compression     Straight leg raise 80 80 SI Distraction     Crossover response   POSH/Thigh Thrust Test - -   Slump - - Sacral Thrust - -   Sit-up test  Gaenslen's     Trunk extensor endurance test  FADIR - -   Prone instability test  FRANKIE - -   Pubic shotgun  Lucian's       Repeated Motion Testing:  Repeated extensions improved lumbar ROM and dec in pain in LE    Passive Mobility - Joint Integrity:  Hypomobile L1-5- some referred pain with L3-4- improved with joint mobilizations    LE Screen:  Dec hip flexor length bilat.      Assessment & Plan   CLINICAL IMPRESSIONS  Medical Diagnosis: Spinal stenosis of lumbar region with neurogenic claudication    Treatment Diagnosis: LBP, lumbar radicular pain, dec  lumbar ROM, hip and core weakness   Impression/Assessment: Patient is a 69 year old male with low back with LLE pain complaints.  The following significant findings have been identified: Pain, Decreased ROM/flexibility, Decreased joint mobility, Decreased strength, Impaired muscle performance, Decreased activity tolerance, and Impaired posture. These impairments interfere with their ability to perform self care tasks, work tasks, recreational activities, household chores, and community mobility as compared to previous level of function. Pain seems to be mostly radicular in nature related to degenerative changes, but also facet hypomobility and biomechanical stressors.    Clinical Decision Making (Complexity):  Clinical Presentation: Stable/Uncomplicated  Clinical Presentation Rationale: based on medical and personal factors listed in PT evaluation  Clinical Decision Making (Complexity): Low complexity    PLAN OF CARE  Treatment Interventions:  Interventions: Manual Therapy, Neuromuscular Re-education, Therapeutic Activity, Therapeutic Exercise, Self-Care/Home Management    Long Term Goals     PT Goal 1  Goal Description: Patient will be able to lay down without increased pain in 8 weeks:  Target Date: 04/24/25  PT Goal 2  Goal Description: Patient will be able to walk 1 mile without increased pain in 8 weeks  Target Date: 04/24/25  PT Goal 3  Goal Description: Patient will be able to reduce pain wtih use of HEP for self management of condition in 6 weeks  Target Date: 04/10/25      Frequency of Treatment: 1X/week  Duration of Treatment: up to 8 visits over 12 weeks    Recommended Referrals to Other Professionals:   Education Assessment:   Learner/Method: Patient  Education Comments: PT POC, HEP    Risks and benefits of evaluation/treatment have been explained.   Patient/Family/caregiver agrees with Plan of Care.     Evaluation Time:     PT Eval, Low Complexity Minutes (12134): 24       Signing Clinician: Renan LEÓN  Omari, PT        [facility]                                                                                   OUTPATIENT PHYSICAL THERAPY      PLAN OF TREATMENT FOR OUTPATIENT REHABILITATION   Patient's Last Name, First Name, Cj Alba YOB: 1955   Provider's Name   [facility]   Medical Record No.  [id number]     Onset Date: 02/21/25 (order date)  Start of Care Date: 02/27/25     Medical Diagnosis:  Spinal stenosis of lumbar region with neurogenic claudication      PT Treatment Diagnosis:  LBP, lumbar radicular pain, dec lumbar ROM, hip and core weakness Plan of Treatment  Frequency/Duration: 1X/week/ up to 8 visits over 12 weeks    Certification date from 02/27/25 to 05/22/25         See note for plan of treatment details and functional goals     Renan Salcido, PT                         I CERTIFY THE NEED FOR THESE SERVICES FURNISHED UNDER        THIS PLAN OF TREATMENT AND WHILE UNDER MY CARE     (Physician attestation of this document indicates review and certification of the therapy plan).              Referring Provider:  Khloe Dior    Initial Assessment  See Epic Evaluation- Start of Care Date: 02/27/25

## 2025-03-12 ENCOUNTER — THERAPY VISIT (OUTPATIENT)
Dept: PHYSICAL THERAPY | Facility: REHABILITATION | Age: 70
End: 2025-03-12
Attending: PHYSICIAN ASSISTANT
Payer: MEDICARE

## 2025-03-12 DIAGNOSIS — M48.062 SPINAL STENOSIS OF LUMBAR REGION WITH NEUROGENIC CLAUDICATION: Primary | ICD-10-CM

## 2025-03-19 ENCOUNTER — THERAPY VISIT (OUTPATIENT)
Dept: PHYSICAL THERAPY | Facility: REHABILITATION | Age: 70
End: 2025-03-19
Attending: PHYSICIAN ASSISTANT
Payer: MEDICARE

## 2025-03-19 DIAGNOSIS — M48.062 SPINAL STENOSIS OF LUMBAR REGION WITH NEUROGENIC CLAUDICATION: Primary | ICD-10-CM

## 2025-03-19 PROCEDURE — 97110 THERAPEUTIC EXERCISES: CPT | Mod: GP | Performed by: PHYSICAL THERAPIST

## 2025-03-19 PROCEDURE — 97140 MANUAL THERAPY 1/> REGIONS: CPT | Mod: GP | Performed by: PHYSICAL THERAPIST

## 2025-04-10 ENCOUNTER — THERAPY VISIT (OUTPATIENT)
Dept: PHYSICAL THERAPY | Facility: REHABILITATION | Age: 70
End: 2025-04-10
Payer: MEDICARE

## 2025-04-10 DIAGNOSIS — M48.062 SPINAL STENOSIS OF LUMBAR REGION WITH NEUROGENIC CLAUDICATION: Primary | ICD-10-CM

## 2025-04-15 ENCOUNTER — TELEPHONE (OUTPATIENT)
Dept: NEUROSURGERY | Facility: CLINIC | Age: 70
End: 2025-04-15
Payer: MEDICARE

## 2025-04-15 DIAGNOSIS — M48.062 SPINAL STENOSIS OF LUMBAR REGION WITH NEUROGENIC CLAUDICATION: Primary | ICD-10-CM

## 2025-04-15 NOTE — TELEPHONE ENCOUNTER
Per note 2/21/25 placed orders for MRI and x-ray. Notified patient and he is satisfied with plan. Radiology scheduling to reach out due to MRI conditional pacemaker.

## 2025-04-15 NOTE — TELEPHONE ENCOUNTER
[place] Call Center    Phone Message    May a detailed message be left on voicemail: yes     Reason for Call: Other: Patient has been doing PT and he says that it hasn't been helping the pain in the left leg. He said that he was told to let the provider know if PT wasn't helping or if the pain got worse. Patient would like to do an MRI next. Please review and call patient back.     Action Taken: Message routed to:  [facility]     Travel Screening: Not Applicable     Date of Service:                                                                      Skin normal color for race, warm, dry and intact. No evidence of rash.

## 2025-04-16 ENCOUNTER — TELEPHONE (OUTPATIENT)
Dept: MRI IMAGING | Facility: HOSPITAL | Age: 70
End: 2025-04-16
Payer: MEDICARE

## 2025-04-16 NOTE — TELEPHONE ENCOUNTER
Reason for call: MR Device Safety Clearance    Please create a MR Device Safety order  Patient is reporting patient has Pacemaker  Type of MR exam: MR LUMBAR SPINE     Do you get your Device checked at [facility]?: No - Where do you get your device checked (clinic name and location): [facility]

## 2025-04-16 NOTE — TELEPHONE ENCOUNTER
Is the implanted device safe for MRI Exam? Yes  Is this device 3T compatible? Yes  Device Type: ICD      Device Information: Medtronic, CRT-D Claria       Cardiology Orders for Device Programming     -- Yes -- The patient has a MRI conditional pulse generator and leads from the same     -- Yes -- The pulse generator and leads have been implanted for at least 6 weeks. (Does not apply to [employer] devices)    -- Yes-- The device is implanted in the right or left pectoral region    -- NA   -There are not any additional active cardiac devices, abandoned leads, lead extenders or adapters. NON-FV DEVICE PATIENT; PLEASE COMPLETE CXR PRIOR TO MRI PER RADIOLOGY PROTOCOL    -- Yes -- The device lead impedance measurements are within the normal range per  guidelines    -- Yes -- If the patient is pacemaker dependent the thresholds are less than or equal to 2.0V @ 0.4ms.    -- Yes -- RA and RV leads are programmed to bipolar pacing operation or pacing off. If no, CONTACT THE DEVICE REP FOR PROGRAMMING     Date of last Remote Device check: 2/10/25  Results of last Device check:  1.   Right atrium impedance: 418 Ohms  2.   Right ventricle impedance: 399 Ohms  3.   Left ventricle impedance: 988 Ohms     4.   Right atrium threshold: 0.5 V @ 0.4 ms  5.   Right ventricle threshold: NA, RV programmed subthreshold to obtain CSP pacing  6.   Left ventricle threshold: 1.6 V @ 0.6 ms     Device programming during the scan guidelines   Pacing Mode (check one): Use the recommended pacing mode per Medtronic MRI Access Application    If remote reprogramming is applicable, please contact the Rep to assist         Kailey Vázquez RN

## 2025-04-17 ENCOUNTER — THERAPY VISIT (OUTPATIENT)
Dept: PHYSICAL THERAPY | Facility: REHABILITATION | Age: 70
End: 2025-04-17
Payer: MEDICARE

## 2025-04-17 DIAGNOSIS — M48.062 SPINAL STENOSIS OF LUMBAR REGION WITH NEUROGENIC CLAUDICATION: Primary | ICD-10-CM

## 2025-04-18 ENCOUNTER — HOSPITAL ENCOUNTER (OUTPATIENT)
Dept: GENERAL RADIOLOGY | Facility: HOSPITAL | Age: 70
Discharge: HOME | End: 2025-04-18
Attending: PHYSICIAN ASSISTANT | Admitting: PHYSICIAN ASSISTANT
Payer: MEDICARE

## 2025-04-18 DIAGNOSIS — M48.062 SPINAL STENOSIS OF LUMBAR REGION WITH NEUROGENIC CLAUDICATION: ICD-10-CM

## 2025-04-18 PROCEDURE — 72110 X-RAY EXAM L-2 SPINE 4/>VWS: CPT

## 2025-04-23 ENCOUNTER — TELEPHONE (OUTPATIENT)
Dept: CARDIOLOGY | Facility: CLINIC | Age: 70
End: 2025-04-23
Payer: MEDICARE

## 2025-04-23 NOTE — TELEPHONE ENCOUNTER
Received call from MRI stating pt scheduled for Friday 4/25/25 and in need of MRI clearance as clearance was given for [place].     This device does not meet the FDA criteria for MRI approval at this facility. Patient has the option of further review at the [facility] where they have a process and staffing in place to perform off-label scans on non conditional devices.  Pt has LV lead with RV lead set subthreshold. Per [employer] and . RV lead is used for pacing during scan and can NOT be set subthreshold.     Returned call to MRI to inform not compatible at this location. SHERRY Farzier

## 2025-04-23 NOTE — TELEPHONE ENCOUNTER
"CANCELED PATIENTS EXISTING APPOINTMENT - HE WOULD LIKE TO GO TO [facility]:    \"This device does not meet the FDA criteria for MRI approval at this facility. Patient has the option of further review at the [facility] where they have a process and staffing in place to perform off-label scans on non conditional devices. Pt has LV lead with RV lead set subthreshold. Per [employer] and . RV lead is used for pacing during scan and can NOT be set subthreshold.\"      Reason for call: MR Device Safety Clearance      Please create a MR Device Safety order  Patient is reporting patient has Pacemaker  Type of MR exam: MR Lumbar Spine w/o Contrast     Do you get your Device checked at [facility]?: Yes - [facility]   "

## 2025-05-27 ENCOUNTER — TELEPHONE (OUTPATIENT)
Dept: NEUROSURGERY | Facility: CLINIC | Age: 70
End: 2025-05-27
Payer: MEDICARE

## 2025-05-27 NOTE — TELEPHONE ENCOUNTER
[facility] Call Center    Phone Message    May a detailed message be left on voicemail: yes     Reason for Call: Other: Patient calling in stating he is concerned he has been waiting almost three months to figure out what his next steps should be. Writer did inform the patient there are imaging orders in the system for him but he would like to speak to someone about the questions he has and to figure out what is going to be done next.      Action Taken: [facility]     Travel Screening: Not Applicable     Date of Service:

## 2025-05-27 NOTE — TELEPHONE ENCOUNTER
Left detailed message for Jhonny stating he should call our scheduling team at [phone] to set up a follow up appt in clinic with AYANNA to review imaging and discuss next step.  May Rowell RN, CNRN

## 2025-06-02 ENCOUNTER — ANCILLARY PROCEDURE (OUTPATIENT)
Dept: CARDIOLOGY | Facility: CLINIC | Age: 70
End: 2025-06-02
Attending: INTERNAL MEDICINE
Payer: MEDICARE

## 2025-06-02 ENCOUNTER — HOSPITAL ENCOUNTER (OUTPATIENT)
Dept: MRI IMAGING | Facility: CLINIC | Age: 70
Discharge: HOME | End: 2025-06-02
Attending: PHYSICIAN ASSISTANT
Payer: MEDICARE

## 2025-06-02 ENCOUNTER — HOSPITAL ENCOUNTER (OUTPATIENT)
Dept: GENERAL RADIOLOGY | Facility: CLINIC | Age: 70
Discharge: HOME | End: 2025-06-02
Attending: PHYSICIAN ASSISTANT
Payer: MEDICARE

## 2025-06-02 DIAGNOSIS — Z45.02 FITTING AND ADJUSTMENT OF AUTOMATIC IMPLANTABLE CARDIOVERTER-DEFIBRILLATOR: ICD-10-CM

## 2025-06-02 DIAGNOSIS — M48.062 SPINAL STENOSIS OF LUMBAR REGION WITH NEUROGENIC CLAUDICATION: ICD-10-CM

## 2025-06-02 DIAGNOSIS — Z45.02 FITTING AND ADJUSTMENT OF AUTOMATIC IMPLANTABLE CARDIOVERTER-DEFIBRILLATOR: Primary | ICD-10-CM

## 2025-06-02 PROCEDURE — 72148 MRI LUMBAR SPINE W/O DYE: CPT | Mod: 26 | Performed by: RADIOLOGY

## 2025-06-02 PROCEDURE — 72148 MRI LUMBAR SPINE W/O DYE: CPT

## 2025-06-02 PROCEDURE — 71046 X-RAY EXAM CHEST 2 VIEWS: CPT

## 2025-06-02 PROCEDURE — 93287 PERI-PX DEVICE EVAL & PRGR: CPT | Mod: 26 | Performed by: INTERNAL MEDICINE

## 2025-06-02 PROCEDURE — 93287 PERI-PX DEVICE EVAL & PRGR: CPT

## 2025-06-02 PROCEDURE — 71046 X-RAY EXAM CHEST 2 VIEWS: CPT | Mod: 26 | Performed by: RADIOLOGY

## 2025-06-03 ENCOUNTER — TELEPHONE (OUTPATIENT)
Dept: NEUROSURGERY | Facility: CLINIC | Age: 70
End: 2025-06-03
Payer: MEDICARE

## 2025-06-03 NOTE — TELEPHONE ENCOUNTER
Attempted to call patient to review Mri results   LVM to return call to clinic when able in the upcoming days    Khloe Angel PA-C  [facility] Neurosurgery  [address] [place] [id number]  Tel [phone]  Fax [phone]  Text page via [facility] Paging/Directory

## 2025-06-03 NOTE — TELEPHONE ENCOUNTER
[facility] Call Center    Phone Message    May a detailed message be left on voicemail: yes     Reason for Call: Other: Patient called back writer was unable top get a hold of someone. Please review and call back.     Action Taken: Message routed to:  [facility]     Travel Screening: Not Applicable     Date of Service:

## 2025-06-04 ENCOUNTER — TELEPHONE (OUTPATIENT)
Dept: NEUROSURGERY | Facility: CLINIC | Age: 70
End: 2025-06-04
Payer: MEDICARE

## 2025-06-04 DIAGNOSIS — M48.062 SPINAL STENOSIS OF LUMBAR REGION WITH NEUROGENIC CLAUDICATION: Primary | ICD-10-CM

## 2025-06-04 LAB
MDC_IDC_EPISODE_DTM: NORMAL
MDC_IDC_EPISODE_DURATION: 25 S
MDC_IDC_EPISODE_DURATION: 28 S
MDC_IDC_EPISODE_DURATION: 350 S
MDC_IDC_EPISODE_DURATION: 4 S
MDC_IDC_EPISODE_DURATION: 5 S
MDC_IDC_EPISODE_DURATION: 68 S
MDC_IDC_EPISODE_DURATION: 7 S
MDC_IDC_EPISODE_DURATION: 7 S
MDC_IDC_EPISODE_DURATION: 82 S
MDC_IDC_EPISODE_ID: 32
MDC_IDC_EPISODE_ID: 33
MDC_IDC_EPISODE_ID: 656
MDC_IDC_EPISODE_ID: 657
MDC_IDC_EPISODE_ID: 658
MDC_IDC_EPISODE_ID: 659
MDC_IDC_EPISODE_ID: 660
MDC_IDC_EPISODE_ID: 661
MDC_IDC_EPISODE_ID: 662
MDC_IDC_EPISODE_TYPE: NORMAL
MDC_IDC_EPISODE_TYPE_INDUCED: NO
MDC_IDC_EPISODE_TYPE_INDUCED: NO
MDC_IDC_LEAD_CONNECTION_STATUS: NORMAL
MDC_IDC_LEAD_IMPLANT_DT: NORMAL
MDC_IDC_LEAD_LOCATION: NORMAL
MDC_IDC_LEAD_LOCATION_DETAIL_1: NORMAL
MDC_IDC_LEAD_MFG: NORMAL
MDC_IDC_LEAD_MODEL: NORMAL
MDC_IDC_LEAD_POLARITY_TYPE: NORMAL
MDC_IDC_LEAD_SERIAL: NORMAL
MDC_IDC_MSMT_BATTERY_DTM: NORMAL
MDC_IDC_MSMT_BATTERY_DTM: NORMAL
MDC_IDC_MSMT_BATTERY_REMAINING_LONGEVITY: 19 MO
MDC_IDC_MSMT_BATTERY_REMAINING_LONGEVITY: 19 MO
MDC_IDC_MSMT_BATTERY_RRT_TRIGGER: 2.73
MDC_IDC_MSMT_BATTERY_RRT_TRIGGER: 2.73
MDC_IDC_MSMT_BATTERY_STATUS: NORMAL
MDC_IDC_MSMT_BATTERY_STATUS: NORMAL
MDC_IDC_MSMT_BATTERY_VOLTAGE: 2.86 V
MDC_IDC_MSMT_BATTERY_VOLTAGE: 2.86 V
MDC_IDC_MSMT_LEADCHNL_LV_IMPEDANCE_VALUE: 246.63
MDC_IDC_MSMT_LEADCHNL_LV_IMPEDANCE_VALUE: 246.63
MDC_IDC_MSMT_LEADCHNL_LV_IMPEDANCE_VALUE: 250.94
MDC_IDC_MSMT_LEADCHNL_LV_IMPEDANCE_VALUE: 250.94
MDC_IDC_MSMT_LEADCHNL_LV_IMPEDANCE_VALUE: 255.09
MDC_IDC_MSMT_LEADCHNL_LV_IMPEDANCE_VALUE: 255.09
MDC_IDC_MSMT_LEADCHNL_LV_IMPEDANCE_VALUE: 261.16
MDC_IDC_MSMT_LEADCHNL_LV_IMPEDANCE_VALUE: 261.16
MDC_IDC_MSMT_LEADCHNL_LV_IMPEDANCE_VALUE: 270.67
MDC_IDC_MSMT_LEADCHNL_LV_IMPEDANCE_VALUE: 270.67
MDC_IDC_MSMT_LEADCHNL_LV_IMPEDANCE_VALUE: 475 OHM
MDC_IDC_MSMT_LEADCHNL_LV_IMPEDANCE_VALUE: 475 OHM
MDC_IDC_MSMT_LEADCHNL_LV_IMPEDANCE_VALUE: 513 OHM
MDC_IDC_MSMT_LEADCHNL_LV_IMPEDANCE_VALUE: 513 OHM
MDC_IDC_MSMT_LEADCHNL_LV_IMPEDANCE_VALUE: 532 OHM
MDC_IDC_MSMT_LEADCHNL_LV_IMPEDANCE_VALUE: 532 OHM
MDC_IDC_MSMT_LEADCHNL_LV_IMPEDANCE_VALUE: 551 OHM
MDC_IDC_MSMT_LEADCHNL_LV_IMPEDANCE_VALUE: 551 OHM
MDC_IDC_MSMT_LEADCHNL_LV_IMPEDANCE_VALUE: 722 OHM
MDC_IDC_MSMT_LEADCHNL_LV_IMPEDANCE_VALUE: 722 OHM
MDC_IDC_MSMT_LEADCHNL_LV_IMPEDANCE_VALUE: 893 OHM
MDC_IDC_MSMT_LEADCHNL_LV_IMPEDANCE_VALUE: 931 OHM
MDC_IDC_MSMT_LEADCHNL_LV_IMPEDANCE_VALUE: 988 OHM
MDC_IDC_MSMT_LEADCHNL_LV_IMPEDANCE_VALUE: 988 OHM
MDC_IDC_MSMT_LEADCHNL_LV_PACING_THRESHOLD_AMPLITUDE: 1.5 V
MDC_IDC_MSMT_LEADCHNL_LV_PACING_THRESHOLD_AMPLITUDE: 1.5 V
MDC_IDC_MSMT_LEADCHNL_LV_PACING_THRESHOLD_PULSEWIDTH: 0.6 MS
MDC_IDC_MSMT_LEADCHNL_LV_PACING_THRESHOLD_PULSEWIDTH: 0.6 MS
MDC_IDC_MSMT_LEADCHNL_RA_IMPEDANCE_VALUE: 399 OHM
MDC_IDC_MSMT_LEADCHNL_RA_IMPEDANCE_VALUE: 418 OHM
MDC_IDC_MSMT_LEADCHNL_RA_PACING_THRESHOLD_AMPLITUDE: 0.5 V
MDC_IDC_MSMT_LEADCHNL_RA_PACING_THRESHOLD_AMPLITUDE: 0.5 V
MDC_IDC_MSMT_LEADCHNL_RA_PACING_THRESHOLD_PULSEWIDTH: 0.4 MS
MDC_IDC_MSMT_LEADCHNL_RA_PACING_THRESHOLD_PULSEWIDTH: 0.4 MS
MDC_IDC_MSMT_LEADCHNL_RA_SENSING_INTR_AMPL: 4.25 MV
MDC_IDC_MSMT_LEADCHNL_RA_SENSING_INTR_AMPL: 4.25 MV
MDC_IDC_MSMT_LEADCHNL_RA_SENSING_INTR_AMPL: 5 MV
MDC_IDC_MSMT_LEADCHNL_RA_SENSING_INTR_AMPL: 5.12 MV
MDC_IDC_MSMT_LEADCHNL_RV_IMPEDANCE_VALUE: 285 OHM
MDC_IDC_MSMT_LEADCHNL_RV_IMPEDANCE_VALUE: 304 OHM
MDC_IDC_MSMT_LEADCHNL_RV_IMPEDANCE_VALUE: 361 OHM
MDC_IDC_MSMT_LEADCHNL_RV_IMPEDANCE_VALUE: 361 OHM
MDC_IDC_MSMT_LEADCHNL_RV_PACING_THRESHOLD_AMPLITUDE: 1.25 V
MDC_IDC_MSMT_LEADCHNL_RV_PACING_THRESHOLD_AMPLITUDE: 1.5 V
MDC_IDC_MSMT_LEADCHNL_RV_PACING_THRESHOLD_PULSEWIDTH: 0.4 MS
MDC_IDC_MSMT_LEADCHNL_RV_PACING_THRESHOLD_PULSEWIDTH: 0.4 MS
MDC_IDC_MSMT_LEADCHNL_RV_SENSING_INTR_AMPL: 9.38 MV
MDC_IDC_MSMT_LEADCHNL_RV_SENSING_INTR_AMPL: 9.88 MV
MDC_IDC_PG_IMPLANT_DTM: NORMAL
MDC_IDC_PG_IMPLANT_DTM: NORMAL
MDC_IDC_PG_MFG: NORMAL
MDC_IDC_PG_MFG: NORMAL
MDC_IDC_PG_MODEL: NORMAL
MDC_IDC_PG_MODEL: NORMAL
MDC_IDC_PG_SERIAL: NORMAL
MDC_IDC_PG_SERIAL: NORMAL
MDC_IDC_PG_TYPE: NORMAL
MDC_IDC_PG_TYPE: NORMAL
MDC_IDC_SESS_CLINIC_NAME: NORMAL
MDC_IDC_SESS_CLINIC_NAME: NORMAL
MDC_IDC_SESS_DTM: NORMAL
MDC_IDC_SESS_DTM: NORMAL
MDC_IDC_SESS_TYPE: NORMAL
MDC_IDC_SESS_TYPE: NORMAL
MDC_IDC_SET_BRADY_AT_MODE_SWITCH_RATE: 176 {BEATS}/MIN
MDC_IDC_SET_BRADY_AT_MODE_SWITCH_RATE: 176 {BEATS}/MIN
MDC_IDC_SET_BRADY_LOWRATE: 50 {BEATS}/MIN
MDC_IDC_SET_BRADY_LOWRATE: 50 {BEATS}/MIN
MDC_IDC_SET_BRADY_MAX_SENSOR_RATE: 130 {BEATS}/MIN
MDC_IDC_SET_BRADY_MAX_SENSOR_RATE: 130 {BEATS}/MIN
MDC_IDC_SET_BRADY_MAX_TRACKING_RATE: 140 {BEATS}/MIN
MDC_IDC_SET_BRADY_MAX_TRACKING_RATE: 140 {BEATS}/MIN
MDC_IDC_SET_BRADY_MODE: NORMAL
MDC_IDC_SET_BRADY_MODE: NORMAL
MDC_IDC_SET_BRADY_PAV_DELAY_HIGH: 100 MS
MDC_IDC_SET_BRADY_PAV_DELAY_HIGH: 100 MS
MDC_IDC_SET_BRADY_PAV_DELAY_LOW: 160 MS
MDC_IDC_SET_BRADY_PAV_DELAY_LOW: 160 MS
MDC_IDC_SET_BRADY_SAV_DELAY_HIGH: 70 MS
MDC_IDC_SET_BRADY_SAV_DELAY_HIGH: 70 MS
MDC_IDC_SET_BRADY_SAV_DELAY_LOW: 100 MS
MDC_IDC_SET_BRADY_SAV_DELAY_LOW: 100 MS
MDC_IDC_SET_CRT_LVRV_DELAY: 0 MS
MDC_IDC_SET_CRT_LVRV_DELAY: 0 MS
MDC_IDC_SET_CRT_PACED_CHAMBERS: NORMAL
MDC_IDC_SET_CRT_PACED_CHAMBERS: NORMAL
MDC_IDC_SET_LEADCHNL_LV_PACING_AMPLITUDE: 2.75 V
MDC_IDC_SET_LEADCHNL_LV_PACING_AMPLITUDE: 2.75 V
MDC_IDC_SET_LEADCHNL_LV_PACING_ANODE_ELECTRODE_1: NORMAL
MDC_IDC_SET_LEADCHNL_LV_PACING_ANODE_ELECTRODE_1: NORMAL
MDC_IDC_SET_LEADCHNL_LV_PACING_ANODE_LOCATION_1: NORMAL
MDC_IDC_SET_LEADCHNL_LV_PACING_ANODE_LOCATION_1: NORMAL
MDC_IDC_SET_LEADCHNL_LV_PACING_CAPTURE_MODE: NORMAL
MDC_IDC_SET_LEADCHNL_LV_PACING_CAPTURE_MODE: NORMAL
MDC_IDC_SET_LEADCHNL_LV_PACING_CATHODE_ELECTRODE_1: NORMAL
MDC_IDC_SET_LEADCHNL_LV_PACING_CATHODE_ELECTRODE_1: NORMAL
MDC_IDC_SET_LEADCHNL_LV_PACING_CATHODE_LOCATION_1: NORMAL
MDC_IDC_SET_LEADCHNL_LV_PACING_CATHODE_LOCATION_1: NORMAL
MDC_IDC_SET_LEADCHNL_LV_PACING_POLARITY: NORMAL
MDC_IDC_SET_LEADCHNL_LV_PACING_POLARITY: NORMAL
MDC_IDC_SET_LEADCHNL_LV_PACING_PULSEWIDTH: 0.6 MS
MDC_IDC_SET_LEADCHNL_LV_PACING_PULSEWIDTH: 0.6 MS
MDC_IDC_SET_LEADCHNL_RA_PACING_AMPLITUDE: 1.5 V
MDC_IDC_SET_LEADCHNL_RA_PACING_AMPLITUDE: 1.5 V
MDC_IDC_SET_LEADCHNL_RA_PACING_ANODE_ELECTRODE_1: NORMAL
MDC_IDC_SET_LEADCHNL_RA_PACING_ANODE_ELECTRODE_1: NORMAL
MDC_IDC_SET_LEADCHNL_RA_PACING_ANODE_LOCATION_1: NORMAL
MDC_IDC_SET_LEADCHNL_RA_PACING_ANODE_LOCATION_1: NORMAL
MDC_IDC_SET_LEADCHNL_RA_PACING_CAPTURE_MODE: NORMAL
MDC_IDC_SET_LEADCHNL_RA_PACING_CAPTURE_MODE: NORMAL
MDC_IDC_SET_LEADCHNL_RA_PACING_CATHODE_ELECTRODE_1: NORMAL
MDC_IDC_SET_LEADCHNL_RA_PACING_CATHODE_ELECTRODE_1: NORMAL
MDC_IDC_SET_LEADCHNL_RA_PACING_CATHODE_LOCATION_1: NORMAL
MDC_IDC_SET_LEADCHNL_RA_PACING_CATHODE_LOCATION_1: NORMAL
MDC_IDC_SET_LEADCHNL_RA_PACING_POLARITY: NORMAL
MDC_IDC_SET_LEADCHNL_RA_PACING_POLARITY: NORMAL
MDC_IDC_SET_LEADCHNL_RA_PACING_PULSEWIDTH: 0.4 MS
MDC_IDC_SET_LEADCHNL_RA_PACING_PULSEWIDTH: 0.4 MS
MDC_IDC_SET_LEADCHNL_RA_SENSING_ANODE_ELECTRODE_1: NORMAL
MDC_IDC_SET_LEADCHNL_RA_SENSING_ANODE_ELECTRODE_1: NORMAL
MDC_IDC_SET_LEADCHNL_RA_SENSING_ANODE_LOCATION_1: NORMAL
MDC_IDC_SET_LEADCHNL_RA_SENSING_ANODE_LOCATION_1: NORMAL
MDC_IDC_SET_LEADCHNL_RA_SENSING_CATHODE_ELECTRODE_1: NORMAL
MDC_IDC_SET_LEADCHNL_RA_SENSING_CATHODE_ELECTRODE_1: NORMAL
MDC_IDC_SET_LEADCHNL_RA_SENSING_CATHODE_LOCATION_1: NORMAL
MDC_IDC_SET_LEADCHNL_RA_SENSING_CATHODE_LOCATION_1: NORMAL
MDC_IDC_SET_LEADCHNL_RA_SENSING_POLARITY: NORMAL
MDC_IDC_SET_LEADCHNL_RA_SENSING_POLARITY: NORMAL
MDC_IDC_SET_LEADCHNL_RA_SENSING_SENSITIVITY: 0.3 MV
MDC_IDC_SET_LEADCHNL_RA_SENSING_SENSITIVITY: 0.3 MV
MDC_IDC_SET_LEADCHNL_RV_PACING_AMPLITUDE: 0.5 V
MDC_IDC_SET_LEADCHNL_RV_PACING_AMPLITUDE: 0.5 V
MDC_IDC_SET_LEADCHNL_RV_PACING_ANODE_ELECTRODE_1: NORMAL
MDC_IDC_SET_LEADCHNL_RV_PACING_ANODE_ELECTRODE_1: NORMAL
MDC_IDC_SET_LEADCHNL_RV_PACING_ANODE_LOCATION_1: NORMAL
MDC_IDC_SET_LEADCHNL_RV_PACING_ANODE_LOCATION_1: NORMAL
MDC_IDC_SET_LEADCHNL_RV_PACING_CAPTURE_MODE: NORMAL
MDC_IDC_SET_LEADCHNL_RV_PACING_CAPTURE_MODE: NORMAL
MDC_IDC_SET_LEADCHNL_RV_PACING_CATHODE_ELECTRODE_1: NORMAL
MDC_IDC_SET_LEADCHNL_RV_PACING_CATHODE_ELECTRODE_1: NORMAL
MDC_IDC_SET_LEADCHNL_RV_PACING_CATHODE_LOCATION_1: NORMAL
MDC_IDC_SET_LEADCHNL_RV_PACING_CATHODE_LOCATION_1: NORMAL
MDC_IDC_SET_LEADCHNL_RV_PACING_POLARITY: NORMAL
MDC_IDC_SET_LEADCHNL_RV_PACING_POLARITY: NORMAL
MDC_IDC_SET_LEADCHNL_RV_PACING_PULSEWIDTH: 0.03 MS
MDC_IDC_SET_LEADCHNL_RV_PACING_PULSEWIDTH: 0.03 MS
MDC_IDC_SET_LEADCHNL_RV_SENSING_ANODE_ELECTRODE_1: NORMAL
MDC_IDC_SET_LEADCHNL_RV_SENSING_ANODE_ELECTRODE_1: NORMAL
MDC_IDC_SET_LEADCHNL_RV_SENSING_ANODE_LOCATION_1: NORMAL
MDC_IDC_SET_LEADCHNL_RV_SENSING_ANODE_LOCATION_1: NORMAL
MDC_IDC_SET_LEADCHNL_RV_SENSING_CATHODE_ELECTRODE_1: NORMAL
MDC_IDC_SET_LEADCHNL_RV_SENSING_CATHODE_ELECTRODE_1: NORMAL
MDC_IDC_SET_LEADCHNL_RV_SENSING_CATHODE_LOCATION_1: NORMAL
MDC_IDC_SET_LEADCHNL_RV_SENSING_CATHODE_LOCATION_1: NORMAL
MDC_IDC_SET_LEADCHNL_RV_SENSING_POLARITY: NORMAL
MDC_IDC_SET_LEADCHNL_RV_SENSING_POLARITY: NORMAL
MDC_IDC_SET_LEADCHNL_RV_SENSING_SENSITIVITY: 0.45 MV
MDC_IDC_SET_LEADCHNL_RV_SENSING_SENSITIVITY: 0.45 MV
MDC_IDC_SET_ZONE_DETECTION_BEATS_DENOMINATOR: 16 {BEATS}
MDC_IDC_SET_ZONE_DETECTION_BEATS_DENOMINATOR: 16 {BEATS}
MDC_IDC_SET_ZONE_DETECTION_BEATS_DENOMINATOR: 36 {BEATS}
MDC_IDC_SET_ZONE_DETECTION_BEATS_DENOMINATOR: 36 {BEATS}
MDC_IDC_SET_ZONE_DETECTION_BEATS_DENOMINATOR: 40 {BEATS}
MDC_IDC_SET_ZONE_DETECTION_BEATS_DENOMINATOR: 40 {BEATS}
MDC_IDC_SET_ZONE_DETECTION_BEATS_NUMERATOR: 16 {BEATS}
MDC_IDC_SET_ZONE_DETECTION_BEATS_NUMERATOR: 16 {BEATS}
MDC_IDC_SET_ZONE_DETECTION_BEATS_NUMERATOR: 30 {BEATS}
MDC_IDC_SET_ZONE_DETECTION_BEATS_NUMERATOR: 30 {BEATS}
MDC_IDC_SET_ZONE_DETECTION_BEATS_NUMERATOR: 36 {BEATS}
MDC_IDC_SET_ZONE_DETECTION_BEATS_NUMERATOR: 36 {BEATS}
MDC_IDC_SET_ZONE_DETECTION_INTERVAL: 200 MS
MDC_IDC_SET_ZONE_DETECTION_INTERVAL: 200 MS
MDC_IDC_SET_ZONE_DETECTION_INTERVAL: 240 MS
MDC_IDC_SET_ZONE_DETECTION_INTERVAL: 240 MS
MDC_IDC_SET_ZONE_DETECTION_INTERVAL: 320 MS
MDC_IDC_SET_ZONE_DETECTION_INTERVAL: 320 MS
MDC_IDC_SET_ZONE_DETECTION_INTERVAL: 340 MS
MDC_IDC_SET_ZONE_DETECTION_INTERVAL: 340 MS
MDC_IDC_SET_ZONE_DETECTION_INTERVAL: 350 MS
MDC_IDC_SET_ZONE_DETECTION_INTERVAL: 350 MS
MDC_IDC_SET_ZONE_DETECTION_INTERVAL: 360 MS
MDC_IDC_SET_ZONE_DETECTION_INTERVAL: 360 MS
MDC_IDC_SET_ZONE_STATUS: NORMAL
MDC_IDC_SET_ZONE_TYPE: NORMAL
MDC_IDC_SET_ZONE_VENDOR_TYPE: NORMAL
MDC_IDC_STAT_AT_BURDEN_PERCENT: 0 %
MDC_IDC_STAT_AT_BURDEN_PERCENT: 0 %
MDC_IDC_STAT_AT_DTM_END: NORMAL
MDC_IDC_STAT_AT_DTM_END: NORMAL
MDC_IDC_STAT_AT_DTM_START: NORMAL
MDC_IDC_STAT_AT_DTM_START: NORMAL
MDC_IDC_STAT_BRADY_AP_VP_PERCENT: 0.6 %
MDC_IDC_STAT_BRADY_AP_VP_PERCENT: 0.6 %
MDC_IDC_STAT_BRADY_AP_VS_PERCENT: 0.02 %
MDC_IDC_STAT_BRADY_AP_VS_PERCENT: 0.02 %
MDC_IDC_STAT_BRADY_AS_VP_PERCENT: 99.25 %
MDC_IDC_STAT_BRADY_AS_VP_PERCENT: 99.25 %
MDC_IDC_STAT_BRADY_AS_VS_PERCENT: 0.13 %
MDC_IDC_STAT_BRADY_AS_VS_PERCENT: 0.13 %
MDC_IDC_STAT_BRADY_DTM_END: NORMAL
MDC_IDC_STAT_BRADY_DTM_END: NORMAL
MDC_IDC_STAT_BRADY_DTM_START: NORMAL
MDC_IDC_STAT_BRADY_DTM_START: NORMAL
MDC_IDC_STAT_BRADY_RA_PERCENT_PACED: 0.62 %
MDC_IDC_STAT_BRADY_RA_PERCENT_PACED: 0.62 %
MDC_IDC_STAT_CRT_DTM_END: NORMAL
MDC_IDC_STAT_CRT_DTM_END: NORMAL
MDC_IDC_STAT_CRT_DTM_START: NORMAL
MDC_IDC_STAT_CRT_DTM_START: NORMAL
MDC_IDC_STAT_CRT_LV_PERCENT_PACED: 99.79 %
MDC_IDC_STAT_CRT_LV_PERCENT_PACED: 99.79 %
MDC_IDC_STAT_CRT_PERCENT_PACED: 99.79 %
MDC_IDC_STAT_CRT_PERCENT_PACED: 99.79 %
MDC_IDC_STAT_EPISODE_RECENT_COUNT: 0
MDC_IDC_STAT_EPISODE_RECENT_COUNT_DTM_END: NORMAL
MDC_IDC_STAT_EPISODE_RECENT_COUNT_DTM_START: NORMAL
MDC_IDC_STAT_EPISODE_TOTAL_COUNT: 0
MDC_IDC_STAT_EPISODE_TOTAL_COUNT: 2
MDC_IDC_STAT_EPISODE_TOTAL_COUNT: 2
MDC_IDC_STAT_EPISODE_TOTAL_COUNT_DTM_END: NORMAL
MDC_IDC_STAT_EPISODE_TOTAL_COUNT_DTM_START: NORMAL
MDC_IDC_STAT_EPISODE_TYPE: NORMAL
MDC_IDC_STAT_TACHYTHERAPY_ATP_DELIVERED_RECENT: 0
MDC_IDC_STAT_TACHYTHERAPY_ATP_DELIVERED_RECENT: 0
MDC_IDC_STAT_TACHYTHERAPY_ATP_DELIVERED_TOTAL: 0
MDC_IDC_STAT_TACHYTHERAPY_ATP_DELIVERED_TOTAL: 0
MDC_IDC_STAT_TACHYTHERAPY_RECENT_DTM_END: NORMAL
MDC_IDC_STAT_TACHYTHERAPY_RECENT_DTM_END: NORMAL
MDC_IDC_STAT_TACHYTHERAPY_RECENT_DTM_START: NORMAL
MDC_IDC_STAT_TACHYTHERAPY_RECENT_DTM_START: NORMAL
MDC_IDC_STAT_TACHYTHERAPY_SHOCKS_ABORTED_RECENT: 0
MDC_IDC_STAT_TACHYTHERAPY_SHOCKS_ABORTED_RECENT: 0
MDC_IDC_STAT_TACHYTHERAPY_SHOCKS_ABORTED_TOTAL: 0
MDC_IDC_STAT_TACHYTHERAPY_SHOCKS_ABORTED_TOTAL: 0
MDC_IDC_STAT_TACHYTHERAPY_SHOCKS_DELIVERED_RECENT: 0
MDC_IDC_STAT_TACHYTHERAPY_SHOCKS_DELIVERED_RECENT: 0
MDC_IDC_STAT_TACHYTHERAPY_SHOCKS_DELIVERED_TOTAL: 0
MDC_IDC_STAT_TACHYTHERAPY_SHOCKS_DELIVERED_TOTAL: 0
MDC_IDC_STAT_TACHYTHERAPY_TOTAL_DTM_END: NORMAL
MDC_IDC_STAT_TACHYTHERAPY_TOTAL_DTM_END: NORMAL
MDC_IDC_STAT_TACHYTHERAPY_TOTAL_DTM_START: NORMAL
MDC_IDC_STAT_TACHYTHERAPY_TOTAL_DTM_START: NORMAL

## 2025-06-04 NOTE — TELEPHONE ENCOUNTER
Reviewed MRI with pateint   Recommend KALYAN, follow up in clinic 2-3 weeks after KALYAN with Dr. Diego for potential surgical discussion if ongoing/persistent sx    Patient in agreement  Referral sent to Dr. Sadie Angel PA-C  [facility] Neurosurgery  [address] [place] [id number]  Tel [phone]  Fax [phone]  Text page via [facility] Paging/Directory

## 2025-06-04 NOTE — TELEPHONE ENCOUNTER
[facility] Call Center    Phone Message    May a detailed message be left on voicemail: yes     Reason for Call: Other: Patient is calling requesting to speak with Khloe about his MRI results. Please call back.      Action Taken: Message routed to:  Other: [facility] Neurosurgery    Travel Screening: Not Applicable

## 2025-06-05 ENCOUNTER — PATIENT OUTREACH (OUTPATIENT)
Dept: CARE COORDINATION | Facility: CLINIC | Age: 70
End: 2025-06-05
Payer: MEDICARE

## 2025-06-09 ENCOUNTER — PATIENT OUTREACH (OUTPATIENT)
Dept: CARE COORDINATION | Facility: CLINIC | Age: 70
End: 2025-06-09
Payer: MEDICARE

## 2025-06-10 ENCOUNTER — TELEPHONE (OUTPATIENT)
Dept: NEUROSURGERY | Facility: CLINIC | Age: 70
End: 2025-06-10
Payer: MEDICARE

## 2025-06-10 DIAGNOSIS — M48.062 SPINAL STENOSIS OF LUMBAR REGION WITH NEUROGENIC CLAUDICATION: Primary | ICD-10-CM

## 2025-06-10 NOTE — TELEPHONE ENCOUNTER
Reviewed imaging with patient   Reviewed conservative measures include injection and physical therapy which has proven to be very beneficial    We will arrange follow up with neurosurgeon 3 weeks after injections. If still ongoing pain and discomfort, will discuss surgical option at that time    Patient prefers [place] location  Referral placed for Dr. Stiles per patient and updated their team     Pt in agreement  Will cancel appt on 6/18 with Angelique Angel PA-C  [facility] Neurosurgery  [address] [place] [id number]  Tel [phone]  Fax [phone]  Text page via [facility] Paging/Directory

## 2025-06-10 NOTE — TELEPHONE ENCOUNTER
Patient is in the process of scheduling and KALYAN injection. Patient will need to have a follow up appointment with Dr. Monahan- 2-3 weeks post injection.    Patient was given the clinic phone number of [phone] for the purpose of scheduling with Dr. Monahan.

## 2025-06-11 ENCOUNTER — PATIENT OUTREACH (OUTPATIENT)
Dept: CARE COORDINATION | Facility: CLINIC | Age: 70
End: 2025-06-11
Payer: MEDICARE

## 2025-06-18 DIAGNOSIS — M48.062 SPINAL STENOSIS OF LUMBAR REGION WITH NEUROGENIC CLAUDICATION: Primary | ICD-10-CM

## 2025-06-19 ENCOUNTER — PATIENT OUTREACH (OUTPATIENT)
Dept: CARE COORDINATION | Facility: CLINIC | Age: 70
End: 2025-06-19
Payer: MEDICARE

## 2025-06-23 ENCOUNTER — TELEPHONE (OUTPATIENT)
Dept: PHYSICAL MEDICINE AND REHAB | Facility: CLINIC | Age: 70
End: 2025-06-23
Payer: MEDICARE

## 2025-06-23 ENCOUNTER — PATIENT OUTREACH (OUTPATIENT)
Dept: CARE COORDINATION | Facility: CLINIC | Age: 70
End: 2025-06-23
Payer: MEDICARE

## 2025-06-23 DIAGNOSIS — M54.16 LUMBAR RADICULOPATHY: Primary | ICD-10-CM

## 2025-06-23 DIAGNOSIS — N52.9 ERECTILE DYSFUNCTION, UNSPECIFIED ERECTILE DYSFUNCTION TYPE: ICD-10-CM

## 2025-06-23 RX ORDER — SILDENAFIL CITRATE 20 MG/1
TABLET ORAL
Qty: 30 TABLET | Refills: 0 | Status: SHIPPED | OUTPATIENT
Start: 2025-06-23

## 2025-06-23 NOTE — TELEPHONE ENCOUNTER
Called and spoke with pt. Pt reports he takes ASA on his own without a managing provider.   Instructed pt to hold ASA from 7/23-7/29 and resume taking 24 hours post procedure. He stated understanding.     Order placed and linked to appt.

## 2025-06-23 NOTE — TELEPHONE ENCOUNTER
"Screening Questions for Radiology Injections:    Injection to be done at which interventional clinic site? [facility]    If choosing [facility] for location, please inform patient:  \"[facility] is a Hospital based clinic. Before your visit, you should check with your insurance about how it covers the charges for facility services in a hospital-based clinic.     Procedure ordered by Khloe Dior PA-C in  NEUROSURGERY    Procedure ordered? L3-4 Interlaminar KALYAN or as indicated  Transforaminal Cervical KALYAN - Send to [facility] ([facility]) - No [facility] Site providers perform this procedure    What insurance would patient like us to bill for this procedure? Medicare  IF SCHEDULING IN [place] PAIN OR SPINE PLEASE SCHEDULE AT LEAST 7-10 BUSINESS DAYS OUT SO A PA CAN BE OBTAINED  Worker's comp or MVA (motor vehicle accident) -Any injection DO NOT SCHEDULE and route to Erick Felipe.    [employer] insurance - For ALL INJECTIONS DO NOT SCHEDULE and route to Allie Hardwick.     ALL BCBS, [employer] and [employer] - DO NOT SCHEDULE and route to Allie Hardwick  MEDICA- ALL INJECTIONS- route to Allie Ronen    Is patient scheduled at [facility]? Yes   If YES, route every encounter to [facility] SPINE C[facility] CARE NAVIGATION POOL [[phone]]    Is an  needed? No     Patient has a  home? (Review Grid) YES: ok    Any chance of pregnancy? Not Applicable   If YES, do NOT schedule and route to RN pool  - Dr. Cabrera route to PM&R Nurse  [[id number]]      Is patient actively being treated for cancer or immunocompromised? No  If YES, do NOT schedule and route to RN pool/ Dr. Cabrera's Team    Does the patient have a bleeding or clotting disorder? No   If YES, okay to schedule AND route to RN nurse / Dr. Cabrera's Team   (For any patients with platelet count <100, RN must forward to provider)    Is patient taking any Blood Thinners OR Antiplatelet medication?  No   If hold needed, do NOT schedule, route to " SHERRY arcos/ Dr. Cabrera's Team  Examples:   Blood Thinners: (Coumadin, Warfarin, Jantoven, Pradaxa, Xarelto, Eliquis, Edoxaban, Enoxaparin, Lovenox, Heparin, Arixtra, Fondaparinux or Fragmin)  Antiplatelet Medications: (Plavix, Brilinta or Effient)     Is patient taking any aspirin products (includes: Aspirin 81 mg, Excedrin, and Fiorinal)? Yes.   Route to nursing prior to scheduling if hold required per grid. 81 MG  If yes route to RN pool/ Dr. Cabrera's Team - Do not schedule    Is patient taking any GLP-1 Antagonist (hold needed for sedation patients only) No   (semaglutide (Ozempic, Wegovy), dulaglutide (Trulicity), exenatide ER (Bydureon), tirzepatide (Mounjaro), Liraglutide (Saxenda, Victoza), semaglutide (Rybelsus), Terzepatide (Zepbound)  If YES, okay to schedule AND route to RN nurse / Dr. Cabrera's Team      Any allergies to contrast dye, iodine, shellfish, or numbing and steroid medications? No  If YES, schedule and add allergy information to appointment notes AND route to the RN pool/ Dr. Cabrera's Team  If KALYAN and Contrast Dye / Iodine Allergy? DO NOT SCHEDULE, route to RN pool/ Dr. Cabrera's Team  Allergies: Simvastatin and Spironolactone     Does patient have an active infection or treated for one within the past week? No  Is patient currently taking any antibiotics or steroid medications?  No   For patients on chronic, preventative, or prophylactic antibiotics, procedures may be scheduled.   For patients on antibiotics for active or recent infection, schedule 4 days after completed.  For patients on steroid medications, schedule 4 days after completed.     Has the patient had a flu shot or any other vaccinations within the past 7 days? No  If yes, explain that for the vaccine to work best they need to:     wait 1 week before and 1 week after getting any Vaccine  wait 1 week before and 2 weeks after getting any Covid Vaccine   If patient has concerns about the timing, send to RN pool/ Dr. Cabrera's  Team    Does patient have an MRI/CT?  YES: 6/2/25  Include Date and Check Procedure Scheduling Grid to see if required.  Was the MRI/CT done within the last 3 years?  Yes   If no route to RN Pool/ Dr. Vuongs Team  If yes, where was the MRI/CT done? [facility]   Refer to PACS Transmissions list for approved external locations and route to RN Pool High Priority/ Dr. Vuongs Team  If MRI was not done at approved external location do NOT schedule and route to RN pool/ Dr. Zuñiga Team    If patient has an imaging disc, the injection MAY be scheduled but patient must bring disc to appt or appt will be cancelled.    Is patient able to transfer to a procedure table with minimal or no assistance? Yes   If no, do NOT schedule and route to RN Pool/ Dr. Cabrera's Team    Procedure Specific Instructions:  If celiac plexus block, informed patient NPO for 6 hours and that it is okay to take medications with sips of water, especially blood pressure medications Not Applicable       If this is for a cervical procedure, informed patient that aspirin needs to be held for 6 days.   Not Applicable    Sedation, If Sedation is ordered for any procedure, patient must be NPO for 6 hours prior to procedure Not Applicable    If IV needed:  Do not schedule procedures requiring IV placement in the first appointment of the day or first appointment after lunch. Do NOT schedule at 0745, 0815 or 1245.     Instructed patient to arrive 30 minutes early for IV start if required. (Check Procedure Scheduling Grid)  Not Applicable    Reminders:  If you are started on any steroids or antibiotics between now and your appointment, you must contact us because the procedure may need to be cancelled.  Yes    As a reminder, receiving steroids can decrease your body's ability to fight infection.   Would you still like to move forward with scheduling the injection?  Yes    IV Sedation is not provided for procedures. If oral anti-anxiety medication is needed, the  patient should request this from their referring provider.    Instruct patient to arrive as directed prior to the scheduled appointment time:  If IV needed 30 minutes before appointment time     For patients 85 or older we recommend having an adult stay w/ them for the remainder of the day.     If the patient is Diabetic, remind them to bring their glucometer.    Dr. Jordan Pt's - Imaging Orders Needed   Please send all injections to RN Pool Not Applicable   Red Flags? Not Applicable    Does the patient have any questions?  NO  Marissa Ricketts  [facility]

## 2025-06-23 NOTE — PROGRESS NOTES
ASSESSMENT: Cj Randhawa is a 69 year old male with past medical history significant for hypertension, ICD in place, nonischemic cardiomyopathy, SVT, hyperlipidemia, obesity, former smoker, alpha thalassemia trait, history of nephrolithiasis, elevated PSA, history of malignant melanoma, gout who presents today for new patient evaluation of chronic low back pain with radiation into the left lower extremities associated numbness, tingling, weakness.  My review of an MRI lumbar spine shows multilevel lumbar spondylosis.  There is severe spinal stenosis at L3-4 and L4-5.  Symptoms are persistent despite conservative treatments including physical therapy, chiropractic treatment, and medical management with NSAIDs/oral steroids.    PLAN:  A shared decision making model was used.  The patient's values and choices were respected.  The following represents what was discussed and decided upon by the physician assistant and the patient.      1.  DIAGNOSTIC TESTS:  - Reviewed the MRI lumbar spine.  No additional diagnostic test were ordered    2.  PHYSICAL THERAPY: Patient attended 5 sessions of physical therapy February through April 2025.  Some exercises are helpful and he continues doing those but some make his pain worse.    3.  MEDICATIONS:  - I prescribed gabapentin 300 mg titrating up to 300 mg 3 times daily.  - Patient is currently taking Aleve 2 tabs daily which is helpful but his cardiologist has advised him to minimize NSAID use.    4.  INTERVENTIONS: Patient is scheduled for an L3-4 interlaminar epidural steroid injection July 29, 2025.  - If this does not help we could try left L3-4 and L4-5 transforaminal epidural steroid injections.    5.  PATIENT EDUCATION: Patient is in agreement the above plan.  All questions were answered.    6.  FOLLOW-UP:   Patient is scheduled to follow-up with neurosurgery 2 weeks after his L3-4 interlaminar epidural steroid injection.  I am happy to see him back if  needed.      SUBJECTIVE:  Cj Randhawa  Is a 69 year old male who presents today in consultation at the request of Khloe Dior PA-C for new patient evaluation of low back pain with radiation into the left lower extremity associated numbness, tingling, weakness.  Patient reports that he has had chronic low back pain off-and-on in the past which was always manageable.  About 2 years ago he began to experience numbness in the left thigh with walking which would resolve upon sitting.  Then in January 2025 the numbness in the left leg progressively worse and it was accompanied by stabbing pain in the leg.  He went through physical therapy.  Some of the exercises help and he continues to do those but as exercises got more intense the pain got worse and worse.  He was seen by neurosurgery.  They have recommended an L3-4 interlaminar epidural steroid injection which is scheduled for next month.  They also referred him to our clinic for further discussion of non surgical treatments.    Patient complains of left-sided low back pain.  Pain radiates into the left buttock and lateral hip.  Pain extends down the left anterolateral thigh to the knee.  When symptoms were at its worst they extended into the left anterior shin.  Since he increased his Aleve dose from 1 pill/day to 2 pills/day his symptoms improved and he no longer experiences any pain distal to the knee.  He has numbness and tingling in the same distribution as his pain.  He also has numbness and tingling on the bottoms of both feet.  He feels weak in the left leg.  He notices this most with stairs.  He states he would not be able to run.  He denies any right leg pain.  Denies loss of bowel or bladder control.  Denies recent fevers or chills.  Pain is aggravated with walking more than 15 minutes, bending, rolling over in bed.  Pain is alleviated with repositioning and sitting    Treatment to date  - Physical therapy for low back pain February through April 2025  still do some exercises  - Chiropractic treatment weekly somewhat helpful  - No spine injections  - No spine surgeries  - Aleve 2 pills daily helpful  - Tylenol not helpful  - Medrol somewhat helpful  - Ibuprofen less effective than Aleve    Current Outpatient Medications   Medication Sig Dispense Refill    allopurinol (ZYLOPRIM) 300 MG tablet TAKE 1 TABLET BY MOUTH EVERY DAY 90 tablet 3    lisinopril (ZESTRIL) 20 MG tablet Take 20 mg by mouth daily      methylPREDNISolone (MEDROL DOSEPAK) 4 MG tablet therapy pack Follow Package Directions 21 tablet 0    metoprolol succinate ER (TOPROL XL) 50 MG 24 hr tablet Take 1 tablet by mouth daily      sildenafil (REVATIO) 20 MG tablet TAKE 2 TO 4 TABLETS BY MOUTH AS NEEDED FOR ERECTILE DYSFUNCTION 30 tablet 0    Vitamin D, Cholecalciferol, 10 MCG (400 UNIT) TABS        No current facility-administered medications for this visit.       Allergies   Allergen Reactions    Simvastatin Unknown    Spironolactone Other (See Comments)       Past Medical History:   Diagnosis Date    Alpha thalassemia trait     Created by [facility] Annotation: May  7 2008 11:11AM - Destiny Pat: alpha   thalassemia   trait       Coronary artery disease     hilton score 148  mostly lad    Family history of myocardial infarction     pgf    Gout     High cholesterol     dislipidemia    Hyperlipidemia     Hypertension     Morbid obesity (H) 11/10/2017    Nephrolithiasis     Spinal stenosis in cervical region     C5 - C6 WITH LEFT C6 IMPINGEMENT       Thalassemia         Patient Active Problem List   Diagnosis    Spinal stenosis in cervical region    Mixed hyperlipidemia    Nephrolithiasis    Alpha thalassemia trait    Gout    Primary hypertension    Tubular adenoma of colon - last colonoscopy 2019    Morbid obesity (H)    Biventricular ICD (implantable cardioverter-defibrillator) in place    Non-ischemic cardiomyopathy (H)    History of malignant melanoma    Spinal stenosis of lumbar region with  neurogenic claudication    Former smoker    Elevated prostate specific antigen (PSA)    ED (erectile dysfunction)    SVT (supraventricular tachycardia)    Malignant melanoma of left lower leg (H)       Past Surgical History:   Procedure Laterality Date    COLONOSCOPY N/A 2/26/2019    Procedure: COLONOSCOPY with polypectomy;  Surgeon: Lara Harman MD;  Location: [facility];  Service: Gastroenterology    COLONOSCOPY N/A 5/1/2024    Procedure: Colonoscopy;  Surgeon: Chelsey Rees MD;  Location: [facility]    INGUINAL HERNIA REPAIR      REVISE SECONDARY VARICOSITY      Description: Varicose Vein Ligation;  Recorded: 05/19/2009;       Family History   Problem Relation Age of Onset    Kidney failure Mother     Diabetes Type 2  Mother     Lung Cancer Father     No Known Problems Sister     No Known Problems Sister     Gout Other     No Known Problems Daughter     No Known Problems Daughter     No Known Problems Son        Social history: Patient is .  He is a construction owner.  He denies tobacco use.  Admits to alcohol use.  Admits to marijuana use      ROS: Positive for sexual dysfunction, ringing in ears, changes in vision, wheezing, joint pain, muscle fatigue, sciatica, itching, easy bruising, excessive tiredness, anxiety.  Specifically negative for bowel/bladder dysfunction, fevers,chills, appetite changes, unexplained weight loss.   Otherwise 13 systems reviewed are negative.  Please see the patient's intake questionnaire from today for details.      OBJECTIVE:  PHYSICAL EXAMINATION:    CONSTITUTIONAL:  Vital signs as above.  No acute distress.  The patient is well nourished and well groomed.  PSYCHIATRIC:  The patient is awake, alert, oriented to person, place, time and answering questions appropriately with clear speech.    HEENT: Normocephalic, atraumatic.  Sclera clear.  Neck is supple.  SKIN:  Skin over the face, bilateral lower extremities, and posterior torso is clean, dry, intact  without rashes.    GAIT:  Gait is non-antalgic.  Patient ambulates with a flexed forward posture at the hips.  The patient is able to heel and toe walk without significant difficulty.    STANDING EXAMINATION: Tender to palpation left lower lumbar paraspinous muscles.  Lumbar flexion is full.  Lumbar extension severely restricted with reproduction of back pain.  MUSCLE STRENGTH:  The patient has 4/5 strength left hip flexors, otherwise 5/5 strength for the right hip flexors, bilateral knee flexors/extensors, ankle dorsiflexors/plantar flexors, great toe extensors.  NEUROLOGICAL: Reflexes are 2+ right and 1+ left patellar, absent bilateral Achilles.  Negative Babinski's bilaterally.  No ankle clonus bilaterally.  Subjective diminished sensation to light touch bilateral plantar feet.  VASCULAR:  2/4 posterior tibialis pulses bilaterally.  Bilateral lower extremities are warm.  There is no pitting edema of the bilateral lower extremities.  ABDOMINAL:  Soft, non-distended, non-tender throughout all quadrants.  No pulsatile mass palpated in the left lower quadrant.  LYMPH NODES:  No palpable or tender inguinal lymph nodes.  MUSCULOSKELETAL: Straight leg raise is negative bilaterally    RESULTS: I reviewed the MRI lumbar spine dated June 7, 2025.  This shows multilevel disc degeneration and facet arthropathy.  There is grade 1 anterolisthesis L4-5.  At L1-L2 there is mild spinal canal stenosis and mild bilateral foraminal stenosis.  At L2-3 there is mild spinal canal stenosis and mild lateral foraminal stenosis.  At L3-4 there is severe spinal stenosis with moderate to severe bilateral foraminal stenosis.  At L4-5 there is severe spinal stenosis with moderate bilateral foraminal stenosis.  At L5-S1 there is moderate bilateral foraminal stenosis

## 2025-06-26 ENCOUNTER — OFFICE VISIT (OUTPATIENT)
Dept: PHYSICAL MEDICINE AND REHAB | Facility: CLINIC | Age: 70
End: 2025-06-26
Attending: PHYSICIAN ASSISTANT
Payer: MEDICARE

## 2025-06-26 VITALS
HEIGHT: 70 IN | HEART RATE: 86 BPM | DIASTOLIC BLOOD PRESSURE: 63 MMHG | BODY MASS INDEX: 35.06 KG/M2 | WEIGHT: 244.9 LBS | TEMPERATURE: 98.9 F | SYSTOLIC BLOOD PRESSURE: 129 MMHG

## 2025-06-26 DIAGNOSIS — M48.062 SPINAL STENOSIS OF LUMBAR REGION WITH NEUROGENIC CLAUDICATION: ICD-10-CM

## 2025-06-26 RX ORDER — GABAPENTIN 300 MG/1
CAPSULE ORAL
Qty: 90 CAPSULE | Refills: 1 | Status: SHIPPED | OUTPATIENT
Start: 2025-06-26 | End: 2025-09-24

## 2025-06-26 ASSESSMENT — PAIN SCALES - GENERAL: PAINLEVEL_OUTOF10: MODERATE PAIN (4)

## 2025-06-26 NOTE — PATIENT INSTRUCTIONS
Prescribed Gabapentin today, 300 mg tablets, to be titrated up to 1 tablets 3 times a day as tolerated for your nerve pain. Please follow Gabapentin dosing chart below.    Gabapentin 300mg Dosing Chart    DATE  MORNING AFTERNOON BEDTIME    Day 1 0 0 1    Day 2 0 0 1    Day 3 0 0 1    Day 4 1 0 1    Day 5 1 0 1    Day 6 1 0 1    Day 7 1 1 1    Day 8 1 1 1    Day 9 1 1 1                                                                                                                                   Continue medication, taking 1 capsules three times daily  Please call if you have any questions regarding how to take your medication

## 2025-06-26 NOTE — LETTER
6/26/2025      Cj Randhawa  [address] [place] [address]      Dear Colleague,    Thank you for referring your patient, Cj Randhawa, to the [facility]. Please see a copy of my visit note below.    ASSESSMENT: Cj Randhawa is a 69 year old male with past medical history significant for hypertension, ICD in place, nonischemic cardiomyopathy, SVT, hyperlipidemia, obesity, former smoker, alpha thalassemia trait, history of nephrolithiasis, elevated PSA, history of malignant melanoma, gout who presents today for new patient evaluation of chronic low back pain with radiation into the left lower extremities associated numbness, tingling, weakness.  My review of an MRI lumbar spine shows multilevel lumbar spondylosis.  There is severe spinal stenosis at L3-4 and L4-5.  Symptoms are persistent despite conservative treatments including physical therapy, chiropractic treatment, and medical management with NSAIDs/oral steroids.    PLAN:  A shared decision making model was used.  The patient's values and choices were respected.  The following represents what was discussed and decided upon by the physician assistant and the patient.      1.  DIAGNOSTIC TESTS:  - Reviewed the MRI lumbar spine.  No additional diagnostic test were ordered    2.  PHYSICAL THERAPY: Patient attended 5 sessions of physical therapy February through April 2025.  Some exercises are helpful and he continues doing those but some make his pain worse.    3.  MEDICATIONS:  - I prescribed gabapentin 300 mg titrating up to 300 mg 3 times daily.  - Patient is currently taking Aleve 2 tabs daily which is helpful but his cardiologist has advised him to minimize NSAID use.    4.  INTERVENTIONS: Patient is scheduled for an L3-4 interlaminar epidural steroid injection July 29, 2025.  - If this does not help we could try left L3-4 and L4-5 transforaminal epidural steroid injections.    5.  PATIENT EDUCATION: Patient is in  agreement the above plan.  All questions were answered.    6.  FOLLOW-UP:   Patient is scheduled to follow-up with neurosurgery 2 weeks after his L3-4 interlaminar epidural steroid injection.  I am happy to see him back if needed.      SUBJECTIVE:  Cj Randhawa  Is a 69 year old male who presents today in consultation at the request of Khloe Dior PA-C for new patient evaluation of low back pain with radiation into the left lower extremity associated numbness, tingling, weakness.  Patient reports that he has had chronic low back pain off-and-on in the past which was always manageable.  About 2 years ago he began to experience numbness in the left thigh with walking which would resolve upon sitting.  Then in January 2025 the numbness in the left leg progressively worse and it was accompanied by stabbing pain in the leg.  He went through physical therapy.  Some of the exercises help and he continues to do those but as exercises got more intense the pain got worse and worse.  He was seen by neurosurgery.  They have recommended an L3-4 interlaminar epidural steroid injection which is scheduled for next month.  They also referred him to our clinic for further discussion of non surgical treatments.    Patient complains of left-sided low back pain.  Pain radiates into the left buttock and lateral hip.  Pain extends down the left anterolateral thigh to the knee.  When symptoms were at its worst they extended into the left anterior shin.  Since he increased his Aleve dose from 1 pill/day to 2 pills/day his symptoms improved and he no longer experiences any pain distal to the knee.  He has numbness and tingling in the same distribution as his pain.  He also has numbness and tingling on the bottoms of both feet.  He feels weak in the left leg.  He notices this most with stairs.  He states he would not be able to run.  He denies any right leg pain.  Denies loss of bowel or bladder control.  Denies recent fevers or chills.   Pain is aggravated with walking more than 15 minutes, bending, rolling over in bed.  Pain is alleviated with repositioning and sitting    Treatment to date  - Physical therapy for low back pain February through April 2025 still do some exercises  - Chiropractic treatment weekly somewhat helpful  - No spine injections  - No spine surgeries  - Aleve 2 pills daily helpful  - Tylenol not helpful  - Medrol somewhat helpful  - Ibuprofen less effective than Aleve    Current Outpatient Medications   Medication Sig Dispense Refill     allopurinol (ZYLOPRIM) 300 MG tablet TAKE 1 TABLET BY MOUTH EVERY DAY 90 tablet 3     lisinopril (ZESTRIL) 20 MG tablet Take 20 mg by mouth daily       methylPREDNISolone (MEDROL DOSEPAK) 4 MG tablet therapy pack Follow Package Directions 21 tablet 0     metoprolol succinate ER (TOPROL XL) 50 MG 24 hr tablet Take 1 tablet by mouth daily       sildenafil (REVATIO) 20 MG tablet TAKE 2 TO 4 TABLETS BY MOUTH AS NEEDED FOR ERECTILE DYSFUNCTION 30 tablet 0     Vitamin D, Cholecalciferol, 10 MCG (400 UNIT) TABS        No current facility-administered medications for this visit.       Allergies   Allergen Reactions     Simvastatin Unknown     Spironolactone Other (See Comments)       Past Medical History:   Diagnosis Date     Alpha thalassemia trait     Created by [employer] [facility] Annotation: May  7 2008 11:11AM - Destiny Pat: alpha   thalassemia   trait        Coronary artery disease     hilton score 148  mostly lad     Family history of myocardial infarction     pgf     Gout      High cholesterol     dislipidemia     Hyperlipidemia      Hypertension      Morbid obesity (H) 11/10/2017     Nephrolithiasis      Spinal stenosis in cervical region     C5 - C6 WITH LEFT C6 IMPINGEMENT        Thalassemia         Patient Active Problem List   Diagnosis     Spinal stenosis in cervical region     Mixed hyperlipidemia     Nephrolithiasis     Alpha thalassemia trait     Gout     Primary hypertension      Tubular adenoma of colon - last colonoscopy 2019     Morbid obesity (H)     Biventricular ICD (implantable cardioverter-defibrillator) in place     Non-ischemic cardiomyopathy (H)     History of malignant melanoma     Spinal stenosis of lumbar region with neurogenic claudication     Former smoker     Elevated prostate specific antigen (PSA)     ED (erectile dysfunction)     SVT (supraventricular tachycardia)     Malignant melanoma of left lower leg (H)       Past Surgical History:   Procedure Laterality Date     COLONOSCOPY N/A 2/26/2019    Procedure: COLONOSCOPY with polypectomy;  Surgeon: Lara Harman MD;  Location: [facility];  Service: Gastroenterology     COLONOSCOPY N/A 5/1/2024    Procedure: Colonoscopy;  Surgeon: Chelsey Rees MD;  Location: [facility]     INGUINAL HERNIA REPAIR       REVISE SECONDARY VARICOSITY      Description: Varicose Vein Ligation;  Recorded: 05/19/2009;       Family History   Problem Relation Age of Onset     Kidney failure Mother      Diabetes Type 2  Mother      Lung Cancer Father      No Known Problems Sister      No Known Problems Sister      Gout Other      No Known Problems Daughter      No Known Problems Daughter      No Known Problems Son        Social history: Patient is .  He is a construction owner.  He denies tobacco use.  Admits to alcohol use.  Admits to marijuana use      ROS: Positive for sexual dysfunction, ringing in ears, changes in vision, wheezing, joint pain, muscle fatigue, sciatica, itching, easy bruising, excessive tiredness, anxiety.  Specifically negative for bowel/bladder dysfunction, fevers,chills, appetite changes, unexplained weight loss.   Otherwise 13 systems reviewed are negative.  Please see the patient's intake questionnaire from today for details.      OBJECTIVE:  PHYSICAL EXAMINATION:    CONSTITUTIONAL:  Vital signs as above.  No acute distress.  The patient is well nourished and well groomed.  PSYCHIATRIC:  The patient is  awake, alert, oriented to person, place, time and answering questions appropriately with clear speech.    HEENT: Normocephalic, atraumatic.  Sclera clear.  Neck is supple.  SKIN:  Skin over the face, bilateral lower extremities, and posterior torso is clean, dry, intact without rashes.    GAIT:  Gait is non-antalgic.  Patient ambulates with a flexed forward posture at the hips.  The patient is able to heel and toe walk without significant difficulty.    STANDING EXAMINATION: Tender to palpation left lower lumbar paraspinous muscles.  Lumbar flexion is full.  Lumbar extension severely restricted with reproduction of back pain.  MUSCLE STRENGTH:  The patient has 4/5 strength left hip flexors, otherwise 5/5 strength for the right hip flexors, bilateral knee flexors/extensors, ankle dorsiflexors/plantar flexors, great toe extensors.  NEUROLOGICAL: Reflexes are 2+ right and 1+ left patellar, absent bilateral Achilles.  Negative Babinski's bilaterally.  No ankle clonus bilaterally.  Subjective diminished sensation to light touch bilateral plantar feet.  VASCULAR:  2/4 posterior tibialis pulses bilaterally.  Bilateral lower extremities are warm.  There is no pitting edema of the bilateral lower extremities.  ABDOMINAL:  Soft, non-distended, non-tender throughout all quadrants.  No pulsatile mass palpated in the left lower quadrant.  LYMPH NODES:  No palpable or tender inguinal lymph nodes.  MUSCULOSKELETAL: Straight leg raise is negative bilaterally    RESULTS: I reviewed the MRI lumbar spine dated June 7, 2025.  This shows multilevel disc degeneration and facet arthropathy.  There is grade 1 anterolisthesis L4-5.  At L1-L2 there is mild spinal canal stenosis and mild bilateral foraminal stenosis.  At L2-3 there is mild spinal canal stenosis and mild lateral foraminal stenosis.  At L3-4 there is severe spinal stenosis with moderate to severe bilateral foraminal stenosis.  At L4-5 there is severe spinal stenosis with  moderate bilateral foraminal stenosis.  At L5-S1 there is moderate bilateral foraminal stenosis      Again, thank you for allowing me to participate in the care of your patient.        Sincerely,        Glenny Mcgee PA-C    Electronically signed

## 2025-07-08 ENCOUNTER — TRANSFERRED RECORDS (OUTPATIENT)
Dept: HEALTH INFORMATION MANAGEMENT | Facility: CLINIC | Age: 70
End: 2025-07-08
Payer: MEDICARE

## 2025-07-15 ENCOUNTER — RADIOLOGY INJECTION OFFICE VISIT (OUTPATIENT)
Dept: PHYSICAL MEDICINE AND REHAB | Facility: CLINIC | Age: 70
End: 2025-07-15
Attending: PHYSICIAN ASSISTANT
Payer: MEDICARE

## 2025-07-15 VITALS
RESPIRATION RATE: 16 BRPM | TEMPERATURE: 98.8 F | HEART RATE: 58 BPM | OXYGEN SATURATION: 97 % | SYSTOLIC BLOOD PRESSURE: 138 MMHG | DIASTOLIC BLOOD PRESSURE: 76 MMHG

## 2025-07-15 DIAGNOSIS — M48.062 SPINAL STENOSIS OF LUMBAR REGION WITH NEUROGENIC CLAUDICATION: ICD-10-CM

## 2025-07-15 DIAGNOSIS — M54.16 LUMBAR RADICULOPATHY: ICD-10-CM

## 2025-07-15 RX ORDER — LIDOCAINE HYDROCHLORIDE 10 MG/ML
INJECTION, SOLUTION EPIDURAL; INFILTRATION; INTRACAUDAL; PERINEURAL
Status: COMPLETED | OUTPATIENT
Start: 2025-07-15 | End: 2025-07-15

## 2025-07-15 RX ORDER — DEXAMETHASONE SODIUM PHOSPHATE 10 MG/ML
INJECTION, SOLUTION INTRAMUSCULAR; INTRAVENOUS
Status: COMPLETED | OUTPATIENT
Start: 2025-07-15 | End: 2025-07-15

## 2025-07-15 RX ADMIN — DEXAMETHASONE SODIUM PHOSPHATE 10 MG: 10 INJECTION, SOLUTION INTRAMUSCULAR; INTRAVENOUS at 10:22

## 2025-07-15 RX ADMIN — LIDOCAINE HYDROCHLORIDE 2 ML: 10 INJECTION, SOLUTION EPIDURAL; INFILTRATION; INTRACAUDAL; PERINEURAL at 10:22

## 2025-07-15 ASSESSMENT — PAIN SCALES - GENERAL
PAINLEVEL_OUTOF10: NO PAIN (0)
PAINLEVEL_OUTOF10: MODERATE PAIN (4)

## 2025-07-15 NOTE — PATIENT INSTRUCTIONS
Follow-up visit with ESMER Fan in 2-4 weeks to discuss injection outcome and determine care plan going forward.    Learning About Lumbar Epidural Steroid Injections  What is a lumbar epidural steroid injection?  A lumbar epidural injection is a shot into the epidural space--the area in your back around the spinal cord. The shot may help reduce pain, tingling, or numbness in your back, buttock, or leg. The shot may have a steroid to reduce pain and swelling and a local anesthetic to numb nerves.  How is a lumbar epidural steroid injection done?  The doctor may use an imaging test before or during your injection. This can be an MRI, a CT scan, or an X-ray. These tests can show where your nerve problems are.  After finding the right spot, the doctor may inject a numbing medicine into the skin where you will get the steroid injection. Then the needle for the steroid is put into the numbed area. You may feel some pressure. You could feel some stinging or burning during the injection.  How long does an epidural steroid injection take?  The procedure will take 5 to 15 minutes. You will go home about an hour later.  What can you expect after a lumbar epidural steroid injection?  If your injection had local anesthetic and a steroid, your legs may feel heavy or numb right after. You will probably be able to walk. But you may need to be extra careful. Take care not to lose your balance, and be sure to follow your doctor's instructions.  If your injection contained local anesthetic, you may feel better right away. But this pain relief will last only a few hours. Your pain will probably return. This is because the steroids have not started working yet. Before the steroids start to work, your back may be sore for a few days.  These injections don't always work. When they do, it takes 1 to 5 days. This pain relief can last for several days to a few months or longer.  You may want to do less than normal for a few days. But  you may also be able to return to your daily routine.  Some people are dizzy or feel sick to their stomach after getting this injection. These symptoms usually don't last very long.  If your pain is better, you may be able to keep doing your normal activities or physical therapy. But try not to overdo it, even if your back pain has improved a lot. If your pain is only a little better or if it comes back, your doctor may recommend another injection in a few weeks. If your pain has not changed, talk to your doctor about other treatment choices.  Follow-up care is a key part of your treatment and safety. Be sure to make and go to all appointments, and call your doctor if you are having problems. It's also a good idea to know your test results and keep a list of the medicines you take.       DISCHARGE INSTRUCTIONS    During office hours (8:00 a.m.- 4:00 p.m.) questions or concerns may be answered  by calling [facility] Navigation Nurses at  [phone].  Messages received after hours will be returned the following business day.      In the case of an emergency, please dial 911 or seek assistance at the nearest Emergency Room/Urgent Care facility.     All Patients:    You may experience an increase in your symptoms for the first 2 days (It may take anywhere between 2 days - 2 weeks for the steroid to have maximum effect).    You may use ice on the injection site, as frequently as 20 minutes each hour if needed.    You may take your pain medicine.    You may continue taking your regular medication after your injection. If you have had a medial branch block you may resume pain medication once your pain diary is completed.    You may shower. No swimming, tub bath, or hot tub for 48 hours.  You may remove your bandaid/bandage as soon as you are home.    You may resume light activities, as tolerated.    Resume your usual diet as tolerated.    If you were told to hold any blood thinning medications you may resume taking  them 24 hours after your procedure as prescribed.    It is strongly advised that you do not drive for 1-3 hours post injection.    If you have had oral sedation:  Do not drive for 8 hours post injection.        POSSIBLE STEROID SIDE EFFECTS (If steroid/cortisone was used for your procedure    Swelling of the legs              Skin redness (flushing)     Mouth (oral) irritation   Increased blood sugar (glucose) levels            Sweats                    Mood changes  Headache  Sleeplessness  Weakened immune system for up to 14 days, which could increase the risk of debra the COVID-19 virus and/or experiencing more severe symptoms of the disease, if exposed.  Decreased effectiveness of vaccines if given within 2 weeks of the steroid.    -If you experience these symptoms, it should only last for a short period         POSSIBLE PROCEDURE SIDE EFFECTS    Increased Pain           Increased numbness/tingling      Nausea/Vomiting          Bruising/bleeding at site      Redness or swelling                                              Difficulty walking      Weakness           Fever greater than 100.5    -Call the Spine Center if you are concerned    *In the event of a severe headache after an epidural steroid injection that is relieved by lying down, please call the [facility] to speak with a clinical staff member*

## 2025-08-13 ENCOUNTER — OFFICE VISIT (OUTPATIENT)
Dept: NEUROSURGERY | Facility: CLINIC | Age: 70
End: 2025-08-13
Payer: MEDICARE

## 2025-08-13 VITALS
OXYGEN SATURATION: 95 % | HEIGHT: 70 IN | WEIGHT: 240 LBS | BODY MASS INDEX: 34.36 KG/M2 | DIASTOLIC BLOOD PRESSURE: 70 MMHG | SYSTOLIC BLOOD PRESSURE: 143 MMHG | HEART RATE: 78 BPM

## 2025-08-13 DIAGNOSIS — M48.062 SPINAL STENOSIS OF LUMBAR REGION WITH NEUROGENIC CLAUDICATION: Primary | ICD-10-CM

## 2025-08-13 PROCEDURE — 3077F SYST BP >= 140 MM HG: CPT | Performed by: STUDENT IN AN ORGANIZED HEALTH CARE EDUCATION/TRAINING PROGRAM

## 2025-08-13 PROCEDURE — 3078F DIAST BP <80 MM HG: CPT | Performed by: STUDENT IN AN ORGANIZED HEALTH CARE EDUCATION/TRAINING PROGRAM

## 2025-08-13 PROCEDURE — 99213 OFFICE O/P EST LOW 20 MIN: CPT | Performed by: STUDENT IN AN ORGANIZED HEALTH CARE EDUCATION/TRAINING PROGRAM

## 2025-08-13 PROCEDURE — 1126F AMNT PAIN NOTED NONE PRSNT: CPT | Performed by: STUDENT IN AN ORGANIZED HEALTH CARE EDUCATION/TRAINING PROGRAM

## 2025-08-13 ASSESSMENT — PAIN SCALES - GENERAL: PAINLEVEL_OUTOF10: NO PAIN (0)
